# Patient Record
Sex: FEMALE | Race: WHITE | Employment: FULL TIME | URBAN - METROPOLITAN AREA
[De-identification: names, ages, dates, MRNs, and addresses within clinical notes are randomized per-mention and may not be internally consistent; named-entity substitution may affect disease eponyms.]

---

## 2018-07-06 ENCOUNTER — HOSPITAL ENCOUNTER (OUTPATIENT)
Dept: CT IMAGING | Age: 58
Discharge: HOME | End: 2018-07-06
Attending: INTERNAL MEDICINE
Payer: COMMERCIAL

## 2018-07-06 DIAGNOSIS — K62.89 MASS IN RECTUM: ICD-10-CM

## 2018-07-06 PROCEDURE — 74011000255 HC RX REV CODE- 255: Performed by: INTERNAL MEDICINE

## 2018-07-06 PROCEDURE — 74177 CT ABD & PELVIS W/CONTRAST: CPT

## 2018-07-06 PROCEDURE — 71260 CT THORAX DX C+: CPT

## 2018-07-06 PROCEDURE — 74011636320 HC RX REV CODE- 636/320: Performed by: INTERNAL MEDICINE

## 2018-07-06 PROCEDURE — 74011250636 HC RX REV CODE- 250/636: Performed by: INTERNAL MEDICINE

## 2018-07-06 RX ORDER — BARIUM SULFATE 20 MG/ML
900 SUSPENSION ORAL
Status: COMPLETED | OUTPATIENT
Start: 2018-07-06 | End: 2018-07-06

## 2018-07-06 RX ORDER — SODIUM CHLORIDE 9 MG/ML
50 INJECTION, SOLUTION INTRAVENOUS
Status: COMPLETED | OUTPATIENT
Start: 2018-07-06 | End: 2018-07-06

## 2018-07-06 RX ORDER — SODIUM CHLORIDE 0.9 % (FLUSH) 0.9 %
10 SYRINGE (ML) INJECTION
Status: COMPLETED | OUTPATIENT
Start: 2018-07-06 | End: 2018-07-06

## 2018-07-06 RX ADMIN — Medication 10 ML: at 16:10

## 2018-07-06 RX ADMIN — IOPAMIDOL 100 ML: 755 INJECTION, SOLUTION INTRAVENOUS at 16:08

## 2018-07-06 RX ADMIN — BARIUM SULFATE 900 ML: 21 SUSPENSION ORAL at 16:07

## 2018-07-06 RX ADMIN — SODIUM CHLORIDE 50 ML/HR: 900 INJECTION, SOLUTION INTRAVENOUS at 16:10

## 2018-07-11 ENCOUNTER — HOSPITAL ENCOUNTER (OUTPATIENT)
Age: 58
Setting detail: OUTPATIENT SURGERY
Discharge: HOME | End: 2018-07-11
Attending: INTERNAL MEDICINE | Admitting: INTERNAL MEDICINE
Payer: COMMERCIAL

## 2018-07-11 ENCOUNTER — ANESTHESIA EVENT (OUTPATIENT)
Dept: ENDOSCOPY | Age: 58
End: 2018-07-11
Payer: COMMERCIAL

## 2018-07-11 ENCOUNTER — ANESTHESIA (OUTPATIENT)
Dept: ENDOSCOPY | Age: 58
End: 2018-07-11
Payer: COMMERCIAL

## 2018-07-11 VITALS
TEMPERATURE: 98.4 F | SYSTOLIC BLOOD PRESSURE: 115 MMHG | OXYGEN SATURATION: 99 % | BODY MASS INDEX: 32.49 KG/M2 | RESPIRATION RATE: 16 BRPM | HEART RATE: 84 BPM | DIASTOLIC BLOOD PRESSURE: 81 MMHG | WEIGHT: 207 LBS | HEIGHT: 67 IN

## 2018-07-11 PROCEDURE — 74011000250 HC RX REV CODE- 250

## 2018-07-11 PROCEDURE — 74011250636 HC RX REV CODE- 250/636

## 2018-07-11 PROCEDURE — 77030019957 HC CUF BLN GASTSCP OCOA -B: Performed by: INTERNAL MEDICINE

## 2018-07-11 PROCEDURE — 76040000019: Performed by: INTERNAL MEDICINE

## 2018-07-11 PROCEDURE — 76060000031 HC ANESTHESIA FIRST 0.5 HR: Performed by: INTERNAL MEDICINE

## 2018-07-11 RX ORDER — EPINEPHRINE 0.1 MG/ML
1 INJECTION INTRACARDIAC; INTRAVENOUS
Status: DISCONTINUED | OUTPATIENT
Start: 2018-07-11 | End: 2018-07-11 | Stop reason: HOSPADM

## 2018-07-11 RX ORDER — LIDOCAINE HYDROCHLORIDE 20 MG/ML
INJECTION, SOLUTION EPIDURAL; INFILTRATION; INTRACAUDAL; PERINEURAL AS NEEDED
Status: DISCONTINUED | OUTPATIENT
Start: 2018-07-11 | End: 2018-07-11 | Stop reason: HOSPADM

## 2018-07-11 RX ORDER — SODIUM CHLORIDE 9 MG/ML
INJECTION, SOLUTION INTRAVENOUS
Status: DISCONTINUED | OUTPATIENT
Start: 2018-07-11 | End: 2018-07-11 | Stop reason: HOSPADM

## 2018-07-11 RX ORDER — NALOXONE HYDROCHLORIDE 0.4 MG/ML
0.4 INJECTION, SOLUTION INTRAMUSCULAR; INTRAVENOUS; SUBCUTANEOUS
Status: DISCONTINUED | OUTPATIENT
Start: 2018-07-11 | End: 2018-07-11 | Stop reason: HOSPADM

## 2018-07-11 RX ORDER — ATROPINE SULFATE 0.1 MG/ML
0.5 INJECTION INTRAVENOUS
Status: DISCONTINUED | OUTPATIENT
Start: 2018-07-11 | End: 2018-07-11 | Stop reason: HOSPADM

## 2018-07-11 RX ORDER — SODIUM CHLORIDE 0.9 % (FLUSH) 0.9 %
5-10 SYRINGE (ML) INJECTION EVERY 8 HOURS
Status: DISCONTINUED | OUTPATIENT
Start: 2018-07-11 | End: 2018-07-11 | Stop reason: HOSPADM

## 2018-07-11 RX ORDER — FLUMAZENIL 0.1 MG/ML
0.2 INJECTION INTRAVENOUS
Status: DISCONTINUED | OUTPATIENT
Start: 2018-07-11 | End: 2018-07-11 | Stop reason: HOSPADM

## 2018-07-11 RX ORDER — SODIUM CHLORIDE 0.9 % (FLUSH) 0.9 %
5-10 SYRINGE (ML) INJECTION AS NEEDED
Status: DISCONTINUED | OUTPATIENT
Start: 2018-07-11 | End: 2018-07-11 | Stop reason: HOSPADM

## 2018-07-11 RX ORDER — DEXTROMETHORPHAN/PSEUDOEPHED 2.5-7.5/.8
1.2 DROPS ORAL
Status: DISCONTINUED | OUTPATIENT
Start: 2018-07-11 | End: 2018-07-11 | Stop reason: HOSPADM

## 2018-07-11 RX ORDER — MIDAZOLAM HYDROCHLORIDE 1 MG/ML
.25-1 INJECTION, SOLUTION INTRAMUSCULAR; INTRAVENOUS
Status: DISCONTINUED | OUTPATIENT
Start: 2018-07-11 | End: 2018-07-11 | Stop reason: HOSPADM

## 2018-07-11 RX ORDER — PROPOFOL 10 MG/ML
INJECTION, EMULSION INTRAVENOUS AS NEEDED
Status: DISCONTINUED | OUTPATIENT
Start: 2018-07-11 | End: 2018-07-11 | Stop reason: HOSPADM

## 2018-07-11 RX ORDER — SODIUM CHLORIDE 9 MG/ML
50 INJECTION, SOLUTION INTRAVENOUS CONTINUOUS
Status: DISCONTINUED | OUTPATIENT
Start: 2018-07-11 | End: 2018-07-11 | Stop reason: HOSPADM

## 2018-07-11 RX ORDER — FENTANYL CITRATE 50 UG/ML
100 INJECTION, SOLUTION INTRAMUSCULAR; INTRAVENOUS
Status: DISCONTINUED | OUTPATIENT
Start: 2018-07-11 | End: 2018-07-11 | Stop reason: HOSPADM

## 2018-07-11 RX ORDER — LORATADINE 10 MG/1
10 TABLET ORAL
COMMUNITY
End: 2019-09-03 | Stop reason: ALTCHOICE

## 2018-07-11 RX ORDER — METHIMAZOLE 5 MG/1
2.5 TABLET ORAL
COMMUNITY

## 2018-07-11 RX ADMIN — PROPOFOL 50 MG: 10 INJECTION, EMULSION INTRAVENOUS at 14:20

## 2018-07-11 RX ADMIN — PROPOFOL 50 MG: 10 INJECTION, EMULSION INTRAVENOUS at 14:14

## 2018-07-11 RX ADMIN — PROPOFOL 50 MG: 10 INJECTION, EMULSION INTRAVENOUS at 14:11

## 2018-07-11 RX ADMIN — SODIUM CHLORIDE: 9 INJECTION, SOLUTION INTRAVENOUS at 14:02

## 2018-07-11 RX ADMIN — PROPOFOL 50 MG: 10 INJECTION, EMULSION INTRAVENOUS at 14:17

## 2018-07-11 RX ADMIN — LIDOCAINE HYDROCHLORIDE 40 MG: 20 INJECTION, SOLUTION EPIDURAL; INFILTRATION; INTRACAUDAL; PERINEURAL at 14:11

## 2018-07-11 NOTE — PERIOP NOTES

## 2018-07-11 NOTE — ANESTHESIA PREPROCEDURE EVALUATION
Anesthetic History   No history of anesthetic complications            Review of Systems / Medical History  Patient summary reviewed, nursing notes reviewed and pertinent labs reviewed    Pulmonary  Within defined limits                 Neuro/Psych   Within defined limits           Cardiovascular                  Exercise tolerance: >4 METS     GI/Hepatic/Renal               Comments: Rectal cancer Endo/Other      Hypothyroidism       Other Findings              Physical Exam    Airway  Mallampati: I  TM Distance: > 6 cm  Neck ROM: normal range of motion   Mouth opening: Normal     Cardiovascular    Rhythm: regular  Rate: normal         Dental  No notable dental hx       Pulmonary  Breath sounds clear to auscultation               Abdominal         Other Findings            Anesthetic Plan    ASA: 2  Anesthesia type: MAC          Induction: Intravenous  Anesthetic plan and risks discussed with: Patient

## 2018-07-11 NOTE — IP AVS SNAPSHOT
Summary of Care Report The Summary of Care report has been created to help improve care coordination. Users with access to [employer] or [address] (Web-based application) may access additional patient information including the Discharge Summary. If you are not currently a [address] user and need more information, please call the number listed below in the [place] [address] section and ask to be connected with Medical Records. Facility Information Name Address Phone [address] [facility] 7 [phone] Patient Information Patient Name Sex JAGDEEP Carrillo ([id number]) Female 1960 Discharge Information Admitting Provider Service Area Unit Gaby Mg MD / [address] [place] [address] [phone] Discharge Provider Discharge Date/Time Discharge Disposition Destination (none) (none) (none) (none) Patient Language Language ENGLISH [13] Non-Hospital Problems as of 2018  Never Reviewed Class Noted - Resolved Last Modified Active Problems Mixed hyperlipidemia  3/24/2011 - Present 3/24/2011 by Gwendolyn Villela Entered by Gwendolyn Villela You are allergic to the following No active allergies Current Discharge Medication List  
  
CONTINUE these medications which have NOT CHANGED Dose & Instructions Dispensing Information Comments CLARITIN 10 mg tablet Generic drug:  loratadine Dose:  10 mg Take 10 mg by mouth. Refills:  0  
   
 methIMAzole 5 mg tablet Commonly known as:  TAPAZOLE Dose:  5 mg Take 5 mg by mouth. Refills:  0 Surgery Information ID Date/Time Status Primary Surgeon All Procedures Location  [id number] 2018 1330 Unposted Gaby Mg MD ENDOSCOPIC ULTRASOUND (EUS)/Rectal   [facility] [place] ENDOSCOPY Follow-up Information None Discharge Instructions [address]. 
217 [facility] [address] [place], [address] 
[phone] Yadi Rea 
[id number] 
1960 RECTAL EUS DISCHARGE INSTRUCTIONS DISCOMFORT: 
Redness at IV site- apply warm compress to area; if redness or soreness persist- contact your physician There may be a slight amount of blood passed from the rectum Gaseous discomfort- walking, belching will help relieve any discomfort You may not operate a vehicle for 12 hours You may not  engage in an occupation involving machinery or appliances for rest of today You may not  drink alcoholic beverages for at least 12 hours Avoid making any critical decisions for at least 24 hour DIET: 
 Regular diet.  however -  remember your colon is empty and a heavy meal will produce gas. Avoid these foods:  vegetables, fried / greasy foods, carbonated drinks for today ACTIVITY: It is recommended that you spend the remainder of the day resting -  avoid any strenuous activity. CALL M.D. ANY SIGN OF: Increasing pain, nausea, vomiting Abdominal distension (swelling) New increased bleeding (oral or rectal) Fever (chills) Pain in chest area Shortness of breath Post procedure diagnosis:  T2, N1 Rectal Mass Follow-up Instructions: 
 
Call Dr. Octavio Oakley for any questions or problems. I Telephone # [phone] Chart Review Routing History Recipient Method Report Sent By Gwyn Guthrie Ulysses Forester, MD  
Fax: [phone] Phone: [phone] Fax Pepe Sarabia MD [[id number]] 7/6/2018  6:30 PM 7/6/2018

## 2018-07-11 NOTE — H&P
[address]  [address], [address] [place] [address]                                History and Physical     NAME: Florin Arriaga   :  1960   MRN:  [id number]     HPI:  The patient was seen and examined. Past Surgical History:   Procedure Laterality Date    HX GI      cholecystectomy    HX HEENT      sinus sgy    HX ORTHOPAEDIC      carpel tunnel     Past Medical History:   Diagnosis Date    Thyroid disease     hyperthyroidism     Social History   Substance Use Topics    Smoking status: Never Smoker    Smokeless tobacco: Never Used    Alcohol use No     No Known Allergies  Family History   Problem Relation Age of Onset    Heart Disease Mother     Heart Disease Father      Current Facility-Administered Medications   Medication Dose Route Frequency    0.9% sodium chloride infusion  50 mL/hr IntraVENous CONTINUOUS    sodium chloride (NS) flush 5-10 mL  5-10 mL IntraVENous Q8H    sodium chloride (NS) flush 5-10 mL  5-10 mL IntraVENous PRN    midazolam (VERSED) injection 0.25-10 mg  0.25-10 mg IntraVENous Multiple    fentaNYL citrate (PF) injection 100 mcg  100 mcg IntraVENous MULTIPLE DOSE GIVEN    naloxone (NARCAN) injection 0.4 mg  0.4 mg IntraVENous Multiple    flumazenil (ROMAZICON) 0.1 mg/mL injection 0.2 mg  0.2 mg IntraVENous Multiple    simethicone (MYLICON) 53VH/5.1RA oral drops 80 mg  1.2 mL Oral Multiple    atropine injection 0.5 mg  0.5 mg IntraVENous ONCE PRN    EPINEPHrine (ADRENALIN) 0.1 mg/mL syringe 1 mg  1 mg Endoscopically ONCE PRN         PHYSICAL EXAM:  General: WD, WN. Alert, cooperative, no acute distress    HEENT: NC, Atraumatic. PERRLA, EOMI. Anicteric sclerae. Lungs:  CTA Bilaterally. No Wheezing/Rhonchi/Rales. Heart:  Regular  rhythm,  No murmur, No Rubs, No Gallops  Abdomen: Soft, Non distended, Non tender.  +Bowel sounds, no HSM  Extremities: No c/c/e  Neurologic:  CN 2-12 gi, Alert and oriented X 3.   No acute neurological distress   Psych:   Good insight. Not anxious nor agitated. The heart, lungs and mental status were satisfactory for the administration of MAC sedation and for the procedure.       Mallampati score: 2       Assessment:   · Rectal Cancer    Plan:   · Endoscopic procedure  · MAC sedation   ·

## 2018-07-11 NOTE — ROUTINE PROCESS
Zoe Traore  1960  [id number]    Situation:  Verbal report received from: Michelle Chung RN  Procedure: Procedure(s):  ENDOSCOPIC ULTRASOUND (EUS)/Rectal      Background:    Preoperative diagnosis: Rectal Cancer  Postoperative diagnosis: T3, N1  Rectal Mass    :  Dr. Aleisha Barros  Assistant(s): Endoscopy Technician-1: Derrick Graff  Endoscopy RN-1: Jada Avila RN    Specimens: * No specimens in log *  H. Pylori  no    Assessment:  Intra-procedure medications   Anesthesia gave intra-procedure sedation and medications, see anesthesia flow sheet yes    Intravenous fluids: NS@ KVO     Vital signs stable     Abdominal assessment: round and soft     Recommendation:  Discharge patient per MD order.     Family or Friend   Permission to share finding with family or friend yes

## 2018-07-11 NOTE — DISCHARGE INSTRUCTIONS
118 [facility]3].  217 [facility] [address], [address] [facility]                                  Dimitris Larsen  [id number]  1960    RECTAL EUS DISCHARGE INSTRUCTIONS    DISCOMFORT:  Redness at IV site- apply warm compress to area; if redness or soreness persist- contact your physician  There may be a slight amount of blood passed from the rectum  Gaseous discomfort- walking, belching will help relieve any discomfort  You may not operate a vehicle for 12 hours  You may not  engage in an occupation involving machinery or appliances for rest of today  You may not  drink alcoholic beverages for at least 12 hours  Avoid making any critical decisions for at least 24 hour    DIET:   Regular diet. - however -  remember your colon is empty and a heavy meal will produce gas. Avoid these foods:  vegetables, fried / greasy foods, carbonated drinks for today     ACTIVITY:  It is recommended that you spend the remainder of the day resting -  avoid any strenuous activity. CALL M.D. ANY SIGN OF:   Increasing pain, nausea, vomiting  Abdominal distension (swelling)  New increased bleeding (oral or rectal)  Fever (chills)  Pain in chest area    Shortness of breath      Post procedure diagnosis:  T2, N1  Rectal Mass    Follow-up Instructions:    Call Dr. Marlon Sherwood for any questions or problems.      I Telephone # [phone]

## 2018-07-11 NOTE — ANESTHESIA POSTPROCEDURE EVALUATION
Post-Anesthesia Evaluation and Assessment    Patient: Yadi Rogers MRN: [id number]  SSN: [id number]    YOB: 1960  Age: 63 y.o. Sex: female       Cardiovascular Function/Vital Signs  Visit Vitals    /81    Pulse 84    Temp 36.9 °C (98.4 °F)    Resp 16    Ht 5' 7\" (1.702 m)    Wt 93.9 kg (207 lb)    SpO2 99%    BMI 32.42 kg/m2       Patient is status post MAC anesthesia for Procedure(s):  ENDOSCOPIC ULTRASOUND (EUS)/Rectal  .    Nausea/Vomiting: None    Postoperative hydration reviewed and adequate. Pain:  Pain Scale 1: Numeric (0 - 10) (07/11/18 1442)  Pain Intensity 1: 0 (07/11/18 1442)   Managed    Neurological Status: At baseline    Mental Status and Level of Consciousness: Arousable    Pulmonary Status:   O2 Device: Room air (07/11/18 1442)   Adequate oxygenation and airway patent    Complications related to anesthesia: None    Post-anesthesia assessment completed.  No concerns    Signed By: Yolie Dominguez MD     July 11, 2018

## 2018-07-11 NOTE — H&P
118 [facility].  [address] [place] [address] [address], [address] [facility] [address]  [phone]                                 Flexible sigmoidoscopy with rectal endoscopic ultrasound     NAME:  Renuka Buchanan   :   1960   MRN:   [id number]       Date/Time:  2018     Procedure Name: Flexible sigmoidoscopy with rectal endoscopic ultrasound     Indications: Rectal Mass    : Claudia Paez MD    Referring Provider: Rigo Chávez MD    Anethesia/Sedation:  MAC anesthesia      Procedure Details   After infom consent was obtained for the procedure, with all risks and benefits of procedure explained the patient was taken to the endoscopy suite and placed in the left lateral decubitus position. Initially passed the rectal radial echoendoscope to 30 cm. The endoscope was withdrawn, and then the standard endoscope was passed. The patient tolerated the procedure well. Findings:     Endoscopic findings: A partially obstructing mass was seen at 4 cm from anal verge till 11 cm. Mass was friable and involved about 3/4th of the circumference. EUS findings:  A partially circumferential mass was seen in the distal and mid rectum. Mass was seen extending through the muscularis propria. No ilfiltration of the adjacent organs was noted. The iliac vessels were seen and were normal. Two small lymph nodes about 6 mm in size appreciated adjacent to the mass. No perirectal mass was seen. Specimen Removed:  None    Complications: None. EBL:  None.     Impression: T2N1    Recommendations:   -Refer to Jose Luis Castillo and [facility] appointment with Dr Miri Alcantar current medications    Claudia Paez MD  2018  2:25 PM

## 2018-07-11 NOTE — IP AVS SNAPSHOT
2700 [facility] [address] [place] [address] 
[phone] Patient: Slime Abel MRN: [id number]:[id number] About your hospitalization You were admitted on:  July 11, 2018 You last received care in the:  [facility] ENDOSCOPY You were discharged on:  July 11, 2018 Why you were hospitalized Your primary diagnosis was:  Not on File Follow-up Information None Your Scheduled Appointments Tuesday July 17, 2018  2:00 PM EDT New Patient with Tariq Thompson MD  
[address] [place] [address] [facility] [address] [facility] 209 [address] [place] [address]  
[phone] Discharge Orders None A check isidoro indicates which time of day the medication should be taken. My Medications CONTINUE taking these medications Instructions Each Dose to Equal  
 Morning Noon Evening Bedtime CLARITIN 10 mg tablet Generic drug:  loratadine Your last dose was: Your next dose is: Take 10 mg by mouth. 10 mg  
    
   
   
   
  
 methIMAzole 5 mg tablet Commonly known as:  TAPAZOLE Your last dose was: Your next dose is: Take 5 mg by mouth. 5 mg Discharge Instructions [facility]. 
[address] [facility] [address] [place], [address] 
[phone] Slime Abel 
[id number] 
1960 RECTAL EUS DISCHARGE INSTRUCTIONS DISCOMFORT: 
Redness at IV site- apply warm compress to area; if redness or soreness persist- contact your physician There may be a slight amount of blood passed from the rectum Gaseous discomfort- walking, belching will help relieve any discomfort You may not operate a vehicle for 12 hours You may not  engage in an occupation involving machinery or appliances for rest of today You may not  drink alcoholic beverages for at least 12 hours Avoid making any critical decisions for at least 24 hour DIET: 
 Regular diet.  however -  remember your colon is empty and a heavy meal will produce gas. Avoid these foods:  vegetables, fried / greasy foods, carbonated drinks for today ACTIVITY: It is recommended that you spend the remainder of the day resting -  avoid any strenuous activity. CALL M.D. ANY SIGN OF: Increasing pain, nausea, vomiting Abdominal distension (swelling) New increased bleeding (oral or rectal) Fever (chills) Pain in chest area Shortness of breath Post procedure diagnosis:  T2, N1 Rectal Mass Follow-up Instructions: 
 
Call Dr. Jamila Marsh for any questions or problems. I Telephone # [phone] Introducing [facility]! Leslee Pandya introduces [employer] patient portal. Now you can access parts of your medical record, email your doctor's office, and request medication refills online. 1. In your internet browser, go to [email or web address] 2. Click on the First Time User? Click Here link in the Sign In box. You will see the New Member Sign Up page. 3. Enter your [employer] Access Code exactly as it appears below. You will not need to use this code after youve completed the sign-up process. If you do not sign up before the expiration date, you must request a new code. · [employer] Access Code: [email or web address] Expires: 10/3/2018 10:47 AM 
 
4. Enter the last four digits of your Social Security Number (xxxx) and Date of Birth (mm/dd/yyyy) as indicated and click Submit. You will be taken to the next sign-up page. 5. Create a ClickGanict ID. This will be your [employer] login ID and cannot be changed, so think of one that is secure and easy to remember. 6. Create a [employer] password. You can change your password at any time. 7. Enter your Password Reset Question and Answer. This can be used at a later time if you forget your password. 8. Enter your e-mail address. You will receive e-mail notification when new information is available in [address] 9. Click Sign Up. You can now view and download portions of your medical record. 10. Click the Download Summary menu link to download a portable copy of your medical information. If you have questions, please visit the Frequently Asked Questions section of the [employer] website. Remember, [employer] is NOT to be used for urgent needs. For medical emergencies, dial 911. Now available from your iPhone and Android! Introducing Shade Ramirez As a [employer] patient, I wanted to make you aware of our electronic visit tool called Shade Ramirez. [employer] 24/7 allows you to connect within minutes with a medical provider 24 hours a day, seven days a week via a mobile device or tablet or logging into a secure website from your computer. You can access Shade Ramirez from anywhere in the [place]. A virtual visit might be right for you when you have a simple condition and feel like you just dont want to get out of bed, or cant get away from work for an appointment, when your regular [employer] provider is not available (evenings, weekends or holidays), or when youre out of town and need minor care. Electronic visits cost only $49 and if the [employer] 24/7 provider determines a prescription is needed to treat your condition, one can be electronically transmitted to a nearby pharmacy*. Please take a moment to enroll today if you have not already done so. The enrollment process is free and takes just a few minutes. To enroll, please download the [employer] 24/7 hebert to your tablet or phone, or visit [email or web address] to enroll on your computer.    
And, as an [address] [place] [address] patient with a [employer] account, the results of your visits will be scanned into your electronic medical record and your primary care provider will be able to view the scanned results. We urge you to continue to see your regular [address] [place] [address] provider for your ongoing medical care. And while your primary care provider may not be the one available when you seek a Shade Estradasylviafin virtual visit, the peace of mind you get from getting a real diagnosis real time can be priceless. For more information on [employer], view our Frequently Asked Questions (FAQs) at [email or web address]. Sincerely, 
 
Juan Bernard MD 
Chief Medical Officer [facility] Chelly Florez *:  certain medications cannot be prescribed via Shade Seanxochilt Providers Seen During Your Hospitalization Provider Specialty Primary office phone Yamila Kaufman MD Gastroenterology [phone] Your Primary Care Physician (PCP) Primary Care Physician Office Phone Office Fax UNKNOWN, PROVIDER ** None ** ** None ** You are allergic to the following No active allergies Recent Documentation Height Weight BMI OB Status Smoking Status 1.702 m 93.9 kg 32.42 kg/m2 Postmenopausal Never Smoker Emergency Contacts Name Discharge Info Relation Home Work Mobile [employer] DISCHARGE CAREGIVER [3] Spouse [3] [phone] Patient Belongings The following personal items are in your possession at time of discharge: 
  Dental Appliances: None  Visual Aid: Glasses Please provide this summary of care documentation to your next provider. Signatures-by signing, you are acknowledging that this After Visit Summary has been reviewed with you and you have received a copy. Patient Signature:  ____________________________________________________________ Date:  ____________________________________________________________  
  
Annabel Hay Provider Signature:  ____________________________________________________________ Date:  ____________________________________________________________

## 2018-07-13 NOTE — PROCEDURES
118 [facility].  [address] [place] [address] [address], [place] [address]  [phone]                                 Flexible sigmoidoscopy with rectal endoscopic ultrasound      NAME:                      Reza Griffiths   :                                   1960   MRN:                                   [id number]         Date/Time:                2018      Procedure Name: Flexible sigmoidoscopy with rectal endoscopic ultrasound      Indications: Rectal Mass     : Rexann Lennox, MD     Referring Provider:             Mata Ya MD     Anethesia/Sedation:  MAC anesthesia      Procedure Details   After infom consent was obtained for the procedure, with all risks and benefits of procedure explained the patient was taken to the endoscopy suite and placed in the left lateral decubitus position. Initially passed the rectal radial echoendoscope to 30 cm. The endoscope was withdrawn, and then the standard endoscope was passed. The patient tolerated the procedure well.                     Findings:      Endoscopic findings: A partially obstructing mass was seen at 4 cm from anal verge till 11 cm. Mass was friable and involved about 3/4th of the circumference.      EUS findings:  A partially circumferential mass was seen in the distal and mid rectum. Mass was seen extending through the muscularis propria. No ilfiltration of the adjacent organs was noted. The iliac vessels were seen and were normal. Two small lymph nodes about 6 mm in size appreciated adjacent to the mass.   No perirectal mass was seen.         Specimen Removed:  None     Complications: None.      EBL:  None.     Impression: T2N1     Recommendations:   -Refer to Jose Luis Rooney and [facility] appointment with Dr Luz Mistry current medications     Rexann Lennox, MD  2018  2:25 PM

## 2018-07-17 ENCOUNTER — DOCUMENTATION ONLY (OUTPATIENT)
Dept: ONCOLOGY | Age: 58
End: 2018-07-17

## 2018-07-17 ENCOUNTER — NURSE NAVIGATOR (OUTPATIENT)
Dept: ONCOLOGY | Age: 58
End: 2018-07-17

## 2018-07-17 ENCOUNTER — OFFICE VISIT (OUTPATIENT)
Dept: ONCOLOGY | Age: 58
End: 2018-07-17

## 2018-07-17 ENCOUNTER — TELEPHONE (OUTPATIENT)
Dept: ONCOLOGY | Age: 58
End: 2018-07-17

## 2018-07-17 VITALS
RESPIRATION RATE: 20 BRPM | BODY MASS INDEX: 33.09 KG/M2 | WEIGHT: 210.8 LBS | SYSTOLIC BLOOD PRESSURE: 123 MMHG | DIASTOLIC BLOOD PRESSURE: 83 MMHG | OXYGEN SATURATION: 96 % | HEART RATE: 80 BPM | HEIGHT: 67 IN | TEMPERATURE: 98.1 F

## 2018-07-17 DIAGNOSIS — C20 RECTAL CANCER METASTASIZED TO INTRAPELVIC LYMPH NODE (HCC): Primary | ICD-10-CM

## 2018-07-17 DIAGNOSIS — C77.5 RECTAL CANCER METASTASIZED TO INTRAPELVIC LYMPH NODE (HCC): ICD-10-CM

## 2018-07-17 DIAGNOSIS — C77.5 RECTAL CANCER METASTASIZED TO INTRAPELVIC LYMPH NODE (HCC): Primary | ICD-10-CM

## 2018-07-17 DIAGNOSIS — E66.9 CLASS 1 OBESITY WITH SERIOUS COMORBIDITY AND BODY MASS INDEX (BMI) OF 33.0 TO 33.9 IN ADULT, UNSPECIFIED OBESITY TYPE: ICD-10-CM

## 2018-07-17 DIAGNOSIS — C20 RECTAL CANCER METASTASIZED TO INTRAPELVIC LYMPH NODE (HCC): ICD-10-CM

## 2018-07-17 RX ORDER — CAPECITABINE 500 MG/1
1500 TABLET, FILM COATED ORAL 2 TIMES DAILY
Qty: 150 TAB | Refills: 0 | Status: SHIPPED | OUTPATIENT
Start: 2018-07-17 | End: 2018-08-11

## 2018-07-17 RX ORDER — GLUCOSAMINE HCL 500 MG
TABLET ORAL
COMMUNITY
End: 2018-12-03

## 2018-07-17 NOTE — MR AVS SNAPSHOT
[age] y [address] [facility] 209 1400 [address] [place] [address] 
[phone] Patient: Rajiv Felix MRN: [id number] ASHLYN:[id number] Visit Information Date & Time Provider Department Dept. Phone Encounter #  
 7/17/2018  2:00 PM MD Gabby Weber [facility] at [address] [id number] Follow-up Instructions Return in about 3 weeks (around 8/7/2018). Routing History Your Appointments 8/22/2018  3:30 PM  
Follow Up with Frederic Scott MD  
130 [facility] Appt Note: follow up [address] [facility] 209 [facility] 7 [id number]  
[phone]  
  
   
 [address] [address]PA [facility] [address] Upcoming Health Maintenance Date Due Hepatitis C Screening 1960 DTaP/Tdap/Td series (1 - Tdap) 4/20/1982 PAP AKA CERVICAL CYTOLOGY 4/20/1982 BREAST CANCER SCRN MAMMOGRAM 4/20/2011 FOBT Q 1 YEAR AGE 50-75 4/20/2011 Influenza Age 5 to Adult 8/1/2018 Allergies as of 7/17/2018  Review Complete On: 7/17/2018 By: Frederic Scott MD  
 No Known Allergies Current Immunizations  Never Reviewed No immunizations on file. Not reviewed this visit You Were Diagnosed With   
  
 Codes Comments Rectal cancer metastasized to intrapelvic lymph node ([facility] [address]    -  Primary ICD-10-CM: C20, C77.5 ICD-9-CM: 154.1, 196.6 Class 1 obesity with serious comorbidity and body mass index (BMI) of 33.0 to 33.9 in adult, unspecified obesity type     ICD-10-CM: E66.9, Z68.33 
ICD-9-CM: 278.00, V85.33 Vitals BP Pulse Temp Resp Height(growth percentile) Weight(growth percentile) 123/83 80 98.1 °F (36.7 °C) 20 5' 7\" (1.702 m) 210 lb 12.8 oz (95.6 kg) SpO2 BMI OB Status Smoking Status 96% 33.02 kg/m2 Postmenopausal Never Smoker Vitals History BMI and BSA Data Body Mass Index Body Surface Area 33.02 kg/m 2 2.13 m 2 Preferred Pharmacy Pharmacy Name Phone [facility] [phone] [place], [address] [facility] [address] [phone] Your Updated Medication List  
  
   
This list is accurate as of 7/17/18  4:00 PM.  Always use your most recent med list.  
  
  
  
  
 Cholecalciferol (Vitamin D3) 3,000 unit Tab Take  by mouth. CLARITIN 10 mg tablet Generic drug:  loratadine Take 10 mg by mouth. methIMAzole 5 mg tablet Commonly known as:  TAPAZOLE Take 5 mg by mouth. OTHER [employer] Follow-up Instructions Return in about 3 weeks (around 8/7/2018). To-Do List   
 As directed Lab:  CBC WITH AUTOMATED DIFF As directed Lab:  FERRITIN As directed Lab:  METABOLIC PANEL, COMPREHENSIVE Patient Instructions Rectal Cancer: Care Instructions Your Care Instructions Rectal cancer occurs when abnormal cells grow out of control in your rectum. If the tumor was small and had not spread, your doctor may have removed it during the colonoscopy. But you may need surgery to remove the cancer if the tumor was too big or had spread too far to be removed during a colonoscopy. If cancer has spread to another part of your body, such as the liver, you may need more far-reaching surgery. Treatment for rectal cancer may also include radiation therapy and medicines that destroy cancer cells (chemotherapy). Being treated for cancer can weaken your body, and you may feel very tired. Get the rest your body needs so that you can feel better. When you find out that you have cancer, you may feel many emotions and may need some help coping. Seek out family, friends, and counselors for support. You also can do things at home to make yourself feel better while you go through treatment. Call the [employer] (8-215.840.3968) or visit its website at [address] [employer]. org for more information. Follow-up care is a key part of your treatment and safety. Be sure to make and go to all appointments, and call your doctor if you are having problems. It's also a good idea to know your test results and keep a list of the medicines you take. How can you care for yourself at home? · Take your medicines exactly as prescribed. Call your doctor if you think you are having a problem with your medicine. You may get medicine for nausea and vomiting if you have these side effects. · Follow your doctor's instructions to relieve pain. Pain from cancer and surgery can almost always be controlled. Use pain medicine when you first notice pain, before it becomes severe. · Eat healthy food. If you do not feel like eating, try to eat food that has protein and extra calories to keep up your strength and prevent weight loss. Drink liquid meal replacements for extra calories and protein. Try to eat your main meal early. · Get some physical activity every day, but do not get too tired. Keep doing the hobbies you enjoy as your energy allows. · Take steps to control your stress and workload. Learn relaxation techniques. ¨ Share your feelings. Stress and tension affect our emotions. By expressing your feelings to others, you may be able to understand and cope with them. ¨ Consider joining a support group. Talking about a problem with your spouse, a good friend, or other people with similar problems is a good way to reduce tension and stress. ¨ Express yourself through art. Try writing, dance, art, or crafts to relieve stress. Some dance, writing, or art groups may be available just for people who have cancer. ¨ Be kind to your body and mind. Getting enough sleep, eating a healthy diet, and taking time to do things you enjoy can contribute to an overall feeling of balance in your life and help reduce stress. ¨ Get help if you need it. Discuss your concerns with your doctor or counselor. · If you are vomiting or have diarrhea: ¨ Drink plenty of fluids (enough so that your urine is light yellow or clear like water) to prevent dehydration. Choose water and other caffeine-free clear liquids. If you have kidney, heart, or liver disease and have to limit fluids, talk with your doctor before you increase the amount of fluids you drink. ¨ When you are able to eat, try clear soups, mild foods, and liquids until all symptoms are gone for 12 to 48 hours. Other good choices include dry toast, crackers, cooked cereal, and gelatin dessert, such as Jell-O. · If you have not already done so, prepare a list of advance directives. Advance directives are instructions to your doctor and family members about what kind of care you want if you become unable to speak or express yourself. When should you call for help? Call 911 anytime you think you may need emergency care. For example, call if: 
  · You passed out (lost consciousness).  
  · You pass maroon or very bloody stools.  
 Call your doctor now or seek immediate medical care if: 
  · You have a fever.  
  · You are vomiting.  
  · You have new or worse belly pain.  
  · You cannot pass stools or gas.  
  · You have new or more blood in your stools.  
 Watch closely for changes in your health, and be sure to contact your doctor if: 
  · You have new or worse symptoms.  
  · You are losing weight.  
  · You do not get better as expected. Where can you learn more? Go to http://bridget-jose.info/. Enter V164 in the search box to learn more about \"Rectal Cancer: Care Instructions. \" Current as of: May 12, 2017 Content Version: 11.7 © 2291-8099 Healthwise, Incorporated. Care instructions adapted under license by [employer] (which disclaims liability or warranty for this information).  If you have questions about a medical condition or this instruction, always ask your healthcare professional. [employer] disclaims any warranty or liability for your use of this information. Riddhi Zhou and Marni, It was very nice to meet you today. Among other things, we talked about safe, evidence-based sources on the internet to go to for information about your cancer. Please check out the website for the [address] -- at [address] [facility]. Call me with any questions, anytime! I'll look forward to seeing you and being of help to you. Contact me at [phone] or through your My Chart portal.   
 
SJ Andino MS RN AOCNS Oncology Nurse Navigator Introducing [facility]! Preet Aguero introduces [employer] patient portal. Now you can access parts of your medical record, email your doctor's office, and request medication refills online. 1. In your internet browser, go to [email or web address] 2. Click on the First Time User? Click Here link in the Sign In box. You will see the New Member Sign Up page. 3. Enter your Fe[employer]e Access Code exactly as it appears below. You will not need to use this code after youve completed the sign-up process. If you do not sign up before the expiration date, you must request a new code. · [employer] Access Code: [email or web address] Expires: 10/3/2018 10:47 AM 
 
4. Enter the last four digits of your Social Security Number (xxxx) and Date of Birth (mm/dd/yyyy) as indicated and click Submit. You will be taken to the next sign-up page. 5. Create a Elastic[employer] ID. This will be your [employer] login ID and cannot be changed, so think of one that is secure and easy to remember. 6. Create a [employer] password. You can change your password at any time. 7. Enter your Password Reset Question and Answer. This can be used at a later time if you forget your password. 8. Enter your e-mail address. You will receive e-mail notification when new information is available in [address] 9. Click Sign Up. You can now view and download portions of your medical record. 10. Click the Download Summary menu link to download a portable copy of your medical information. If you have questions, please visit the Frequently Asked Questions section of the [employer] website. Remember, [employer] is NOT to be used for urgent needs. For medical emergencies, dial 911. Now available from your iPhone and Android! Please provide this summary of care documentation to your next provider. Your primary care clinician is listed as PROVIDER UNKNOWN. If you have any questions after today's visit, please call [phone].

## 2018-07-17 NOTE — TELEPHONE ENCOUNTER
Call to [place] office to obtain recent labwork on patient. CBC was drawn, they will send. Call to patient. She will need baseline CMP and ferritin. She will advise tomorrow of fax number to send lab order.

## 2018-07-17 NOTE — PROGRESS NOTES
[facility] [place] [facility] [place] [address], [address] [place]: [phone]  F: [phone]    Reason for Visit:   Andrea Pandya is a 63 y.o. female who is seen in consultation at the request of Dr. Cherelle Corral for evaluation of Rectal cancer. Treatment History:   · 6/29/18-colonoscopy was notable for a bleeding mass of malignant appearance in the mid rectum and distal rectum at a distance between 4 cm to 11 cm from the anus. Caused partial obstruction. · 7/6/18-CT chest abdomen pelvis showed rectal thickening, some perirectal stranding and several subcentimeter perirectal lymph nodes  · 7/11/18: Rectal ultrasound showed a partially circumferential mass seen in the distal rectum extending through the muscularis propria, 2 small lymph nodes about 6 mm in size adjacent to the mass. History of Present Illness:   Patient is a 63 y.o. female who had not had a screening colonoscopy developed constipation and subsequently BRBPR x 5-6 months. She wondered if this was secondary to L thyroxine. However symptoms continued to progress and she was referred to GI for further evaluation. Further investigations as above. She has since met with Dr. Asad Diaz- Colorectal surgery and GI Dr. Cherelle Corral. Comes to discuss further management with Med Onc    She is now taking Miralax and now going every 3rd day- has soft stools but has occasional BRBPR. Has no pain with defecation. Other than that has no complaints in the form of fevers, chills, night sweats, chest pain, shortness of breath, headache, falls, abdominal pain, diarrhea or dysuria. She had one episode of postmenopausal bleeding. She has not had any recurrences since. She has 1 adopted daughter. Supportive  with her today who is a CNA    Quit smoking in 1992  Rare ETOH    No FH of cancer.        Past Medical History:   Diagnosis Date    Thyroid disease     hyperthyroidism      Past Surgical History:   Procedure Laterality Date    HX GI      cholecystectomy    HX HEENT      sinus sgy    HX ORTHOPAEDIC      carpel tunnel      Social History   Substance Use Topics    Smoking status: Never Smoker    Smokeless tobacco: Never Used    Alcohol use No      Family History   Problem Relation Age of Onset    Heart Disease Mother     Heart Disease Father      Current Outpatient Prescriptions   Medication Sig    OTHER [employer] Cholecalciferol, Vitamin D3, 3,000 unit tab Take  by mouth.  methIMAzole (TAPAZOLE) 5 mg tablet Take 5 mg by mouth.  loratadine (CLARITIN) 10 mg tablet Take 10 mg by mouth. No current facility-administered medications for this visit. No Known Allergies     Review of Systems: A complete review of systems was obtained, negative except as described above. Physical Exam:     Visit Vitals    /83    Pulse 80    Temp 98.1 °F (36.7 °C)    Resp 20    Ht 5' 7\" (1.702 m)    Wt 210 lb 12.8 oz (95.6 kg)    SpO2 96%    BMI 33.02 kg/m2     ECOG PS: 0  General: No distress  Eyes: PERRLA, anicteric sclerae  HENT: Atraumatic, OP clear  Neck: Supple  Lymphatic: No cervical, supraclavicular, or inguinal adenopathy  Respiratory: CTAB, normal respiratory effort  CV: Normal rate, regular rhythm, no murmurs, no peripheral edema  GI: Soft, nontender, nondistended, no masses, no hepatomegaly, no splenomegaly  MS: Normal gait and station. Digits without clubbing or cyanosis. Skin: No rashes, ecchymoses, or petechiae. Normal temperature, turgor, and texture. Psych: Alert, oriented, appropriate affect, normal judgment/insight    Results:   No results found for: WBC, HGB, HCT, PLT, MCV, ANEU, HGBPOC, HCTPOC, HGBEXT, HCTEXT, PLTEXT  No results found for: NA, K, CL, CO2, GLU, BUN, CREA, GFRAA, GFRNA, CA, NAPOC, KPOCT, CLPOC, GLUCPOC, IBUN, CREAPOC, ICAI  No results found for: TBILI, ALT, SGOT, AP, TP, ALB, GLOB      Records reviewed and summarized above.   Pathology report(s) reviewed above. Radiology report(s) reviewed above. 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    All available pathology, imaging, lab results and progress notes were reviewed. Consistent with clinical stage III adenocarcinoma of the mid and distal third of rectum. We discussed management of rectal cancer with curative intent. Reviewed the rationale behind neoadjuvant chemotherapy with concurrent radiation- goals being decreasing the rate of local recurrence and helping with sphincter preservation    Reviewed the results of the [facility] Rectal cancer study that compared preop Chemo XRT followed by surgery to surgery alone    At 46 months median follow-up, preoperative chemoradiotherapy was associated with a significantly lower pelvic relapse rate (6 versus 13 percent with postoperative therapy). The five-year disease-free survival (DFS; 68 versus 65 percent) and overall survival rates (76 versus 74 percent) were similar for preoperative and postoperative therapy, respectively; 10-year rates were also comparable (DFS approximately 68 percent in both groups, overall survival approximately 60 percent in both groups). Discussed the regimen in detail ( 825 mg/m2 twice daily, five days per week)    We discussed the chemotherapy regimen, it's logistics, and potential toxicities in detail. Potential side effects include, but are not limited to, nausea, vomiting, diarrhea, taste changes, myelosuppression, infection, fatigue, allergic reactions, rash, edema, neuropathy, and rarely, death. The patient asked several well thought out questions which I answered to the best of my ability and to their apparent satisfaction. The patient has given consent for chemotherapy.     We also discussed post op adjuvant chemotherapy for 4 months     2) Hypothyroidism  Controlled    3) GIB  Secondary to 1  Will arrange for IV iron depending on CBC        Plan:     · Xeloda with Radiation- process orders  · Follow with   · Follow with Dr. Yue Mary 6 weeks after completion of XRT and radiation  · Dietician to reach out    NN present    See me every 2 weeks with cbc, cmp while on XRT and Xeloda    I appreciate the opportunity to participate in Ms. Tracee Galeano's care.     Signed By: Lenin Melendez MD

## 2018-07-17 NOTE — PROGRESS NOTES
Had the opportunity to meet with patient and  Heidi Granger to discuss xeloda with radiation. [employer] information provided and reviewed in depth on xeloda. Reviewed safety in administration and storage. Discussed the management of possible side effects including nausea and diarrhea. Sexual health and infection control precautions reviewed. Reviewed importance of baseline labs and ongoing. Reviewed the role of specialty pharmacy. All questions answered. Chemo consent obtained. She expressed concern over insurance deductible as they had just raised their deductibles on July 1. Navigator on site today. Encouraged to review material provided and to call with any questions/concerns.

## 2018-07-17 NOTE — PATIENT INSTRUCTIONS
Rectal Cancer: Care Instructions  Your Care Instructions    Rectal cancer occurs when abnormal cells grow out of control in your rectum. If the tumor was small and had not spread, your doctor may have removed it during the colonoscopy. But you may need surgery to remove the cancer if the tumor was too big or had spread too far to be removed during a colonoscopy. If cancer has spread to another part of your body, such as the liver, you may need more far-reaching surgery. Treatment for rectal cancer may also include radiation therapy and medicines that destroy cancer cells (chemotherapy). Being treated for cancer can weaken your body, and you may feel very tired. Get the rest your body needs so that you can feel better. When you find out that you have cancer, you may feel many emotions and may need some help coping. Seek out family, friends, and counselors for support. You also can do things at home to make yourself feel better while you go through treatment. Call the [employer] (3-118.384.4770) or visit its website at [address] for more information. Follow-up care is a key part of your treatment and safety. Be sure to make and go to all appointments, and call your doctor if you are having problems. It's also a good idea to know your test results and keep a list of the medicines you take. How can you care for yourself at home? · Take your medicines exactly as prescribed. Call your doctor if you think you are having a problem with your medicine. You may get medicine for nausea and vomiting if you have these side effects. · Follow your doctor's instructions to relieve pain. Pain from cancer and surgery can almost always be controlled. Use pain medicine when you first notice pain, before it becomes severe. · Eat healthy food. If you do not feel like eating, try to eat food that has protein and extra calories to keep up your strength and prevent weight loss.  Drink liquid meal replacements for extra calories and protein. Try to eat your main meal early. · Get some physical activity every day, but do not get too tired. Keep doing the hobbies you enjoy as your energy allows. · Take steps to control your stress and workload. Learn relaxation techniques. ¨ Share your feelings. Stress and tension affect our emotions. By expressing your feelings to others, you may be able to understand and cope with them. ¨ Consider joining a support group. Talking about a problem with your spouse, a good friend, or other people with similar problems is a good way to reduce tension and stress. ¨ Express yourself through art. Try writing, dance, art, or crafts to relieve stress. Some dance, writing, or art groups may be available just for people who have cancer. ¨ Be kind to your body and mind. Getting enough sleep, eating a healthy diet, and taking time to do things you enjoy can contribute to an overall feeling of balance in your life and help reduce stress. ¨ Get help if you need it. Discuss your concerns with your doctor or counselor. · If you are vomiting or have diarrhea:  ¨ Drink plenty of fluids (enough so that your urine is light yellow or clear like water) to prevent dehydration. Choose water and other caffeine-free clear liquids. If you have kidney, heart, or liver disease and have to limit fluids, talk with your doctor before you increase the amount of fluids you drink. ¨ When you are able to eat, try clear soups, mild foods, and liquids until all symptoms are gone for 12 to 48 hours. Other good choices include dry toast, crackers, cooked cereal, and gelatin dessert, such as Jell-O.  · If you have not already done so, prepare a list of advance directives. Advance directives are instructions to your doctor and family members about what kind of care you want if you become unable to speak or express yourself. When should you call for help? Call 911 anytime you think you may need emergency care.  For example, call if:    · You passed out (lost consciousness).     · You pass maroon or very bloody stools.    Call your doctor now or seek immediate medical care if:    · You have a fever.     · You are vomiting.     · You have new or worse belly pain.     · You cannot pass stools or gas.     · You have new or more blood in your stools.    Watch closely for changes in your health, and be sure to contact your doctor if:    · You have new or worse symptoms.     · You are losing weight.     · You do not get better as expected. Where can you learn more? Go to http://bridget-jose.info/. Enter M672 in the search box to learn more about \"Rectal Cancer: Care Instructions. \"  Current as of: May 12, 2017  Content Version: 11.7  © 2813-5795 [employer]. Care instructions adapted under license by [employer] (which disclaims liability or warranty for this information). If you have questions about a medical condition or this instruction, always ask your healthcare professional. [place] [address] any warranty or liability for your use of this information. Sana Rutherford and Ambrose Malloy,    It was very nice to meet you today. Among other things, we talked about safe, evidence-based sources on the internet to go to for information about your cancer. Please check out the website for the [address] -- at [address] Call me with any questions, anytime! I'll look forward to seeing you and being of help to you.   Contact me at [phone] or through your My Chart portal.      Elfego Andino MS RN [place] [address]  Oncology Nurse Navigator

## 2018-07-18 ENCOUNTER — TELEPHONE (OUTPATIENT)
Dept: ONCOLOGY | Age: 58
End: 2018-07-18

## 2018-07-18 DIAGNOSIS — C20 RECTAL CANCER METASTASIZED TO INTRAPELVIC LYMPH NODE (HCC): Primary | ICD-10-CM

## 2018-07-18 DIAGNOSIS — C77.5 RECTAL CANCER METASTASIZED TO INTRAPELVIC LYMPH NODE (HCC): Primary | ICD-10-CM

## 2018-07-18 NOTE — PROGRESS NOTES
ONCOLOGY NURSE NAVIGATOR    Appreciate opportunity to meet and support Eneida Brown and , Kalpesh Ramsey today -- she was seen with Dr. Sukh Trejo  Referred by Dr. 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 CT chest, abdomen and pelvis showed rectal thickening, some perirectal stranding and very small perirectal LNs. Rectal US showed a partially circumferential mass extending through muscularis propria and 2 6mm LNs adjacent to the lesion. She has been seen by Dr. Suman Yeung and has some idea about treatment that will be recommended. Her PMH is unremarkable. She is using miralax daily to achieve BMs every 2-3 days. Has an irritated hemorrhoid. Weight is stable. She denies pain. She is  to [place] and lives in [place]    They have a 16yo daughter, Lo Angelo. Lo Angelo is enrolled in private school in [address] [place] [address] and this summer is participating in the [address] [place] [address] [employer] and a Yarsanism related mission trip. Eneida Brown is employed as a  and works for a firm that has assured her of support and flexibility throughout her cancer treatment. She wants to work through therapy. Eneida Brown has looked for info about rectal cancer on line one time, was frightened and stopped doing research. She is upbeat, says she initially cried about her cancer and now is determined to get started with treatment. Lo Angelo is aware of mom's cancer but since she has been out of the house recently, she doesn't know many details. Eneida Brown and [place] will fill her in when the treatment plan is established.   She identifies having a strong social network of support. About a year ago, Minal Ponce made intentional change in diet to include daily supplement called [employer]. Would like to continue taking [employer] and is willing to discuss this with oncology RD. Dr. Kayleigh Hallman discussed dx, stage and standard treatment. Described aim of neoadjuvant chemoradiation (with xeloda) as decreasing the rate of local recurrence and helping with sphincter preservation. Following neoadjuvant therapy, she will be referred for surgery. Adjuvant chemo is recommended.       She agreed on the following plan with Dr. Kayleigh Hallman:  1. Consult with Dr. Huong Durham   2. Will follow up with Dr. Lorena Unger regarding surgery after completing neoadj therapy  3. Labs prior to treatment (ordered today)  4. Follow with Dr. Kayleigh Hallman every 2 weeks with cbc, cmp while on XRT and Xeloda  5. Chemo teach and consent today    ONN actions/plan:  1. Described ONN role and how to contact me. 2. Enc use of My Chart portal -- I'll reach out to establish contact. 3. Reviewed Dr. Anamika Cleaning recommended plan and assured Minal Kris jose juan Jay's understanding at this point . Recommended use of evidence based internet sites, julian www.nccn. org, as desired. 4. Referral to oncology RD  5. Described complimentary integrative supports in [facility] for Minal Ponce and family. 10. Minal Ponce denies barriers to care but noted that potential financial concern was brought up with RN during chemo teach:  She expressed concern over insurance deductible as they had just raised their deductibles on July 1.     7. Evidence of good coping and social support -- will follow  8. Stressed can contact Dr. Kayleigh Hallman and providers 24/7 as needed. Rec'ed new patient folder with print resources about [employer] supports and resources.     Eliane Isabel MS RN AOCNS

## 2018-07-18 NOTE — TELEPHONE ENCOUNTER
Call to patient to update on scheduled appointment with Dr. Vishal Phelps on 7/26/18 at [place] [address] at [facility] , will need to report to outpatient registration and allow 2 hours for appt. Message left to return call.

## 2018-07-18 NOTE — TELEPHONE ENCOUNTER
Faxed lab slips to patient at patient request for now, 2 weeks after medication start and 4 weeks after medication start. Dr Glenny Oakley wants her to return to clinic 2 weeks after medication start.   Advised patient via fax cover

## 2018-07-19 NOTE — TELEPHONE ENCOUNTER
Call received from patient, HIPAA verified. Advised of appointment with Dr. MAYA, verbalized understanding and thanked for call.

## 2018-07-23 ENCOUNTER — TELEPHONE (OUTPATIENT)
Dept: ONCOLOGY | Age: 58
End: 2018-07-23

## 2018-07-23 ENCOUNTER — PATIENT MESSAGE (OUTPATIENT)
Dept: ONCOLOGY | Age: 58
End: 2018-07-23

## 2018-07-23 NOTE — TELEPHONE ENCOUNTER
[facility] [place] at [address] [address]  Omar Delgadillo, [address]   W: [phone]  F: [phone]    Medical Nutrition Therapy    Nutrition Referral:    Referral received from Dr Danya Manzano. Newly diagnosed with rectal cancer, plan for Xeloda with radiation. Called and left a message with Ms. Kristopher Yan, explained that RD is available to address nutrition throughout the spectrum of care. She did mention taking Green blend, reviewed online and appropriate for her to take as desired. Provided contact information and days available at oncology office.        Signed By: Celestino Herrera, [address], [address] [place] , [address] [facility] [address]

## 2018-07-23 NOTE — TELEPHONE ENCOUNTER
Bibi Ho /[employer]. xeloda approved. Ruma [id number] needs confirmation/clarification    Spoke with Jay/pharmacist.  Clarified dosing and M-Friday scheduled x5 weeks. He will process ASAP. Co payment not known at this time.

## 2018-07-26 ENCOUNTER — HOSPITAL ENCOUNTER (OUTPATIENT)
Dept: RADIATION THERAPY | Age: 58
Discharge: HOME | End: 2018-07-26
Payer: COMMERCIAL

## 2018-07-27 LAB
ALBUMIN SERPL-MCNC: 4.6 G/DL (ref 3.5–5.5)
ALBUMIN/GLOB SERPL: 1.5 {RATIO} (ref 1.2–2.2)
ALP SERPL-CCNC: 109 IU/L (ref 39–117)
ALT SERPL-CCNC: 14 IU/L (ref 0–32)
AST SERPL-CCNC: 19 IU/L (ref 0–40)
BASOPHILS # BLD AUTO: 0 X10E3/UL (ref 0–0.2)
BASOPHILS NFR BLD AUTO: 0 %
BILIRUB SERPL-MCNC: 0.2 MG/DL (ref 0–1.2)
BUN SERPL-MCNC: 8 MG/DL (ref 6–24)
BUN/CREAT SERPL: 10 (ref 9–23)
CALCIUM SERPL-MCNC: 9.3 MG/DL (ref 8.7–10.2)
CHLORIDE SERPL-SCNC: 103 MMOL/L (ref 96–106)
CO2 SERPL-SCNC: 20 MMOL/L (ref 20–29)
CREAT SERPL-MCNC: 0.78 MG/DL (ref 0.57–1)
EOSINOPHIL # BLD AUTO: 0.2 X10E3/UL (ref 0–0.4)
EOSINOPHIL NFR BLD AUTO: 3 %
ERYTHROCYTE [DISTWIDTH] IN BLOOD BY AUTOMATED COUNT: 16.5 % (ref 12.3–15.4)
GLOBULIN SER CALC-MCNC: 3 G/DL (ref 1.5–4.5)
GLUCOSE SERPL-MCNC: 87 MG/DL (ref 65–99)
HCT VFR BLD AUTO: 39.4 % (ref 34–46.6)
HGB BLD-MCNC: 12.2 G/DL (ref 11.1–15.9)
IMM GRANULOCYTES # BLD: 0 X10E3/UL (ref 0–0.1)
IMM GRANULOCYTES NFR BLD: 0 %
LYMPHOCYTES # BLD AUTO: 2.6 X10E3/UL (ref 0.7–3.1)
LYMPHOCYTES NFR BLD AUTO: 31 %
MCH RBC QN AUTO: 23.7 PG (ref 26.6–33)
MCHC RBC AUTO-ENTMCNC: 31 G/DL (ref 31.5–35.7)
MCV RBC AUTO: 77 FL (ref 79–97)
MONOCYTES # BLD AUTO: 0.5 X10E3/UL (ref 0.1–0.9)
MONOCYTES NFR BLD AUTO: 6 %
NEUTROPHILS # BLD AUTO: 5.1 X10E3/UL (ref 1.4–7)
NEUTROPHILS NFR BLD AUTO: 60 %
PLATELET # BLD AUTO: 288 X10E3/UL (ref 150–379)
POTASSIUM SERPL-SCNC: 4.5 MMOL/L (ref 3.5–5.2)
PROT SERPL-MCNC: 7.6 G/DL (ref 6–8.5)
RBC # BLD AUTO: 5.14 X10E6/UL (ref 3.77–5.28)
SODIUM SERPL-SCNC: 142 MMOL/L (ref 134–144)
WBC # BLD AUTO: 8.4 X10E3/UL (ref 3.4–10.8)

## 2018-07-27 PROCEDURE — 77470 SPECIAL RADIATION TREATMENT: CPT

## 2018-07-30 ENCOUNTER — HOSPITAL ENCOUNTER (OUTPATIENT)
Dept: PET IMAGING | Age: 58
Discharge: HOME | End: 2018-07-30
Attending: RADIOLOGY
Payer: COMMERCIAL

## 2018-07-30 VITALS — WEIGHT: 209 LBS | HEIGHT: 67 IN | BODY MASS INDEX: 32.8 KG/M2

## 2018-07-30 DIAGNOSIS — C21.8 MALIGNANT NEOPLASM OF ANORECTUM (HCC): ICD-10-CM

## 2018-07-30 PROCEDURE — A9552 F18 FDG: HCPCS

## 2018-07-30 RX ORDER — SODIUM CHLORIDE 0.9 % (FLUSH) 0.9 %
10 SYRINGE (ML) INJECTION
Status: COMPLETED | OUTPATIENT
Start: 2018-07-30 | End: 2018-07-30

## 2018-07-30 RX ADMIN — Medication 10 ML: at 13:00

## 2018-08-01 ENCOUNTER — HOSPITAL ENCOUNTER (OUTPATIENT)
Age: 58
Setting detail: OUTPATIENT SURGERY
Discharge: HOME | End: 2018-08-01
Attending: INTERNAL MEDICINE | Admitting: INTERNAL MEDICINE
Payer: COMMERCIAL

## 2018-08-01 ENCOUNTER — ANESTHESIA (OUTPATIENT)
Dept: ENDOSCOPY | Age: 58
End: 2018-08-01
Payer: COMMERCIAL

## 2018-08-01 ENCOUNTER — ANESTHESIA EVENT (OUTPATIENT)
Dept: ENDOSCOPY | Age: 58
End: 2018-08-01
Payer: COMMERCIAL

## 2018-08-01 VITALS
DIASTOLIC BLOOD PRESSURE: 74 MMHG | SYSTOLIC BLOOD PRESSURE: 124 MMHG | HEIGHT: 67 IN | HEART RATE: 75 BPM | BODY MASS INDEX: 32.8 KG/M2 | WEIGHT: 209 LBS | TEMPERATURE: 97.8 F | OXYGEN SATURATION: 100 %

## 2018-08-01 PROCEDURE — 76040000019: Performed by: INTERNAL MEDICINE

## 2018-08-01 PROCEDURE — 74011250636 HC RX REV CODE- 250/636

## 2018-08-01 PROCEDURE — A4648 IMPLANTABLE TISSUE MARKER: HCPCS | Performed by: INTERNAL MEDICINE

## 2018-08-01 PROCEDURE — 74011250636 HC RX REV CODE- 250/636: Performed by: INTERNAL MEDICINE

## 2018-08-01 PROCEDURE — 76060000031 HC ANESTHESIA FIRST 0.5 HR: Performed by: INTERNAL MEDICINE

## 2018-08-01 RX ORDER — PROPOFOL 10 MG/ML
INJECTION, EMULSION INTRAVENOUS AS NEEDED
Status: DISCONTINUED | OUTPATIENT
Start: 2018-08-01 | End: 2018-08-01 | Stop reason: HOSPADM

## 2018-08-01 RX ORDER — NALOXONE HYDROCHLORIDE 0.4 MG/ML
0.4 INJECTION, SOLUTION INTRAMUSCULAR; INTRAVENOUS; SUBCUTANEOUS
Status: DISCONTINUED | OUTPATIENT
Start: 2018-08-01 | End: 2018-08-01 | Stop reason: HOSPADM

## 2018-08-01 RX ORDER — MIDAZOLAM HYDROCHLORIDE 1 MG/ML
.25-1 INJECTION, SOLUTION INTRAMUSCULAR; INTRAVENOUS
Status: DISCONTINUED | OUTPATIENT
Start: 2018-08-01 | End: 2018-08-01 | Stop reason: HOSPADM

## 2018-08-01 RX ORDER — LIDOCAINE HYDROCHLORIDE 20 MG/ML
INJECTION, SOLUTION EPIDURAL; INFILTRATION; INTRACAUDAL; PERINEURAL AS NEEDED
Status: DISCONTINUED | OUTPATIENT
Start: 2018-08-01 | End: 2018-08-01 | Stop reason: HOSPADM

## 2018-08-01 RX ORDER — SODIUM CHLORIDE 0.9 % (FLUSH) 0.9 %
5-10 SYRINGE (ML) INJECTION EVERY 8 HOURS
Status: DISCONTINUED | OUTPATIENT
Start: 2018-08-01 | End: 2018-08-01 | Stop reason: HOSPADM

## 2018-08-01 RX ORDER — FLUMAZENIL 0.1 MG/ML
0.2 INJECTION INTRAVENOUS
Status: DISCONTINUED | OUTPATIENT
Start: 2018-08-01 | End: 2018-08-01 | Stop reason: HOSPADM

## 2018-08-01 RX ORDER — SODIUM CHLORIDE 0.9 % (FLUSH) 0.9 %
5-10 SYRINGE (ML) INJECTION AS NEEDED
Status: DISCONTINUED | OUTPATIENT
Start: 2018-08-01 | End: 2018-08-01 | Stop reason: HOSPADM

## 2018-08-01 RX ORDER — SODIUM CHLORIDE 9 MG/ML
50 INJECTION, SOLUTION INTRAVENOUS CONTINUOUS
Status: DISCONTINUED | OUTPATIENT
Start: 2018-08-01 | End: 2018-08-01 | Stop reason: HOSPADM

## 2018-08-01 RX ORDER — ATROPINE SULFATE 0.1 MG/ML
0.5 INJECTION INTRAVENOUS
Status: DISCONTINUED | OUTPATIENT
Start: 2018-08-01 | End: 2018-08-01 | Stop reason: HOSPADM

## 2018-08-01 RX ORDER — FENTANYL CITRATE 50 UG/ML
100 INJECTION, SOLUTION INTRAMUSCULAR; INTRAVENOUS
Status: DISCONTINUED | OUTPATIENT
Start: 2018-08-01 | End: 2018-08-01 | Stop reason: HOSPADM

## 2018-08-01 RX ORDER — DEXTROMETHORPHAN/PSEUDOEPHED 2.5-7.5/.8
1.2 DROPS ORAL
Status: DISCONTINUED | OUTPATIENT
Start: 2018-08-01 | End: 2018-08-01 | Stop reason: HOSPADM

## 2018-08-01 RX ORDER — POLYETHYLENE GLYCOL 3350 17 G/17G
17 POWDER, FOR SOLUTION ORAL DAILY
COMMUNITY
End: 2018-12-03

## 2018-08-01 RX ORDER — EPINEPHRINE 0.1 MG/ML
1 INJECTION INTRACARDIAC; INTRAVENOUS
Status: DISCONTINUED | OUTPATIENT
Start: 2018-08-01 | End: 2018-08-01 | Stop reason: HOSPADM

## 2018-08-01 RX ADMIN — SODIUM CHLORIDE: 900 INJECTION, SOLUTION INTRAVENOUS at 14:49

## 2018-08-01 RX ADMIN — PROPOFOL 50 MG: 10 INJECTION, EMULSION INTRAVENOUS at 15:01

## 2018-08-01 RX ADMIN — LIDOCAINE HYDROCHLORIDE 40 MG: 20 INJECTION, SOLUTION EPIDURAL; INFILTRATION; INTRACAUDAL; PERINEURAL at 14:59

## 2018-08-01 RX ADMIN — PROPOFOL 75 MG: 10 INJECTION, EMULSION INTRAVENOUS at 14:59

## 2018-08-01 RX ADMIN — PROPOFOL 25 MG: 10 INJECTION, EMULSION INTRAVENOUS at 15:05

## 2018-08-01 NOTE — PROCEDURES
118 [facility].  [address] [place] [address] E Candy Pandey, [address]  [phone]                                 Flexible sigmoidoscopy with rectal endoscopic ultrasound     NAME:  Kenny Sahni   :   1960   MRN:   [id number]       Date/Time:  2018     Procedure Name: Flexible sigmoidoscopy with rectal endoscopic ultrasound     Indications: Rectal cancer    : Gladys Hunt MD    Referring Provider: -PROVIDER UNKNOWN    Anethesia/Sedation:  MAC anesthesia      Procedure Details   After infom consent was obtained for the procedure, with all risks and benefits of procedure explained the patient was taken to the endoscopy suite and placed in the left lateral decubitus position. Initially passed the rectal radial echoendoscope to 20 cm. The endoscope was withdrawn, and then the standard endoscope was passed. The patient tolerated the procedure well. Findings:   Endoscopic findings: A partially obstructing mass was seen at 4 cm from anal verge till 11 cm. Mass was friable and involved about 3/4th of the circumference.       EUS findings:  A partially circumferential mass was seen in the distal and mid rectum. Mass was seen extending through the muscularis propria. No ilfiltration of the adjacent organs was noted. The iliac vessels were seen and were normal. Two small lymph nodes about 6 mm in size appreciated adjacent to the mass.  No perirectal mass was seen. Specimen Removed:  None     Interventions:  Two 22 G preloaded fiducial markers 43 mm X 5 mm were placed on the upper margin of the mass. Complications: None. EBL:  None.     Recommendations:   -Continue current medications  -Follow up with Dr Sherif Chowdhury for Radiation therapy    Gladys Hunt MD  2018  3:20 PM

## 2018-08-01 NOTE — IP AVS SNAPSHOT
[address] [place] [address] 
[phone] Patient: Shital Salvador MRN: [id number]:[id number] About your hospitalization You were admitted on:  August 1, 2018 You last received care in the:  [facility] ENDOSCOPY You were discharged on:  August 1, 2018 Why you were hospitalized Your primary diagnosis was:  Not on File Follow-up Information None Your Scheduled Appointments Wednesday August 22, 2018  3:30 PM EDT Follow Up with Ankush Aggarwal MD  
[facility] Oncology at [address] 217 [facility] [address]  
[phone] Discharge Orders None A check isidoro indicates which time of day the medication should be taken. My Medications CONTINUE taking these medications Instructions Each Dose to Equal  
 Morning Noon Evening Bedtime  
 capecitabine 500 mg tablet Commonly known as:  XELODA Your last dose was: Your next dose is: Take 3 Tabs by mouth two (2) times a day for 25 days. With radiation. Do not take on Sat or Sun (when not receiving radiation) 825mg/m2        BSA 2.13 Rectal cancer-C20, C77.5  
 1500 mg Cholecalciferol (Vitamin D3) 3,000 unit Tab Your last dose was: Your next dose is: Take  by mouth. CLARITIN 10 mg tablet Generic drug:  loratadine Your last dose was: Your next dose is: Take 10 mg by mouth. 10 mg  
    
   
   
   
  
 methIMAzole 5 mg tablet Commonly known as:  TAPAZOLE Your last dose was: Your next dose is: Take 5 mg by mouth. 5 mg MIRALAX 17 gram packet Generic drug:  polyethylene glycol Your last dose was: Your next dose is: Take 17 g by mouth daily. 17 g  OTHER  
   
 Your last dose was: Your next dose is:    
   
   
 [employer] Discharge Instructions [address] 
217 [facility] [address] [place], [address] 
[phone] Lizzie Eden 
[id number] 
1960 COLON DISCHARGE INSTRUCTIONS DISCOMFORT: 
Redness at IV site- apply warm compress to area; if redness or soreness persist- contact your physician There may be a slight amount of blood passed from the rectum Gaseous discomfort- walking, belching will help relieve any discomfort You may not operate a vehicle for 12 hours You may not  engage in an occupation involving machinery or appliances for rest of today You may not  drink alcoholic beverages for at least 12 hours Avoid making any critical decisions for at least 24 hour DIET: 
 High fiber diet.  however -  remember your colon is empty and a heavy meal will produce gas. Avoid these foods:  vegetables, fried / greasy foods, carbonated drinks for today ACTIVITY: It is recommended that you spend the remainder of the day resting -  avoid any strenuous activity. CALL M.D. ANY SIGN OF: Increasing pain, nausea, vomiting Abdominal distension (swelling) New increased bleeding (oral or rectal) Fever (chills) Pain in chest area Bloody discharge from nose or mouth Shortness of breath Post procedure diagnosis:  1.- Rectal Cancer Follow-up Instructions: 
 
Call Dr. Georges Pompa for any questions or problems. If we took a biopsy please call the office within 2 weeks to discuss your                             pathology results. Telephone # [phone] Introducing [facility]! Dear Gaye Ramos: 
Thank you for requesting a [employer] account. Our records indicate that you already have an active [employer] account. You can access your account anytime at [email or web address] Did you know that you can access your hospital and ER discharge instructions at any time in Studio Moderna? You can also review all of your test results from your hospital stay or ER visit. Additional Information If you have questions, please visit the Frequently Asked Questions section of the [employer] website at [email or web address] Remember, [employer] Moderna is NOT to be used for urgent needs. For medical emergencies, dial 911. Now available from your iPhone and Android! Introducing Shade Ramirez As a [employer] patient, I wanted to make you aware of our electronic visit tool called Shade Ramirez. [employer] 24/7 allows you to connect within minutes with a medical provider 24 hours a day, seven days a week via a mobile device or tablet or logging into a secure website from your computer. You can access Shade Ramirez from anywhere in the [place]. A virtual visit might be right for you when you have a simple condition and feel like you just dont want to get out of bed, or cant get away from work for an appointment, when your regular [employer] provider is not available (evenings, weekends or holidays), or when youre out of town and need minor care. Electronic visits cost only $49 and if the [employer] 24/7 provider determines a prescription is needed to treat your condition, one can be electronically transmitted to a nearby pharmacy*. Please take a moment to enroll today if you have not already done so. The enrollment process is free and takes just a few minutes. To enroll, please download the [employer] 24/7 hebert to your tablet or phone, or visit [email or web address] to enroll on your computer. And, as an [address] [place] [address] patient with a [employer] account, the results of your visits will be scanned into your electronic medical record and your primary care provider will be able to view the scanned results. We urge you to continue to see your regular [facility] provider for your ongoing medical care. And while your primary care provider may not be the one available when you seek a Shade Ramirez virtual visit, the peace of mind you get from getting a real diagnosis real time can be priceless. For more information on Shade Seanxochilt, view our Frequently Asked Questions (FAQs) at [email or web address]. Sincerely, 
 
Halie Calderon MD 
Chief Medical Officer [employer] *:  certain medications cannot be prescribed via Shade Ramirez Providers Seen During Your Hospitalization Provider Specialty Primary office phone Alena Meza MD Gastroenterology [phone] Your Primary Care Physician (PCP) Primary Care Physician Office Phone Office Fax UNKNOWN, PROVIDER ** None ** ** None ** You are allergic to the following No active allergies Recent Documentation Height Weight BMI OB Status Smoking Status 1.702 m 94.8 kg 32.73 kg/m2 Postmenopausal Never Smoker Emergency Contacts Name Discharge Info Relation Home Work Mobile [employer] DISCHARGE CAREGIVER [3] Spouse [3] [phone] Patient Belongings The following personal items are in your possession at time of discharge: 
  Dental Appliances: None  Visual Aid: Glasses Please provide this summary of care documentation to your next provider. Signatures-by signing, you are acknowledging that this After Visit Summary has been reviewed with you and you have received a copy. Patient Signature:  ____________________________________________________________ Date:  ____________________________________________________________  
  
Lucía Chairez Provider Signature:  ____________________________________________________________ Date:  ____________________________________________________________

## 2018-08-01 NOTE — H&P
[address]  [address] [facility] [address] [facility] [address] [facility], [address] [facility]                                History and Physical     NAME: Jovani Valero   :  1960   MRN:  [id number]     HPI:  The patient was seen and examined. Past Surgical History:   Procedure Laterality Date    HX GI      cholecystectomy    HX HEENT      sinus sgy    HX ORTHOPAEDIC      carpel tunnel     Past Medical History:   Diagnosis Date    Thyroid disease     hyperthyroidism     Social History   Substance Use Topics    Smoking status: Never Smoker    Smokeless tobacco: Never Used    Alcohol use No     No Known Allergies  Family History   Problem Relation Age of Onset    Heart Disease Mother     Heart Disease Father      Current Facility-Administered Medications   Medication Dose Route Frequency    0.9% sodium chloride infusion  50 mL/hr IntraVENous CONTINUOUS    sodium chloride (NS) flush 5-10 mL  5-10 mL IntraVENous Q8H    sodium chloride (NS) flush 5-10 mL  5-10 mL IntraVENous PRN    midazolam (VERSED) injection 0.25-10 mg  0.25-10 mg IntraVENous Multiple    fentaNYL citrate (PF) injection 100 mcg  100 mcg IntraVENous MULTIPLE DOSE GIVEN    naloxone (NARCAN) injection 0.4 mg  0.4 mg IntraVENous Multiple    flumazenil (ROMAZICON) 0.1 mg/mL injection 0.2 mg  0.2 mg IntraVENous Multiple    simethicone (MYLICON) 69QF/6.5MI oral drops 80 mg  1.2 mL Oral Multiple    atropine injection 0.5 mg  0.5 mg IntraVENous ONCE PRN    EPINEPHrine (ADRENALIN) 0.1 mg/mL syringe 1 mg  1 mg Endoscopically ONCE PRN         PHYSICAL EXAM:  General: WD, WN. Alert, cooperative, no acute distress    HEENT: NC, Atraumatic. PERRLA, EOMI. Anicteric sclerae. Lungs:  CTA Bilaterally. No Wheezing/Rhonchi/Rales. Heart:  Regular  rhythm,  No murmur, No Rubs, No Gallops  Abdomen: Soft, Non distended, Non tender.  +Bowel sounds, no HSM  Extremities: No c/c/e  Neurologic:  CN 2-12 gi, Alert and oriented X 3.   No acute neurological distress   Psych:   Good insight. Not anxious nor agitated. The heart, lungs and mental status were satisfactory for the administration of MAC sedation and for the procedure.       Mallampati score: 3       Assessment:   · Rectal cancer    Plan:   · Endoscopic procedure  · MAC sedation   ·

## 2018-08-01 NOTE — IP AVS SNAPSHOT
Summary of Care Report The Summary of Care report has been created to help improve care coordination. Users with access to [employer] or [address] (Web-based application) may access additional patient information including the Discharge Summary. If you are not currently a [address] user and need more information, please call the number listed below in the [place] [address] section and ask to be connected with Medical Records. Facility Information Name Address Phone [address] [place] [id number] [phone] Patient Information Patient Name Sex  Ulysses Forester ([id number]) Female 1960 Discharge Information Admitting Provider Service Area Unit Lashawn Garcia MD / [address] [place] [address] [phone] Discharge Provider Discharge Date/Time Discharge Disposition Destination (none) (none) (none) (none) Patient Language Language ENGLISH [13] Hospital Problems as of 2018  Reviewed: 2018  3:21 PM by Yolanda Oro MD  
 None Non-Hospital Problems as of 2018  Reviewed: 2018  3:21 PM by Yolanda Oro MD  
  
  
  
 Class Noted - Resolved Last Modified Active Problems Mixed hyperlipidemia  3/24/2011 - Present 3/24/2011 by Prachi Padgett Entered by Prachi Padgett Rectal cancer metastasized to intrapelvic lymph node ([facility] [address]  2018 - Present 2018 by Yolanda Oro MD  
  Entered by Yolanda Oro MD  
  Class 1 obesity with serious comorbidity and body mass index (BMI) of 33.0 to 33.9 in adult  2018 - Present 2018 by Yolanda Oro MD  
  Entered by Yolanda Oro MD  
  
You are allergic to the following No active allergies Current Discharge Medication List  
  
CONTINUE these medications which have NOT CHANGED Dose & Instructions Dispensing Information Comments  
 capecitabine 500 mg tablet Commonly known as:  XELODA Dose:  1500 mg Take 3 Tabs by mouth two (2) times a day for 25 days. With radiation. Do not take on Sat or Sun (when not receiving radiation) 825mg/m2        BSA 2.13 Rectal cancer-C20, C77.5 Quantity:  150 Tab Refills:  0 Cholecalciferol (Vitamin D3) 3,000 unit Tab Take  by mouth. Refills:  0 CLARITIN 10 mg tablet Generic drug:  loratadine Dose:  10 mg Take 10 mg by mouth. Refills:  0  
   
 methIMAzole 5 mg tablet Commonly known as:  TAPAZOLE Dose:  5 mg Take 5 mg by mouth. Refills:  0 MIRALAX 17 gram packet Generic drug:  polyethylene glycol Dose:  17 g Take 17 g by mouth daily. Refills:  0  
   
 OTHER [employer] Refills:  0 Surgery Information ID Date/Time Status Primary Surgeon All Procedures Location [id number] 8/1/2018 1500 Unposted Jennifer Bowles MD RECTAL ENDOSCOPIC ULTRASOUND (EUS) FIDUCIAL MARKER PLACEMENT [facility] ENDOSCOPY Follow-up Information None Discharge Instructions [address] 
217 [facility] [address] [place], [address] 
[phone] Tung Weber 
[id number] 
1960 COLON DISCHARGE INSTRUCTIONS DISCOMFORT: 
Redness at IV site- apply warm compress to area; if redness or soreness persist- contact your physician There may be a slight amount of blood passed from the rectum Gaseous discomfort- walking, belching will help relieve any discomfort You may not operate a vehicle for 12 hours You may not  engage in an occupation involving machinery or appliances for rest of today You may not  drink alcoholic beverages for at least 12 hours Avoid making any critical decisions for at least 24 hour DIET: 
 High fiber diet.  
  however -  remember your colon is empty and a heavy meal will produce gas. 
 Avoid these foods:  vegetables, fried / greasy foods, carbonated drinks for today ACTIVITY: It is recommended that you spend the remainder of the day resting -  avoid any strenuous activity. CALL M.D. ANY SIGN OF: Increasing pain, nausea, vomiting Abdominal distension (swelling) New increased bleeding (oral or rectal) Fever (chills) Pain in chest area Bloody discharge from nose or mouth Shortness of breath Post procedure diagnosis:  1.- Rectal Cancer Follow-up Instructions: 
 
Call Dr. Clarisa Celaya for any questions or problems. If we took a biopsy please call the office within 2 weeks to discuss your                             pathology results. Telephone # [phone] Chart Review Routing History Recipient Method Report Sent By Jet Stringer MD  
Fax: [phone] Phone: [phone] Fax Kalli Hernandez MD [[id number]] 7/6/2018  6:30 PM 7/6/2018 Maggi Stringer MD  
Fax: [phone] Phone: [phone] Fax Notes Report Maggi Stringer MD [[id number]] 7/12/2018  2:30 PM 7/11/2018 Maggi Stringer MD  
Fax: [phone] Phone: [phone] Fax Notes Report Maggi Stringer MD [[id number]] 7/12/2018  2:31 PM 7/11/2018

## 2018-08-01 NOTE — PROGRESS NOTES

## 2018-08-01 NOTE — ANESTHESIA PREPROCEDURE EVALUATION
Anesthetic History No history of anesthetic complications Review of Systems / Medical History Patient summary reviewed, nursing notes reviewed and pertinent labs reviewed Pulmonary Within defined limits Neuro/Psych Within defined limits Cardiovascular Within defined limits GI/Hepatic/Renal 
Within defined limits Endo/Other Hyperthyroidism Obesity and cancer Other Findings Physical Exam 
 
Airway Mallampati: II 
TM Distance: > 6 cm Neck ROM: normal range of motion Mouth opening: Normal 
 
 Cardiovascular Regular rate and rhythm,  S1 and S2 normal,  no murmur, click, rub, or gallop Dental 
No notable dental hx Pulmonary Breath sounds clear to auscultation Abdominal 
GI exam deferred Other Findings Anesthetic Plan ASA: 2 Anesthesia type: MAC Induction: Intravenous Anesthetic plan and risks discussed with: Patient

## 2018-08-01 NOTE — ANESTHESIA POSTPROCEDURE EVALUATION
Post-Anesthesia Evaluation and Assessment Patient: Ren Fernandez MRN: [id number]  SSN: [id number] YOB: 1960  Age: 63 y.o. Sex: female Cardiovascular Function/Vital Signs Visit Vitals  /61  Pulse 83  Temp 36.7 °C (98 °F)  Resp 17  Ht 5' 7\" (1.702 m)  Wt 94.8 kg (209 lb)  SpO2 98%  BMI 32.73 kg/m2 Patient is status post MAC anesthesia for Procedure(s): RECTAL ENDOSCOPIC ULTRASOUND (EUS) FIDUCIAL MARKER PLACEMENT. Nausea/Vomiting: None Postoperative hydration reviewed and adequate. Pain: 
Pain Scale 1: Numeric (0 - 10) (08/01/18 1452) Pain Intensity 1: 0 (08/01/18 1452) Managed Neurological Status: At baseline Mental Status and Level of Consciousness: Arousable Pulmonary Status:  
O2 Device: Nasal cannula (08/01/18 1508) Adequate oxygenation and airway patent Complications related to anesthesia: None Post-anesthesia assessment completed. No concerns Signed By: Duke Galvez MD   
 August 1, 2018

## 2018-08-01 NOTE — ROUTINE PROCESS
Andrea Ya  1960  [id number]    Situation:  Verbal report received from: Angel Saucedo  Procedure: Procedure(s):  RECTAL ENDOSCOPIC ULTRASOUND (EUS)  FIDUCIAL MARKER PLACEMENT    Background:    Preoperative diagnosis: MALIGNANT NEOPLASM OF RECTUM  Postoperative diagnosis: 1.- Rectal Cancer    :  Dr. Cherelle Corral  Assistant(s): Endoscopy Technician-1: Hollie Garcia  Endoscopy RN-1: Aditya Uriarte RN    Specimens: * No specimens in log *  H. Pylori  no    Assessment:  Intra-procedure medications   Anesthesia gave intra-procedure sedation and medications, see anesthesia flow sheet yes    Intravenous fluids: NS@ KVO     Vital signs stable     Abdominal assessment: round and soft     Recommendation:  Discharge patient per MD order.   Return to floor  Family or Friend   Permission to share finding with family or friend yes

## 2018-08-01 NOTE — DISCHARGE INSTRUCTIONS
[facility].  [address] [address] [address], [address]  [address]  [id number]  1960    COLON DISCHARGE INSTRUCTIONS    DISCOMFORT:  Redness at IV site- apply warm compress to area; if redness or soreness persist- contact your physician  There may be a slight amount of blood passed from the rectum  Gaseous discomfort- walking, belching will help relieve any discomfort  You may not operate a vehicle for 12 hours  You may not  engage in an occupation involving machinery or appliances for rest of today  You may not  drink alcoholic beverages for at least 12 hours  Avoid making any critical decisions for at least 24 hour    DIET:   High fiber diet. - however -  remember your colon is empty and a heavy meal will produce gas. Avoid these foods:  vegetables, fried / greasy foods, carbonated drinks for today     ACTIVITY:  It is recommended that you spend the remainder of the day resting -  avoid any strenuous activity. CALL M.D. ANY SIGN OF:   Increasing pain, nausea, vomiting  Abdominal distension (swelling)  New increased bleeding (oral or rectal)  Fever (chills)  Pain in chest area  Bloody discharge from nose or mouth  Shortness of breath      Post procedure diagnosis:  1.- Rectal Cancer    Follow-up Instructions:    Call Dr. Laura Estevez for any questions or problems. If we took a biopsy please call the office within 2 weeks to discuss your                             pathology results.  Telephone # [phone]

## 2018-08-02 ENCOUNTER — TELEPHONE (OUTPATIENT)
Dept: ONCOLOGY | Age: 58
End: 2018-08-02

## 2018-08-03 ENCOUNTER — HOSPITAL ENCOUNTER (OUTPATIENT)
Dept: RADIATION THERAPY | Age: 58
Discharge: HOME | End: 2018-08-03
Payer: COMMERCIAL

## 2018-08-03 ENCOUNTER — HOSPITAL ENCOUNTER (OUTPATIENT)
Dept: ULTRASOUND IMAGING | Age: 58
Discharge: HOME | End: 2018-08-03
Attending: INTERNAL MEDICINE

## 2018-08-03 PROCEDURE — 77300 RADIATION THERAPY DOSE PLAN: CPT

## 2018-08-03 PROCEDURE — 77334 RADIATION TREATMENT AID(S): CPT

## 2018-08-03 PROCEDURE — 77295 3-D RADIOTHERAPY PLAN: CPT

## 2018-08-06 ENCOUNTER — HOSPITAL ENCOUNTER (OUTPATIENT)
Dept: RADIATION THERAPY | Age: 58
Discharge: HOME | End: 2018-08-06
Payer: COMMERCIAL

## 2018-08-06 PROCEDURE — 77387 GUIDANCE FOR RADJ TX DLVR: CPT

## 2018-08-06 PROCEDURE — 77412 RADIATION TX DELIVERY LVL 3: CPT

## 2018-08-06 PROCEDURE — 77417 THER RADIOLOGY PORT IMAGE(S): CPT

## 2018-08-07 ENCOUNTER — HOSPITAL ENCOUNTER (OUTPATIENT)
Dept: RADIATION THERAPY | Age: 58
Discharge: HOME | End: 2018-08-07
Payer: COMMERCIAL

## 2018-08-07 PROCEDURE — 77387 GUIDANCE FOR RADJ TX DLVR: CPT

## 2018-08-07 PROCEDURE — 77412 RADIATION TX DELIVERY LVL 3: CPT

## 2018-08-08 ENCOUNTER — HOSPITAL ENCOUNTER (OUTPATIENT)
Dept: RADIATION THERAPY | Age: 58
Discharge: HOME | End: 2018-08-08
Payer: COMMERCIAL

## 2018-08-08 PROCEDURE — 77387 GUIDANCE FOR RADJ TX DLVR: CPT

## 2018-08-08 PROCEDURE — 77412 RADIATION TX DELIVERY LVL 3: CPT

## 2018-08-09 ENCOUNTER — HOSPITAL ENCOUNTER (OUTPATIENT)
Dept: RADIATION THERAPY | Age: 58
Discharge: HOME | End: 2018-08-09
Payer: COMMERCIAL

## 2018-08-09 ENCOUNTER — TELEPHONE (OUTPATIENT)
Dept: ONCOLOGY | Age: 58
End: 2018-08-09

## 2018-08-09 DIAGNOSIS — C20 RECTAL CANCER METASTASIZED TO INTRAPELVIC LYMPH NODE (HCC): ICD-10-CM

## 2018-08-09 DIAGNOSIS — C77.5 RECTAL CANCER METASTASIZED TO INTRAPELVIC LYMPH NODE (HCC): ICD-10-CM

## 2018-08-09 NOTE — TELEPHONE ENCOUNTER
Should check BP and labs. When are labs planned? If she continues to feel this way can skip one dose of Xeloda (am or pm) and see if this improves symptoms.

## 2018-08-09 NOTE — TELEPHONE ENCOUNTER
Call to patient. HIPAA verified    Started radiation and xeloda on 8/6/18  On Tuesday night she was really tired. A different kind of tired. Woke up feeling exhausted. Radiation yesterday. Dizzy a few times. Denies aches or pains. Denies fevers. Felt a little warm last night. Hydrating well.  4-5 16oz a day. Without an appetite but eating. Soft stools. She doesn't feel like she is strong enough to drive. I asked her to monitor her symptoms and advise if symptoms continue or any other symptoms develop.

## 2018-08-10 ENCOUNTER — HOSPITAL ENCOUNTER (OUTPATIENT)
Dept: RADIATION THERAPY | Age: 58
Discharge: HOME | End: 2018-08-10
Payer: COMMERCIAL

## 2018-08-13 ENCOUNTER — HOSPITAL ENCOUNTER (OUTPATIENT)
Dept: RADIATION THERAPY | Age: 58
Discharge: HOME | End: 2018-08-13
Payer: COMMERCIAL

## 2018-08-13 PROCEDURE — 77412 RADIATION TX DELIVERY LVL 3: CPT

## 2018-08-13 PROCEDURE — 77417 THER RADIOLOGY PORT IMAGE(S): CPT

## 2018-08-14 ENCOUNTER — HOSPITAL ENCOUNTER (OUTPATIENT)
Dept: RADIATION THERAPY | Age: 58
Discharge: HOME | End: 2018-08-14
Payer: COMMERCIAL

## 2018-08-14 LAB
ALBUMIN SERPL-MCNC: 3.7 G/DL (ref 3.5–5.5)
ALBUMIN/GLOB SERPL: 1.4 {RATIO} (ref 1.2–2.2)
ALP SERPL-CCNC: 90 IU/L (ref 39–117)
ALT SERPL-CCNC: 11 IU/L (ref 0–32)
AST SERPL-CCNC: 15 IU/L (ref 0–40)
BASOPHILS # BLD AUTO: 0 X10E3/UL (ref 0–0.2)
BASOPHILS NFR BLD AUTO: 0 %
BILIRUB SERPL-MCNC: <0.2 MG/DL (ref 0–1.2)
BUN SERPL-MCNC: 13 MG/DL (ref 6–24)
BUN/CREAT SERPL: 19 (ref 9–23)
CALCIUM SERPL-MCNC: 8.9 MG/DL (ref 8.7–10.2)
CHLORIDE SERPL-SCNC: 103 MMOL/L (ref 96–106)
CO2 SERPL-SCNC: 24 MMOL/L (ref 20–29)
CREAT SERPL-MCNC: 0.67 MG/DL (ref 0.57–1)
EOSINOPHIL # BLD AUTO: 0.2 X10E3/UL (ref 0–0.4)
EOSINOPHIL NFR BLD AUTO: 3 %
ERYTHROCYTE [DISTWIDTH] IN BLOOD BY AUTOMATED COUNT: 16.6 % (ref 12.3–15.4)
GLOBULIN SER CALC-MCNC: 2.7 G/DL (ref 1.5–4.5)
GLUCOSE SERPL-MCNC: 103 MG/DL (ref 65–99)
HCT VFR BLD AUTO: 35.9 % (ref 34–46.6)
HGB BLD-MCNC: 10.9 G/DL (ref 11.1–15.9)
IMM GRANULOCYTES # BLD: 0 X10E3/UL (ref 0–0.1)
IMM GRANULOCYTES NFR BLD: 1 %
LYMPHOCYTES # BLD AUTO: 1.5 X10E3/UL (ref 0.7–3.1)
LYMPHOCYTES NFR BLD AUTO: 27 %
MCH RBC QN AUTO: 23.9 PG (ref 26.6–33)
MCHC RBC AUTO-ENTMCNC: 30.4 G/DL (ref 31.5–35.7)
MCV RBC AUTO: 79 FL (ref 79–97)
MONOCYTES # BLD AUTO: 0.4 X10E3/UL (ref 0.1–0.9)
MONOCYTES NFR BLD AUTO: 7 %
NEUTROPHILS # BLD AUTO: 3.5 X10E3/UL (ref 1.4–7)
NEUTROPHILS NFR BLD AUTO: 62 %
PLATELET # BLD AUTO: 257 X10E3/UL (ref 150–379)
POTASSIUM SERPL-SCNC: 5.2 MMOL/L (ref 3.5–5.2)
PROT SERPL-MCNC: 6.4 G/DL (ref 6–8.5)
RBC # BLD AUTO: 4.56 X10E6/UL (ref 3.77–5.28)
SODIUM SERPL-SCNC: 140 MMOL/L (ref 134–144)
WBC # BLD AUTO: 5.6 X10E3/UL (ref 3.4–10.8)

## 2018-08-14 PROCEDURE — 77412 RADIATION TX DELIVERY LVL 3: CPT

## 2018-08-14 PROCEDURE — 77336 RADIATION PHYSICS CONSULT: CPT

## 2018-08-15 ENCOUNTER — HOSPITAL ENCOUNTER (OUTPATIENT)
Dept: RADIATION THERAPY | Age: 58
Discharge: HOME | End: 2018-08-15
Payer: COMMERCIAL

## 2018-08-15 PROCEDURE — 77387 GUIDANCE FOR RADJ TX DLVR: CPT

## 2018-08-15 PROCEDURE — 77412 RADIATION TX DELIVERY LVL 3: CPT

## 2018-08-16 ENCOUNTER — HOSPITAL ENCOUNTER (OUTPATIENT)
Dept: RADIATION THERAPY | Age: 58
Discharge: HOME | End: 2018-08-16
Payer: COMMERCIAL

## 2018-08-16 PROCEDURE — 77387 GUIDANCE FOR RADJ TX DLVR: CPT

## 2018-08-16 PROCEDURE — 77412 RADIATION TX DELIVERY LVL 3: CPT

## 2018-08-20 ENCOUNTER — HOSPITAL ENCOUNTER (OUTPATIENT)
Dept: RADIATION THERAPY | Age: 58
Discharge: HOME | End: 2018-08-20
Payer: COMMERCIAL

## 2018-08-20 PROCEDURE — 77412 RADIATION TX DELIVERY LVL 3: CPT

## 2018-08-20 PROCEDURE — 77387 GUIDANCE FOR RADJ TX DLVR: CPT

## 2018-08-21 ENCOUNTER — HOSPITAL ENCOUNTER (OUTPATIENT)
Dept: RADIATION THERAPY | Age: 58
Discharge: HOME | End: 2018-08-21
Payer: COMMERCIAL

## 2018-08-21 PROCEDURE — 77387 GUIDANCE FOR RADJ TX DLVR: CPT

## 2018-08-21 PROCEDURE — 77412 RADIATION TX DELIVERY LVL 3: CPT

## 2018-08-22 ENCOUNTER — HOSPITAL ENCOUNTER (OUTPATIENT)
Dept: RADIATION THERAPY | Age: 58
Discharge: HOME | End: 2018-08-22
Payer: COMMERCIAL

## 2018-08-22 ENCOUNTER — OFFICE VISIT (OUTPATIENT)
Dept: ONCOLOGY | Age: 58
End: 2018-08-22

## 2018-08-22 VITALS
SYSTOLIC BLOOD PRESSURE: 117 MMHG | RESPIRATION RATE: 20 BRPM | BODY MASS INDEX: 33.37 KG/M2 | OXYGEN SATURATION: 97 % | DIASTOLIC BLOOD PRESSURE: 79 MMHG | WEIGHT: 212.6 LBS | TEMPERATURE: 98.1 F | HEIGHT: 67 IN | HEART RATE: 75 BPM

## 2018-08-22 DIAGNOSIS — C20 RECTAL CANCER METASTASIZED TO INTRAPELVIC LYMPH NODE (HCC): Primary | ICD-10-CM

## 2018-08-22 DIAGNOSIS — C77.5 RECTAL CANCER METASTASIZED TO INTRAPELVIC LYMPH NODE (HCC): Primary | ICD-10-CM

## 2018-08-22 DIAGNOSIS — E66.9 CLASS 1 OBESITY WITH SERIOUS COMORBIDITY AND BODY MASS INDEX (BMI) OF 33.0 TO 33.9 IN ADULT, UNSPECIFIED OBESITY TYPE: ICD-10-CM

## 2018-08-22 NOTE — PROGRESS NOTES
[facility] at [address] [place] [address] [address], [place]: [phone]  F: [phone]    Reason for Visit:   Boni Lang is a 63 y.o. female who is seen in follow up for Rectal cancer. Treatment History:   · 6/29/18-colonoscopy was notable for a bleeding mass of malignant appearance in the mid rectum and distal rectum at a distance between 4 cm to 11 cm from the anus. Caused partial obstruction. · 7/6/18-CT chest abdomen pelvis showed rectal thickening, some perirectal stranding and several subcentimeter perirectal lymph nodes  · 7/11/18: Rectal ultrasound showed a partially circumferential mass seen in the distal rectum extending through the muscularis propria, 2 small lymph nodes about 6 mm in size adjacent to the mass. · 8/7/18- Xeloda with Radiation    History of Present Illness:   Patient is a 63 y.o. female who had not had a screening colonoscopy developed constipation and subsequently BRBPR x 5-6 months. She wondered if this was secondary to L thyroxine. However symptoms continued to progress and she was referred to GI for further evaluation. Further investigations as above. She has since met with Dr. Mario Vargas- Colorectal surgery and GI Dr. Jamila Marsh. She is feeling well today. She started radiation on 8/7/18 with Dr. Vida Cast with concurrent Xeloda. She missed 8/9 and 8/10 due to feeling unwell and missed 8/17 because she was out of town. She will make up for these days and will get a total of 28 treatments. She denies nausea or vomiting. She has been having normal bowel movements without pain or bleeding. She has a hemhroid that sometimes causes a small streak of blood but this is intermittent. Denies mouth sores, fevers, or nail changes. She has 1 adopted daughter and supportive    Quit smoking in 1992  Rare ETOH    No FH of cancer.      Labs every 2 weeks    Past Medical History:   Diagnosis Date    Thyroid disease     hyperthyroidism Past Surgical History:   Procedure Laterality Date    HX GI      cholecystectomy    HX HEENT      sinus sgy    HX ORTHOPAEDIC      carpel tunnel      Social History   Substance Use Topics    Smoking status: Never Smoker    Smokeless tobacco: Never Used    Alcohol use No      Family History   Problem Relation Age of Onset    Heart Disease Mother     Heart Disease Father      Current Outpatient Prescriptions   Medication Sig    OTHER [employer] Cholecalciferol, Vitamin D3, 3,000 unit tab Take  by mouth.  methIMAzole (TAPAZOLE) 5 mg tablet Take 5 mg by mouth.  loratadine (CLARITIN) 10 mg tablet Take 10 mg by mouth.  polyethylene glycol (MIRALAX) 17 gram packet Take 17 g by mouth daily. No current facility-administered medications for this visit. No Known Allergies     Review of Systems: A complete review of systems was obtained, negative except as described above. Physical Exam:     Visit Vitals    /79    Pulse 75    Temp 98.1 °F (36.7 °C)    Resp 20    Ht 5' 7\" (1.702 m)    Wt 212 lb 9.6 oz (96.4 kg)    SpO2 97%    BMI 33.3 kg/m2     ECOG PS: 0  General: No distress  Eyes: PERRL, anicteric sclerae  HENT: Atraumatic, OP clear  Neck: Supple  Lymphatic: No cervical, supraclavicular adenopathy  Respiratory: CTAB, normal respiratory effort  CV: Normal rate, regular rhythm, no murmurs, no peripheral edema  GI: Soft, nontender, nondistended, no masses, no hepatomegaly, no splenomegaly  MS: Normal gait and station. Digits without clubbing or cyanosis. Skin: No rashes, ecchymoses, or petechiae. Normal temperature, turgor, and texture. Psych: Alert, oriented, appropriate affect, normal judgment/insight    Results:     Lab Results   Component Value Date/Time    WBC 5.6 08/13/2018 04:01 PM    HGB 10.9 (L) 08/13/2018 04:01 PM    HCT 35.9 08/13/2018 04:01 PM    PLATELET 550 48/01/7891 04:01 PM    MCV 79 08/13/2018 04:01 PM    ABS.  NEUTROPHILS 3.5 08/13/2018 04:01 PM Lab Results   Component Value Date/Time    Sodium 140 08/13/2018 04:01 PM    Potassium 5.2 08/13/2018 04:01 PM    Chloride 103 08/13/2018 04:01 PM    CO2 24 08/13/2018 04:01 PM    Glucose 103 (H) 08/13/2018 04:01 PM    BUN 13 08/13/2018 04:01 PM    Creatinine 0.67 08/13/2018 04:01 PM    GFR est  08/13/2018 04:01 PM    GFR est non-AA 98 08/13/2018 04:01 PM    Calcium 8.9 08/13/2018 04:01 PM     Lab Results   Component Value Date/Time    Bilirubin, total <0.2 08/13/2018 04:01 PM    ALT (SGPT) 11 08/13/2018 04:01 PM    AST (SGOT) 15 08/13/2018 04:01 PM    Alk. phosphatase 90 08/13/2018 04:01 PM    Protein, total 6.4 08/13/2018 04:01 PM    Albumin 3.7 08/13/2018 04:01 PM     Records reviewed and summarized above. Pathology report(s) reviewed above. Radiology report(s) reviewed above. 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    All available pathology, imaging, lab results and progress notes were reviewed. Consistent with clinical stage III adenocarcinoma of the mid and distal third of rectum. We discussed management of rectal cancer with curative intent. Reviewed the rationale behind neoadjuvant chemotherapy with concurrent radiation- goals being decreasing the rate of local recurrence and helping with sphincter preservation    Reviewed the results of the [facility] Rectal cancer study that compared preop Chemo XRT followed by surgery to surgery alone    At 46 months median follow-up, preoperative chemoradiotherapy was associated with a significantly lower pelvic relapse rate (6 versus 13 percent with postoperative therapy). The five-year disease-free survival (DFS; 68 versus 65 percent) and overall survival rates (76 versus 74 percent) were similar for preoperative and postoperative therapy, respectively; 10-year rates were also comparable (DFS approximately 68 percent in both groups, overall survival approximately 60 percent in both groups).     Discussed the regimen in detail ( 825 mg/m2 twice daily, five days per week)    Is currently tolerating 1500 mg of Xeloda twice daily with radiation that she started on 8/7/18. CBC was reviewed from 8/13/18    We also discussed post op adjuvant chemotherapy for 4 months       2) Hypothyroidism  Controlled    3) GIB  Resolved, most recent Hgb 10.9   Continue to monitor CBC every 2 weeks  Secondary to 1  Will arrange for IV iron if indicated      Plan:     · Continue Xeloda 1500 mg twice daily with Radiation  · Follow with   · Follow with colorectal surgery 6 weeks after completion of XRT and radiation    See me every 2 weeks with cbc, cmp while on XRT and Xeloda    I appreciate the opportunity to participate in Ms. Elisha Galeano's care.     Signed By: Guanakito Moraes MD

## 2018-08-22 NOTE — MR AVS SNAPSHOT
[address] [address] [address] [place] [address] 
[phone] Patient: Anabell Wagner MRN: [id number] ABL:[id number] Visit Information Date & Time Provider Department Dept. Phone Encounter #  
 8/22/2018  3:30 PM Harjit Carbajal  [facility] at [address] [id number] Upcoming Health Maintenance Date Due Hepatitis C Screening 1960 Pneumococcal 19-64 Highest Risk (1 of 3 - PCV13) 4/20/1980 DTaP/Tdap/Td series (1 - Tdap) 4/20/1982 PAP AKA CERVICAL CYTOLOGY 4/20/1982 BREAST CANCER SCRN MAMMOGRAM 4/20/2011 FOBT Q 1 YEAR AGE 50-75 4/20/2011 Influenza Age 5 to Adult 8/1/2018 Allergies as of 8/22/2018  Review Complete On: 8/1/2018 By: Juan Nielson RN No Known Allergies Current Immunizations  Never Reviewed No immunizations on file. Not reviewed this visit Vitals BP Pulse Temp Resp Height(growth percentile) Weight(growth percentile) 117/79 75 98.1 °F (36.7 °C) 20 5' 7\" (1.702 m) 212 lb 9.6 oz (96.4 kg) SpO2 BMI OB Status Smoking Status 97% 33.3 kg/m2 Postmenopausal Never Smoker Vitals History BMI and BSA Data Body Mass Index Body Surface Area  
 33.3 kg/m 2 2.13 m 2 Preferred Pharmacy Pharmacy Name Phone [id number] [address] [phone] Your Updated Medication List  
  
   
This list is accurate as of 8/22/18  4:01 PM.  Always use your most recent med list.  
  
  
  
  
 Cholecalciferol (Vitamin D3) 3,000 unit Tab Take  by mouth. CLARITIN 10 mg tablet Generic drug:  loratadine Take 10 mg by mouth. methIMAzole 5 mg tablet Commonly known as:  TAPAZOLE Take 5 mg by mouth. MIRALAX 17 gram packet Generic drug:  polyethylene glycol Take 17 g by mouth daily. OTHER [employer] To-Do List   
 08/22/2018  4:15 PM  
 Appointment with RAD [facility] at [address] [place] [address] ([phone])  
  
 08/23/2018 4:15 PM  
  Appointment with RAD [facility] at [address] [place] [address] ([phone])  
  
 08/24/2018 4:15 PM  
  Appointment with RAD [facility] at [address] [place] [address] ([phone])  
  
 08/27/2018 4:15 PM  
  Appointment with RAD [facility] at [address] [place] [address] ([phone])  
  
 08/28/2018 4:15 PM  
  Appointment with RAD [facility] at [address] [place] [address] ([phone])  
  
 08/29/2018 4:15 PM  
  Appointment with RAD [facility] at [address] [place] [address] ([phone])  
  
 08/30/2018 4:15 PM  
  Appointment with RAD [facility] at [address] [place] [address] ([phone])  
  
 08/31/2018 4:15 PM  
  Appointment with RAD [facility] at [address] [place] [address] ([phone])  
  
 09/04/2018 4:15 PM  
  Appointment with RAD [facility] at [address] [place] [address] ([phone])  
  
 09/05/2018 4:15 PM  
  Appointment with RAD [facility] at [address] [place] [address] ([phone])  
  
 09/06/2018 4:15 PM  
  Appointment with RAD [facility] at [address] [place] [address] ([phone])  
  
 09/07/2018 4:15 PM  
  Appointment with RAD [facility] at [address] [place] [address] ([phone])  
  
 09/10/2018 4:15 PM  
  Appointment with RAD [facility] at [address] [place] [address] ([phone])  
  
 09/11/2018 4:15 PM  
  Appointment with RAD [facility] at [address] [place] [address] ([phone])  
  
 09/12/2018 4:15 PM  
  Appointment with RAD ON[facility] THERAPY [facility] at [address] [place] [address] ([phone])  
  
 09/13/2018 4:15 PM  
  Appointment with RAD ON[facility] THERAPY [facility] at [address] [place] [address] ([phone]) [facility]! Dear Martha Marcano: 
Thank you for requesting a [employer] account. Our records indicate that you already have an active [employer] account. You can access your account anytime at [email or web address] Did you know that you can access your hospital and ER discharge instructions at any time in [employer]? You can also review all of your test results from your hospital stay or ER visit. Additional Information If you have questions, please visit the Frequently Asked Questions section of the [employer] website at [email or web address] Remember, [employer] is NOT to be used for urgent needs. For medical emergencies, dial 911. Now available from your iPhone and Android! Please provide this summary of care documentation to your next provider. Your primary care clinician is listed as PROVIDER UNKNOWN. If you have any questions after today's visit, please call [phone].

## 2018-08-23 ENCOUNTER — HOSPITAL ENCOUNTER (OUTPATIENT)
Dept: RADIATION THERAPY | Age: 58
Discharge: HOME | End: 2018-08-23
Payer: COMMERCIAL

## 2018-08-23 PROCEDURE — 77412 RADIATION TX DELIVERY LVL 3: CPT

## 2018-08-23 PROCEDURE — 77336 RADIATION PHYSICS CONSULT: CPT

## 2018-08-24 ENCOUNTER — HOSPITAL ENCOUNTER (OUTPATIENT)
Dept: RADIATION THERAPY | Age: 58
Discharge: HOME | End: 2018-08-24
Payer: COMMERCIAL

## 2018-08-24 PROCEDURE — 77387 GUIDANCE FOR RADJ TX DLVR: CPT

## 2018-08-24 PROCEDURE — 77412 RADIATION TX DELIVERY LVL 3: CPT

## 2018-08-27 ENCOUNTER — HOSPITAL ENCOUNTER (OUTPATIENT)
Dept: RADIATION THERAPY | Age: 58
Discharge: HOME | End: 2018-08-27
Payer: COMMERCIAL

## 2018-08-27 PROCEDURE — 77387 GUIDANCE FOR RADJ TX DLVR: CPT

## 2018-08-27 PROCEDURE — 77412 RADIATION TX DELIVERY LVL 3: CPT

## 2018-08-28 ENCOUNTER — HOSPITAL ENCOUNTER (OUTPATIENT)
Dept: RADIATION THERAPY | Age: 58
Discharge: HOME | End: 2018-08-28
Payer: COMMERCIAL

## 2018-08-28 LAB
ALBUMIN SERPL-MCNC: 3.8 G/DL (ref 3.5–5.5)
ALBUMIN/GLOB SERPL: 1.4 {RATIO} (ref 1.2–2.2)
ALP SERPL-CCNC: 91 IU/L (ref 39–117)
ALT SERPL-CCNC: 13 IU/L (ref 0–32)
AST SERPL-CCNC: 16 IU/L (ref 0–40)
BASOPHILS # BLD AUTO: 0 X10E3/UL (ref 0–0.2)
BASOPHILS NFR BLD AUTO: 0 %
BILIRUB SERPL-MCNC: 0.2 MG/DL (ref 0–1.2)
BUN SERPL-MCNC: 9 MG/DL (ref 6–24)
BUN/CREAT SERPL: 12 (ref 9–23)
CALCIUM SERPL-MCNC: 8.5 MG/DL (ref 8.7–10.2)
CHLORIDE SERPL-SCNC: 100 MMOL/L (ref 96–106)
CO2 SERPL-SCNC: 24 MMOL/L (ref 20–29)
CREAT SERPL-MCNC: 0.73 MG/DL (ref 0.57–1)
EOSINOPHIL # BLD AUTO: 0.1 X10E3/UL (ref 0–0.4)
EOSINOPHIL NFR BLD AUTO: 3 %
ERYTHROCYTE [DISTWIDTH] IN BLOOD BY AUTOMATED COUNT: 19.2 % (ref 12.3–15.4)
FERRITIN SERPL-MCNC: 23 NG/ML (ref 15–150)
GLOBULIN SER CALC-MCNC: 2.8 G/DL (ref 1.5–4.5)
GLUCOSE SERPL-MCNC: 110 MG/DL (ref 65–99)
HCT VFR BLD AUTO: 35.3 % (ref 34–46.6)
HGB BLD-MCNC: 11.3 G/DL (ref 11.1–15.9)
IMM GRANULOCYTES # BLD: 0 X10E3/UL (ref 0–0.1)
IMM GRANULOCYTES NFR BLD: 0 %
LYMPHOCYTES # BLD AUTO: 0.8 X10E3/UL (ref 0.7–3.1)
LYMPHOCYTES NFR BLD AUTO: 19 %
MCH RBC QN AUTO: 25.5 PG (ref 26.6–33)
MCHC RBC AUTO-ENTMCNC: 32 G/DL (ref 31.5–35.7)
MCV RBC AUTO: 80 FL (ref 79–97)
MONOCYTES # BLD AUTO: 0.3 X10E3/UL (ref 0.1–0.9)
MONOCYTES NFR BLD AUTO: 7 %
NEUTROPHILS # BLD AUTO: 3 X10E3/UL (ref 1.4–7)
NEUTROPHILS NFR BLD AUTO: 71 %
PLATELET # BLD AUTO: 176 X10E3/UL (ref 150–379)
POTASSIUM SERPL-SCNC: 4.1 MMOL/L (ref 3.5–5.2)
PROT SERPL-MCNC: 6.6 G/DL (ref 6–8.5)
RBC # BLD AUTO: 4.44 X10E6/UL (ref 3.77–5.28)
SODIUM SERPL-SCNC: 138 MMOL/L (ref 134–144)
WBC # BLD AUTO: 4.3 X10E3/UL (ref 3.4–10.8)

## 2018-08-28 PROCEDURE — 77387 GUIDANCE FOR RADJ TX DLVR: CPT

## 2018-08-28 PROCEDURE — 77412 RADIATION TX DELIVERY LVL 3: CPT

## 2018-08-29 ENCOUNTER — HOSPITAL ENCOUNTER (OUTPATIENT)
Dept: RADIATION THERAPY | Age: 58
Discharge: HOME | End: 2018-08-29
Payer: COMMERCIAL

## 2018-08-29 PROCEDURE — 77412 RADIATION TX DELIVERY LVL 3: CPT

## 2018-08-29 PROCEDURE — 77387 GUIDANCE FOR RADJ TX DLVR: CPT

## 2018-08-30 ENCOUNTER — HOSPITAL ENCOUNTER (OUTPATIENT)
Dept: RADIATION THERAPY | Age: 58
Discharge: HOME | End: 2018-08-30
Payer: COMMERCIAL

## 2018-08-30 PROCEDURE — 77307 TELETHX ISODOSE PLAN CPLX: CPT

## 2018-08-30 PROCEDURE — 77334 RADIATION TREATMENT AID(S): CPT

## 2018-08-30 PROCEDURE — 77387 GUIDANCE FOR RADJ TX DLVR: CPT

## 2018-08-30 PROCEDURE — 77336 RADIATION PHYSICS CONSULT: CPT

## 2018-08-30 PROCEDURE — 77412 RADIATION TX DELIVERY LVL 3: CPT

## 2018-08-31 ENCOUNTER — HOSPITAL ENCOUNTER (OUTPATIENT)
Dept: RADIATION THERAPY | Age: 58
Discharge: HOME | End: 2018-08-31
Payer: COMMERCIAL

## 2018-09-04 ENCOUNTER — HOSPITAL ENCOUNTER (OUTPATIENT)
Dept: RADIATION THERAPY | Age: 58
Discharge: HOME | End: 2018-09-04
Payer: COMMERCIAL

## 2018-09-04 PROCEDURE — 77412 RADIATION TX DELIVERY LVL 3: CPT

## 2018-09-04 PROCEDURE — 77417 THER RADIOLOGY PORT IMAGE(S): CPT

## 2018-09-05 ENCOUNTER — OFFICE VISIT (OUTPATIENT)
Dept: ONCOLOGY | Age: 58
End: 2018-09-05

## 2018-09-05 ENCOUNTER — HOSPITAL ENCOUNTER (OUTPATIENT)
Dept: RADIATION THERAPY | Age: 58
Discharge: HOME | End: 2018-09-05
Payer: COMMERCIAL

## 2018-09-05 VITALS
TEMPERATURE: 97.8 F | BODY MASS INDEX: 33.18 KG/M2 | HEART RATE: 75 BPM | DIASTOLIC BLOOD PRESSURE: 82 MMHG | RESPIRATION RATE: 20 BRPM | SYSTOLIC BLOOD PRESSURE: 119 MMHG | WEIGHT: 211.4 LBS | OXYGEN SATURATION: 96 % | HEIGHT: 67 IN

## 2018-09-05 DIAGNOSIS — E66.9 CLASS 1 OBESITY WITH SERIOUS COMORBIDITY AND BODY MASS INDEX (BMI) OF 33.0 TO 33.9 IN ADULT, UNSPECIFIED OBESITY TYPE: ICD-10-CM

## 2018-09-05 DIAGNOSIS — Z51.11 CHEMOTHERAPY MANAGEMENT, ENCOUNTER FOR: ICD-10-CM

## 2018-09-05 DIAGNOSIS — C20 RECTAL CANCER METASTASIZED TO INTRAPELVIC LYMPH NODE (HCC): Primary | ICD-10-CM

## 2018-09-05 DIAGNOSIS — C77.5 RECTAL CANCER METASTASIZED TO INTRAPELVIC LYMPH NODE (HCC): Primary | ICD-10-CM

## 2018-09-05 PROCEDURE — 77412 RADIATION TX DELIVERY LVL 3: CPT

## 2018-09-05 RX ORDER — CEPHALEXIN 500 MG/1
500 CAPSULE ORAL 4 TIMES DAILY
COMMUNITY
End: 2018-09-18 | Stop reason: ALTCHOICE

## 2018-09-05 NOTE — PROGRESS NOTES
[facility] at [address] [place] [address] [address], [place]: [phone]  F: [phone]    Reason for Visit:   Philip Fowler is a 63 y.o. female who is seen in follow up for Rectal cancer. Treatment History:   · 6/29/18-colonoscopy was notable for a bleeding mass of malignant appearance in the mid rectum and distal rectum at a distance between 4 cm to 11 cm from the anus. Caused partial obstruction. · 7/6/18-CT chest abdomen pelvis showed rectal thickening, some perirectal stranding and several subcentimeter perirectal lymph nodes  · 7/11/18: Rectal ultrasound showed a partially circumferential mass seen in the distal rectum extending through the muscularis propria, 2 small lymph nodes about 6 mm in size adjacent to the mass. · 8/7/18- Xeloda with Radiation    History of Present Illness:   Patient is a 63 y.o. female who had not had a screening colonoscopy developed constipation and subsequently BRBPR x 5-6 months. She wondered if this was secondary to L thyroxine. However symptoms continued to progress and she was referred to GI for further evaluation. Further investigations as above. She has since met with Dr. Sam Rhodes- Colorectal surgery and GI Dr. Usman Ziegler. She is feeling well today. Today was day 17 of 28 of XRT with concurrent Xeloda. Reports she has not missed any pills since the last follow up. She will make up for these days and will get a total of 28 treatments. She denies nausea or vomiting. She has been having normal bowel movements without bleeding and minimal pain. She has a hemorrhoid  that sometimes causes a pain. She does report skin changes due to XRT. Currently using Sitz bath and PRN neosporin per Dr. Chrissie Salcedo. Denies mouth sores, fevers, or skin rashes. She has 1 adopted daughter and supportive    Quit smoking in 1992  Rare ETOH    No FH of cancer.      Labs every 2 weeks    Past Medical History:   Diagnosis Date    Thyroid disease     hyperthyroidism      Past Surgical History:   Procedure Laterality Date    HX GI      cholecystectomy    HX HEENT      sinus sgy    HX ORTHOPAEDIC      carpel tunnel      Social History   Substance Use Topics    Smoking status: Never Smoker    Smokeless tobacco: Never Used    Alcohol use No      Family History   Problem Relation Age of Onset    Heart Disease Mother     Heart Disease Father      Current Outpatient Prescriptions   Medication Sig    cephALEXin (KEFLEX) 500 mg capsule Take 500 mg by mouth four (4) times daily.  OTHER [employer] methIMAzole (TAPAZOLE) 5 mg tablet Take 5 mg by mouth.  loratadine (CLARITIN) 10 mg tablet Take 10 mg by mouth.  polyethylene glycol (MIRALAX) 17 gram packet Take 17 g by mouth daily.  Cholecalciferol, Vitamin D3, 3,000 unit tab Take  by mouth. No current facility-administered medications for this visit. No Known Allergies     Review of Systems: A complete review of systems was obtained, negative except as described above. Physical Exam:     Visit Vitals    /82    Pulse 75    Temp 97.8 °F (36.6 °C)    Resp 20    Ht 5' 7\" (1.702 m)    Wt 211 lb 6.4 oz (95.9 kg)    SpO2 96%    BMI 33.11 kg/m2     ECOG PS: 0  General: No distress  Eyes: PERRL, anicteric sclerae  HENT: Atraumatic, OP clear  Neck: Supple  Lymphatic: No cervical, supraclavicular adenopathy  Respiratory: CTAB, normal respiratory effort  CV: Normal rate, regular rhythm, no murmurs, no peripheral edema  GI: Soft, nontender, nondistended, no masses, no hepatomegaly, no splenomegaly  MS: Normal gait and station. Digits without clubbing or cyanosis. Skin: No rashes, ecchymoses, or petechiae. Normal temperature, turgor, and texture.   Psych: Alert, oriented, appropriate affect, normal judgment/insight    Results:     Lab Results   Component Value Date/Time    WBC 4.3 08/27/2018 03:52 PM    HGB 11.3 08/27/2018 03:52 PM    HCT 35.3 08/27/2018 03:52 PM    PLATELET 367 72/79/5205 03:52 PM    MCV 80 08/27/2018 03:52 PM    ABS. NEUTROPHILS 3.0 08/27/2018 03:52 PM     Lab Results   Component Value Date/Time    Sodium 138 08/27/2018 03:52 PM    Potassium 4.1 08/27/2018 03:52 PM    Chloride 100 08/27/2018 03:52 PM    CO2 24 08/27/2018 03:52 PM    Glucose 110 (H) 08/27/2018 03:52 PM    BUN 9 08/27/2018 03:52 PM    Creatinine 0.73 08/27/2018 03:52 PM    GFR est  08/27/2018 03:52 PM    GFR est non-AA 92 08/27/2018 03:52 PM    Calcium 8.5 (L) 08/27/2018 03:52 PM     Lab Results   Component Value Date/Time    Bilirubin, total 0.2 08/27/2018 03:52 PM    ALT (SGPT) 13 08/27/2018 03:52 PM    AST (SGOT) 16 08/27/2018 03:52 PM    Alk. phosphatase 91 08/27/2018 03:52 PM    Protein, total 6.6 08/27/2018 03:52 PM    Albumin 3.8 08/27/2018 03:52 PM     Records reviewed and summarized above. Pathology report(s) reviewed above. Radiology report(s) reviewed above. 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    All available pathology, imaging, lab results and progress notes were reviewed. Consistent with clinical stage III adenocarcinoma of the mid and distal third of rectum. We discussed management of rectal cancer with curative intent. Reviewed the rationale behind neoadjuvant chemotherapy with concurrent radiation- goals being decreasing the rate of local recurrence and helping with sphincter preservation    Reviewed the results of the [facility] Rectal cancer study that compared preop Chemo XRT followed by surgery to surgery alone    At 46 months median follow-up, preoperative chemoradiotherapy was associated with a significantly lower pelvic relapse rate (6 versus 13 percent with postoperative therapy).  The five-year disease-free survival (DFS; 68 versus 65 percent) and overall survival rates (76 versus 74 percent) were similar for preoperative and postoperative therapy, respectively; 10-year rates were also comparable (DFS approximately 68 percent in both groups, overall survival approximately 60 percent in both groups). Discussed the regimen in detail ( 825 mg/m2 twice daily, five days per week)    Is currently tolerating 1500 mg of Xeloda twice daily with radiation that she started on 8/7/18. CBC was reviewed from 8/13/18    We also discussed post op adjuvant chemotherapy for 4 months     2) Hypothyroidism  Controlled    3) GIB  Resolved, most recent Hgb 11.3 and ferritin 23  Continue to monitor CBC every 2 weeks  Secondary to 1   Will arrange for IV iron if indicated      Plan:     · Continue Xeloda 1500 mg twice daily with Radiation- today she is day 17 of 28. · Follow with   · Follow with colorectal surgery 6 weeks after completion of XRT and radiation    See me every 2 weeks with cbc, cmp while on XRT and Xeloda    I appreciate the opportunity to participate in Ms. Sebastián Galeano's care.     Seen in conjunction with Nitin Kan NP    Signed By: Michael Walsh MD

## 2018-09-05 NOTE — MR AVS SNAPSHOT
2700 [facility] 209 1400 [address] [place] [address] 
[phone] Patient: Jovani Valero MRN: [id number] ROMARIO:[birth year] Visit Information Date & Time Provider Department Dept. Phone Encounter #  
 9/5/2018 11:45 AM 1610 [facility] at [address] [id number] Follow-up Instructions Return in about 2 weeks (around 9/19/2018) for shahrzad/georgi fu 2 wk rectal ca. Your Appointments 9/18/2018  2:30 PM  
Follow Up with Juan Carlos Rodriguez MD  
[facility] HUANG Oncology at [facility] MARIFER) Appt Note: follow up [facility] [address]  
[phone]  
  
   
 [address] Sudheer Jones [facility] [address] Upcoming Health Maintenance Date Due Hepatitis C Screening 1960 Pneumococcal 19-64 Highest Risk (1 of 3 - PCV13) 4/20/1980 DTaP/Tdap/Td series (1 - Tdap) 4/20/1982 PAP AKA CERVICAL CYTOLOGY 4/20/1982 BREAST CANCER SCRN MAMMOGRAM 4/20/2011 FOBT Q 1 YEAR AGE 50-75 4/20/2011 Influenza Age 5 to Adult 8/1/2018 Allergies as of 9/5/2018  Review Complete On: 9/5/2018 By: Deann Schwarz No Known Allergies Current Immunizations  Never Reviewed No immunizations on file. Not reviewed this visit You Were Diagnosed With   
  
 Codes Comments Rectal cancer metastasized to intrapelvic lymph node ([facility] [address]    -  Primary ICD-10-CM: C20, C77.5 ICD-9-CM: 154.1, 196.6 Class 1 obesity with serious comorbidity and body mass index (BMI) of 33.0 to 33.9 in adult, unspecified obesity type     ICD-10-CM: E66.9, Z68.33 
ICD-9-CM: 278.00, V85.33 Chemotherapy management, encounter for     ICD-10-CM: Z51.11 ICD-9-CM: V58.11 Vitals BP Pulse Temp Resp Height(growth percentile) Weight(growth percentile) 119/82 75 97.8 °F (36.6 °C) 20 5' 7\" (1.702 m) 211 lb 6.4 oz (95.9 kg) SpO2 BMI OB Status Smoking Status 96% 33.11 kg/m2 Postmenopausal Never Smoker Vitals History BMI and BSA Data Body Mass Index Body Surface Area  
 33.11 kg/m 2 2.13 m 2 Preferred Pharmacy Pharmacy Name Phone [facility]KTOR[facility] Valenzuela IsmaelLaurence Jensen, [address] [facility] [phone] Your Updated Medication List  
  
   
This list is accurate as of 9/5/18 12:56 PM.  Always use your most recent med list.  
  
  
  
  
 cephALEXin 500 mg capsule Commonly known as:  Eileen Mutters Take 500 mg by mouth four (4) times daily. Cholecalciferol (Vitamin D3) 3,000 unit Tab Take  by mouth. CLARITIN 10 mg tablet Generic drug:  loratadine Take 10 mg by mouth. methIMAzole 5 mg tablet Commonly known as:  TAPAZOLE Take 5 mg by mouth. MIRALAX 17 gram packet Generic drug:  polyethylene glycol Take 17 g by mouth daily. OTHER It Works [employer] Follow-up Instructions Return in about 2 weeks (around 9/19/2018) for shahrzad/georgi lima 2 wk rectal ca. To-Do List   
 09/05/2018  4:15 PM  
  Appointment with RAD ONC THERAPY [facility] at [address] [place] [address] ([phone])  
  
 09/06/2018 4:15 PM  
  Appointment with RAD ONC THERAPY [facility] at [address] [place] [address] ([phone])  
  
 09/07/2018 4:15 PM  
  Appointment with RAD ONC THERAPY [facility] at [address] [place] [address] ([phone])  
  
 09/10/2018 4:15 PM  
  Appointment with RAD ONC THERAPY [facility] at [address] [place] [address] ([phone])  
  
 09/11/2018 4:15 PM  
  Appointment with [facility] at [address] [place] [address] ([phone])  
  
 09/12/2018 4:15 PM  
  Appointment with RAD ONC THERAPY [facility] at [address] [place] [address] ([phone])  
  
 09/13/2018 4:15 PM  
  Appointment with RAD ONC THERAPY [facility] at [address] [place] [address] ([phone]) [facility]! Dear Shae Guzman: 
Thank you for requesting a [employer] account.   Our records indicate that you already have an active [facility] account. You can access your account anytime at [email or web address] Did you know that you can access your hospital and ER discharge instructions at any time in [facility]? You can also review all of your test results from your hospital stay or ER visit. Additional Information If you have questions, please visit the Frequently Asked Questions section of the [facility] website at [email or web address] Remember, [facility] is NOT to be used for urgent needs. For medical emergencies, dial 911. Now available from your iPhone and Android! Please provide this summary of care documentation to your next provider. Your primary care clinician is listed as PROVIDER UNKNOWN. If you have any questions after today's visit, please call [phone].

## 2018-09-07 ENCOUNTER — HOSPITAL ENCOUNTER (OUTPATIENT)
Dept: RADIATION THERAPY | Age: 58
Discharge: HOME | End: 2018-09-07
Payer: COMMERCIAL

## 2018-09-07 PROCEDURE — 77412 RADIATION TX DELIVERY LVL 3: CPT

## 2018-09-10 ENCOUNTER — HOSPITAL ENCOUNTER (OUTPATIENT)
Dept: RADIATION THERAPY | Age: 58
Discharge: HOME | End: 2018-09-10
Payer: COMMERCIAL

## 2018-09-10 PROCEDURE — 77412 RADIATION TX DELIVERY LVL 3: CPT

## 2018-09-10 PROCEDURE — 77336 RADIATION PHYSICS CONSULT: CPT

## 2018-09-10 PROCEDURE — 77417 THER RADIOLOGY PORT IMAGE(S): CPT

## 2018-09-11 ENCOUNTER — HOSPITAL ENCOUNTER (OUTPATIENT)
Dept: RADIATION THERAPY | Age: 58
Discharge: HOME | End: 2018-09-11
Payer: COMMERCIAL

## 2018-09-11 PROCEDURE — 77412 RADIATION TX DELIVERY LVL 3: CPT

## 2018-09-12 ENCOUNTER — HOSPITAL ENCOUNTER (OUTPATIENT)
Dept: RADIATION THERAPY | Age: 58
Discharge: HOME | End: 2018-09-12
Payer: COMMERCIAL

## 2018-09-12 PROCEDURE — 77412 RADIATION TX DELIVERY LVL 3: CPT

## 2018-09-13 ENCOUNTER — HOSPITAL ENCOUNTER (OUTPATIENT)
Dept: RADIATION THERAPY | Age: 58
Discharge: HOME | End: 2018-09-13
Payer: COMMERCIAL

## 2018-09-13 PROCEDURE — 77412 RADIATION TX DELIVERY LVL 3: CPT

## 2018-09-14 ENCOUNTER — HOSPITAL ENCOUNTER (OUTPATIENT)
Dept: RADIATION THERAPY | Age: 58
Discharge: HOME | End: 2018-09-14
Payer: COMMERCIAL

## 2018-09-14 PROCEDURE — 77412 RADIATION TX DELIVERY LVL 3: CPT

## 2018-09-17 ENCOUNTER — HOSPITAL ENCOUNTER (OUTPATIENT)
Dept: RADIATION THERAPY | Age: 58
Discharge: HOME | End: 2018-09-17
Payer: COMMERCIAL

## 2018-09-17 PROCEDURE — 77280 THER RAD SIMULAJ FIELD SMPL: CPT

## 2018-09-17 PROCEDURE — 77412 RADIATION TX DELIVERY LVL 3: CPT

## 2018-09-17 PROCEDURE — 77336 RADIATION PHYSICS CONSULT: CPT

## 2018-09-18 ENCOUNTER — OFFICE VISIT (OUTPATIENT)
Dept: ONCOLOGY | Age: 58
End: 2018-09-18

## 2018-09-18 ENCOUNTER — HOSPITAL ENCOUNTER (OUTPATIENT)
Dept: RADIATION THERAPY | Age: 58
Discharge: HOME | End: 2018-09-18
Payer: COMMERCIAL

## 2018-09-18 VITALS
OXYGEN SATURATION: 96 % | HEART RATE: 76 BPM | TEMPERATURE: 97.9 F | WEIGHT: 213.2 LBS | BODY MASS INDEX: 33.46 KG/M2 | HEIGHT: 67 IN | SYSTOLIC BLOOD PRESSURE: 125 MMHG | RESPIRATION RATE: 20 BRPM | DIASTOLIC BLOOD PRESSURE: 80 MMHG

## 2018-09-18 DIAGNOSIS — C77.5 RECTAL CANCER METASTASIZED TO INTRAPELVIC LYMPH NODE (HCC): Primary | ICD-10-CM

## 2018-09-18 DIAGNOSIS — C20 RECTAL CANCER METASTASIZED TO INTRAPELVIC LYMPH NODE (HCC): Primary | ICD-10-CM

## 2018-09-18 DIAGNOSIS — E66.9 CLASS 1 OBESITY WITH SERIOUS COMORBIDITY AND BODY MASS INDEX (BMI) OF 33.0 TO 33.9 IN ADULT, UNSPECIFIED OBESITY TYPE: ICD-10-CM

## 2018-09-18 PROCEDURE — 77412 RADIATION TX DELIVERY LVL 3: CPT

## 2018-09-18 RX ORDER — TERCONAZOLE 4 MG/G
CREAM VAGINAL
Refills: 0 | COMMUNITY
Start: 2018-08-30

## 2018-09-18 NOTE — PROGRESS NOTES
[facility] [place] at [place] [address] [address], [place]: [phone]  F: [phone]    Reason for Visit:   Boni Lang is a 63 y.o. female who is seen in follow up for Rectal cancer. Treatment History:   · 6/29/18-colonoscopy was notable for a bleeding mass of malignant appearance in the mid rectum and distal rectum at a distance between 4 cm to 11 cm from the anus. Caused partial obstruction. · 7/6/18-CT chest abdomen pelvis showed rectal thickening, some perirectal stranding and several subcentimeter perirectal lymph nodes  · 7/11/18: Rectal ultrasound showed a partially circumferential mass seen in the distal rectum extending through the muscularis propria, 2 small lymph nodes about 6 mm in size adjacent to the mass. · 8/7/18~ 9/20/18-  Xeloda with Radiation    History of Present Illness:   Patient is a 63 y.o. female who had not had a screening colonoscopy developed constipation and subsequently BRBPR x 5-6 months. She wondered if this was secondary to L thyroxine. However symptoms continued to progress and she was referred to GI for further evaluation. Further investigations as above. She has since met with Dr. Mario Vargas- Colorectal surgery       She is feeling well today. Today was day 26/ 28 of XRT with concurrent Xeloda. She is well. She has been fatigued though. She has had mostly normal BM, however the perineum is raw- sitz baths help. No bleeding. She denies nausea or vomiting. Denies mouth sores, fevers, or skin rashes. She has 1 adopted daughter and supportive    Quit smoking in 1992  Rare ETOH    No FH of cancer.      Labs every 2 weeks    Past Medical History:   Diagnosis Date    Thyroid disease     hyperthyroidism      Past Surgical History:   Procedure Laterality Date    HX GI      cholecystectomy    HX HEENT      sinus sgy    HX ORTHOPAEDIC      carpel tunnel      Social History   Substance Use Topics    Smoking status: Never Smoker    Smokeless tobacco: Never Used    Alcohol use No      Family History   Problem Relation Age of Onset    Heart Disease Mother     Heart Disease Father      Current Outpatient Prescriptions   Medication Sig    terconazole (TERAZOL 7) 0.4 % vaginal cream INSERT 1 APPLICATORFUL IN VAGINA EVERY EVENING FOR 7 DAYS    OTHER It Works [employer]    methIMAzole (TAPAZOLE) 5 mg tablet Take 5 mg by mouth.  loratadine (CLARITIN) 10 mg tablet Take 10 mg by mouth.  polyethylene glycol (MIRALAX) 17 gram packet Take 17 g by mouth daily.  Cholecalciferol, Vitamin D3, 3,000 unit tab Take  by mouth. No current facility-administered medications for this visit. No Known Allergies     Review of Systems: A complete review of systems was obtained, negative except as described above. Physical Exam:     Visit Vitals    /80    Pulse 76    Temp 97.9 °F (36.6 °C)    Resp 20    Ht 5' 7\" (1.702 m)    Wt 213 lb 3.2 oz (96.7 kg)    SpO2 96%    BMI 33.39 kg/m2     ECOG PS: 0  General: No distress  Eyes: PERRL, anicteric sclerae  HENT: Atraumatic, OP clear  Neck: Supple  Lymphatic: No cervical, supraclavicular adenopathy  Respiratory: CTAB, normal respiratory effort  CV: Normal rate, regular rhythm, no murmurs, no peripheral edema  GI: Soft, nontender, nondistended, no masses, no hepatomegaly, no splenomegaly  MS: Normal gait and station. Digits without clubbing or cyanosis. Skin: No rashes, ecchymoses, or petechiae. Normal temperature, turgor, and texture. Psych: Alert, oriented, appropriate affect, normal judgment/insight    Results:     Lab Results   Component Value Date/Time    WBC 4.3 08/27/2018 03:52 PM    HGB 11.3 08/27/2018 03:52 PM    HCT 35.3 08/27/2018 03:52 PM    PLATELET 587 24/39/3332 03:52 PM    MCV 80 08/27/2018 03:52 PM    ABS.  NEUTROPHILS 3.0 08/27/2018 03:52 PM     Lab Results   Component Value Date/Time    Sodium 138 08/27/2018 03:52 PM    Potassium 4.1 08/27/2018 03:52 PM    Chloride 100 08/27/2018 03:52 PM    CO2 24 08/27/2018 03:52 PM    Glucose 110 (H) 08/27/2018 03:52 PM    BUN 9 08/27/2018 03:52 PM    Creatinine 0.73 08/27/2018 03:52 PM    GFR est  08/27/2018 03:52 PM    GFR est non-AA 92 08/27/2018 03:52 PM    Calcium 8.5 (L) 08/27/2018 03:52 PM     Lab Results   Component Value Date/Time    Bilirubin, total 0.2 08/27/2018 03:52 PM    ALT (SGPT) 13 08/27/2018 03:52 PM    AST (SGOT) 16 08/27/2018 03:52 PM    Alk. phosphatase 91 08/27/2018 03:52 PM    Protein, total 6.6 08/27/2018 03:52 PM    Albumin 3.8 08/27/2018 03:52 PM     Records reviewed and summarized above. Pathology report(s) reviewed above. Radiology report(s) reviewed above. 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 CBC was reviewed from 8/27/18    Seeing Dr. Flakita Tavarez in 6 weeks for a local exam and should have imaging prior to surgery    We also discussed post op adjuvant chemotherapy for 4 months with XELOX    2) Hypothyroidism  Controlled    3) GIB  Resolved, most recent Hgb 11.3 and ferritin 23        Plan:     · Continue Xeloda 1500 mg twice daily with Radiation- today she is day 26 of 28. · Follow with   · Follow with colorectal surgery 6 weeks after completion of XRT and radiation    See me 3-4 weeks after surgery- she will call for this appointment    I appreciate the opportunity to participate in Ms. Zaida Galeano's care.     Seen in conjunction with Jovanna Light NP    Signed By: Ramos Duarte MD

## 2018-09-19 ENCOUNTER — HOSPITAL ENCOUNTER (OUTPATIENT)
Dept: RADIATION THERAPY | Age: 58
Discharge: HOME | End: 2018-09-19
Payer: COMMERCIAL

## 2018-09-19 PROCEDURE — 77336 RADIATION PHYSICS CONSULT: CPT

## 2018-09-19 PROCEDURE — 77412 RADIATION TX DELIVERY LVL 3: CPT

## 2018-09-20 ENCOUNTER — HOSPITAL ENCOUNTER (OUTPATIENT)
Dept: RADIATION THERAPY | Age: 58
Discharge: HOME | End: 2018-09-20
Payer: COMMERCIAL

## 2018-09-20 PROCEDURE — 77412 RADIATION TX DELIVERY LVL 3: CPT

## 2018-10-29 ENCOUNTER — DOCUMENTATION ONLY (OUTPATIENT)
Dept: ONCOLOGY | Age: 58
End: 2018-10-29

## 2018-10-29 NOTE — PROGRESS NOTES
Fax recd from patient with request to refill xeloda as has met deductible for the year. Message to provider for review. Note dated 9/18/18  state patient to have post op XELOX for 4 months. [facility]. My Chart message to patient that request to be reviewed by provider.

## 2018-11-30 ENCOUNTER — OFFICE VISIT (OUTPATIENT)
Dept: FAMILY MEDICINE CLINIC | Age: 58
End: 2018-11-30

## 2018-11-30 VITALS
RESPIRATION RATE: 18 BRPM | SYSTOLIC BLOOD PRESSURE: 125 MMHG | HEART RATE: 83 BPM | TEMPERATURE: 98.8 F | WEIGHT: 219.5 LBS | BODY MASS INDEX: 34.45 KG/M2 | HEIGHT: 67 IN | OXYGEN SATURATION: 96 % | DIASTOLIC BLOOD PRESSURE: 86 MMHG

## 2018-11-30 DIAGNOSIS — Z20.818 EXPOSURE TO STREP THROAT: ICD-10-CM

## 2018-11-30 DIAGNOSIS — R05.9 COUGH: Primary | ICD-10-CM

## 2018-11-30 DIAGNOSIS — J02.9 SORE THROAT: ICD-10-CM

## 2018-11-30 LAB
S PYO AG THROAT QL: NEGATIVE
VALID INTERNAL CONTROL?: YES

## 2018-11-30 RX ORDER — BENZONATATE 100 MG/1
100 CAPSULE ORAL
Qty: 30 CAP | Refills: 0 | Status: ON HOLD | OUTPATIENT
Start: 2018-11-30 | End: 2019-04-01

## 2018-11-30 RX ORDER — AMOXICILLIN 500 MG/1
500 TABLET, FILM COATED ORAL 2 TIMES DAILY
Qty: 20 TAB | Refills: 0 | Status: SHIPPED | OUTPATIENT
Start: 2018-11-30 | End: 2018-12-10

## 2018-11-30 NOTE — PATIENT INSTRUCTIONS
Cough: Care Instructions  Your Care Instructions    A cough is your body's response to something that bothers your throat or airways. Many things can cause a cough. You might cough because of a cold or the flu, bronchitis, or asthma. Smoking, postnasal drip, allergies, and stomach acid that backs up into your throat also can cause coughs. A cough is a symptom, not a disease. Most coughs stop when the cause, such as a cold, goes away. You can take a few steps at home to cough less and feel better. Follow-up care is a key part of your treatment and safety. Be sure to make and go to all appointments, and call your doctor if you are having problems. It's also a good idea to know your test results and keep a list of the medicines you take. How can you care for yourself at home? · Drink lots of water and other fluids. This helps thin the mucus and soothes a dry or sore throat. Honey or lemon juice in hot water or tea may ease a dry cough. · Take cough medicine as directed by your doctor. · Prop up your head on pillows to help you breathe and ease a dry cough. · Try cough drops to soothe a dry or sore throat. Cough drops don't stop a cough. Medicine-flavored cough drops are no better than candy-flavored drops or hard candy. · Do not smoke. Avoid secondhand smoke. If you need help quitting, talk to your doctor about stop-smoking programs and medicines. These can increase your chances of quitting for good. When should you call for help? Call 911 anytime you think you may need emergency care.  For example, call if:    · You have severe trouble breathing.    Call your doctor now or seek immediate medical care if:    · You cough up blood.     · You have new or worse trouble breathing.     · You have a new or higher fever.     · You have a new rash.    Watch closely for changes in your health, and be sure to contact your doctor if:    · You cough more deeply or more often, especially if you notice more mucus or a change in the color of your mucus.     · You have new symptoms, such as a sore throat, an earache, or sinus pain.     · You do not get better as expected. Where can you learn more? Go to http://bridget-jose.info/. Enter D279 in the search box to learn more about \"Cough: Care Instructions. \"  Current as of: December 6, 2017  Content Version: 11.8  © 2276-4349 [employer]. Care instructions adapted under license by [employer] (which disclaims liability or warranty for this information). If you have questions about a medical condition or this instruction, always ask your healthcare professional. [place] [address] any warranty or liability for your use of this information. Strep Throat: Care Instructions  Your Care Instructions    Strep throat is a bacterial infection that causes sudden, severe sore throat and fever. Strep throat, which is caused by bacteria called streptococcus, is treated with antibiotics. Sometimes a strep test is necessary to tell if the sore throat is caused by strep bacteria. Treatment can help ease symptoms and may prevent future problems. Follow-up care is a key part of your treatment and safety. Be sure to make and go to all appointments, and call your doctor if you are having problems. It's also a good idea to know your test results and keep a list of the medicines you take. How can you care for yourself at home? · Take your antibiotics as directed. Do not stop taking them just because you feel better. You need to take the full course of antibiotics. · Strep throat can spread to others until 24 hours after you begin taking antibiotics. During this time, you should avoid contact with other people at work or home, especially infants and children. Do not sneeze or cough on others, and wash your hands often.  Keep your drinking glass and eating utensils separate from those of others, and wash these items well in hot, soapy water.  · Gargle with warm salt water at least once each hour to help reduce swelling and make your throat feel better. Use 1 teaspoon of salt mixed in 8 fluid ounces of warm water. · Take an over-the-counter pain medication, such as acetaminophen (Tylenol), ibuprofen (Advil, Motrin), or naproxen (Aleve). Read and follow all instructions on the label. · Try an over-the-counter anesthetic throat spray or throat lozenges, which may help relieve throat pain. · Drink plenty of fluids. Fluids may help soothe an irritated throat. Hot fluids, such as tea or soup, may help your throat feel better. · Eat soft solids and drink plenty of clear liquids. Flavored ice pops, ice cream, scrambled eggs, sherbet, and gelatin dessert (such as Jell-O) may also soothe the throat. · Get lots of rest.  · Do not smoke, and avoid secondhand smoke. If you need help quitting, talk to your doctor about stop-smoking programs and medicines. These can increase your chances of quitting for good. · Use a vaporizer or humidifier to add moisture to the air in your bedroom. Follow the directions for cleaning the machine. When should you call for help? Call your doctor now or seek immediate medical care if:    · You have a new or higher fever.     · You have a fever with a stiff neck or severe headache.     · You have new or worse trouble swallowing.     · Your sore throat gets much worse on one side.     · Your pain becomes much worse on one side of your throat.    Watch closely for changes in your health, and be sure to contact your doctor if:    · You are not getting better after 2 days (48 hours).     · You do not get better as expected. Where can you learn more? Go to http://bridget-jose.info/. Enter K625 in the search box to learn more about \"Strep Throat: Care Instructions. \"  Current as of: March 28, 2018  Content Version: 11.8  © 6447-4881 [employer], [employer].  Care instructions adapted under license by [employer] (which disclaims liability or warranty for this information). If you have questions about a medical condition or this instruction, always ask your healthcare professional. [address] any warranty or liability for your use of this information.

## 2018-11-30 NOTE — PROGRESS NOTES
HPI       Ganga Garcia is a 63 y.o. female who presents for cough. Patient says her cough started on Wednesday 11/28 but got worst yesterday. The cough is productive of yellowish sputum. She also is having a sore throat that started this morning. She denies fever, chills, rash, nausea, vomiting, muscle aches. She has not taken any medicine for the cough. Her daughter is sick and was diagnosed with Strep  No recent travel      PMHx-reviewed:  Past Medical History:   Diagnosis Date    Thyroid disease     hyperthyroidism     Meds-reviewed:   Current Outpatient Medications   Medication Sig Dispense Refill    benzonatate (TESSALON) 100 mg capsule Take 1 Cap by mouth three (3) times daily as needed for Cough. 30 Cap 0    amoxicillin 500 mg tab Take 500 mg by mouth two (2) times a day for 10 days. 20 Tab 0    terconazole (TERAZOL 7) 0.4 % vaginal cream INSERT 1 APPLICATORFUL IN VAGINA EVERY EVENING FOR 7 DAYS  0    methIMAzole (TAPAZOLE) 5 mg tablet Take 5 mg by mouth.  loratadine (CLARITIN) 10 mg tablet Take 10 mg by mouth.  polyethylene glycol (MIRALAX) 17 gram packet Take 17 g by mouth daily.  OTHER It Works [employer] Cholecalciferol, Vitamin D3, 3,000 unit tab Take  by mouth. Allergies-reviewed:   No Known Allergies    Smoker-reviewed:  Social History     Tobacco Use   Smoking Status Never Smoker   Smokeless Tobacco Never Used     ETOH-reviewed:   Social History     Substance and Sexual Activity   Alcohol Use No     FH-reviewed:   Family History   Problem Relation Age of Onset    Heart Disease Mother     Heart Disease Father      ROS:  Review of Systems   Constitutional: Negative. HENT: Positive for sore throat. Respiratory: Positive for cough. Negative for chest tightness, shortness of breath and wheezing. Cardiovascular: Negative. Gastrointestinal: Negative. Skin: Negative.       Physical Exam:  Visit Vitals  /86 (BP 1 Location: Right arm, BP Patient Position: Sitting)   Pulse 83   Temp 98.8 °F (37.1 °C) (Oral)   Resp 18   Ht 5' 7\" (1.702 m)   Wt 219 lb 8 oz (99.6 kg)   SpO2 96%   BMI 34.38 kg/m²       Wt Readings from Last 3 Encounters:   11/30/18 219 lb 8 oz (99.6 kg)   09/18/18 213 lb 3.2 oz (96.7 kg)   09/05/18 211 lb 6.4 oz (95.9 kg)     BP Readings from Last 3 Encounters:   11/30/18 125/86   09/18/18 125/80   09/05/18 119/82      Physical Exam   Constitutional: She appears well-developed and well-nourished. No distress. HENT:   Right Ear: External ear normal.   Left Ear: External ear normal.   Mouth/Throat: Oropharynx is clear and moist. No oropharyngeal exudate. Cardiovascular: Normal rate, regular rhythm and normal heart sounds. Pulmonary/Chest: Effort normal and breath sounds normal. No respiratory distress. She has no wheezes. She has no rales. She exhibits no tenderness. Abdominal: Soft. Bowel sounds are normal. There is no tenderness. Skin: Skin is warm and dry. No rash noted. I personally reviewed the following lab results:   Recent Results (from the past 24 hour(s))   AMB POC RAPID STREP A    Collection Time: 11/30/18  3:21 PM   Result Value Ref Range    VALID INTERNAL CONTROL POC Yes     Group A Strep Ag Negative Negative              Assessment     63 y.o. female with:    ICD-10-CM ICD-9-CM    1. Cough R05 786.2 benzonatate (TESSALON) 100 mg capsule   2. Sore throat J02.9 462 AMB POC RAPID STREP A      amoxicillin 500 mg tab   3. Exposure to strep throat Z20.818 V01.89 AMB POC RAPID STREP A      amoxicillin 500 mg tab              Plan       Orders Placed This Encounter    AMB POC RAPID STREP A    benzonatate (TESSALON) 100 mg capsule    amoxicillin 500 mg tab     1- Cough: PE unremarkable. No other symptoms present. Tessalon pearls given to help with cough. She can use OTC robitussin or mucinex.  Tylenol or motrin prn if she develops any fever  2- Sore throat with exposure to strep: no exudates seen on PE and rapid strep neg but due to exposure to strep will treat empirically with Amoxicillin     Patient discussed with Dr. Ama Cast    I have discussed the diagnosis with the patient and the intended plan as seen in the above orders. The patient has received an after-visit summary and questions were answered concerning future plans. I have discussed medication side effects and warnings with the patient as well.     Phyllis Pablo MD  Family Medicine Resident

## 2018-12-03 ENCOUNTER — HOSPITAL ENCOUNTER (OUTPATIENT)
Dept: PREADMISSION TESTING | Age: 58
Discharge: HOME | End: 2018-12-03
Payer: COMMERCIAL

## 2018-12-03 VITALS
BODY MASS INDEX: 34.03 KG/M2 | SYSTOLIC BLOOD PRESSURE: 149 MMHG | HEIGHT: 67 IN | WEIGHT: 216.8 LBS | HEART RATE: 88 BPM | RESPIRATION RATE: 18 BRPM | TEMPERATURE: 98.4 F | DIASTOLIC BLOOD PRESSURE: 89 MMHG

## 2018-12-03 LAB
ABO + RH BLD: NORMAL
ALBUMIN SERPL-MCNC: 3.4 G/DL (ref 3.5–5)
ALBUMIN/GLOB SERPL: 1 {RATIO} (ref 1.1–2.2)
ALP SERPL-CCNC: 96 U/L (ref 45–117)
ALT SERPL-CCNC: 33 U/L (ref 12–78)
ANION GAP SERPL CALC-SCNC: 6 MMOL/L (ref 5–15)
APTT PPP: 26.6 SEC (ref 22.1–32)
AST SERPL-CCNC: 20 U/L (ref 15–37)
ATRIAL RATE: 81 BPM
BILIRUB SERPL-MCNC: 0.3 MG/DL (ref 0.2–1)
BLOOD GROUP ANTIBODIES SERPL: NORMAL
BUN SERPL-MCNC: 11 MG/DL (ref 6–20)
BUN/CREAT SERPL: 11 (ref 12–20)
CALCIUM SERPL-MCNC: 8.2 MG/DL (ref 8.5–10.1)
CALCULATED P AXIS, ECG09: 25 DEGREES
CALCULATED R AXIS, ECG10: 42 DEGREES
CALCULATED T AXIS, ECG11: 38 DEGREES
CHLORIDE SERPL-SCNC: 105 MMOL/L (ref 97–108)
CO2 SERPL-SCNC: 28 MMOL/L (ref 21–32)
CREAT SERPL-MCNC: 0.97 MG/DL (ref 0.55–1.02)
DIAGNOSIS, 93000: NORMAL
ERYTHROCYTE [DISTWIDTH] IN BLOOD BY AUTOMATED COUNT: 15 % (ref 11.5–14.5)
GLOBULIN SER CALC-MCNC: 3.5 G/DL (ref 2–4)
GLUCOSE SERPL-MCNC: 110 MG/DL (ref 65–100)
HCT VFR BLD AUTO: 38.5 % (ref 35–47)
HGB BLD-MCNC: 11.8 G/DL (ref 11.5–16)
INR PPP: 1 (ref 0.9–1.1)
MAGNESIUM SERPL-MCNC: 2 MG/DL (ref 1.6–2.4)
MCH RBC QN AUTO: 27.2 PG (ref 26–34)
MCHC RBC AUTO-ENTMCNC: 30.6 G/DL (ref 30–36.5)
MCV RBC AUTO: 88.7 FL (ref 80–99)
NRBC # BLD: 0 K/UL (ref 0–0.01)
NRBC BLD-RTO: 0 PER 100 WBC
P-R INTERVAL, ECG05: 140 MS
PHOSPHATE SERPL-MCNC: 3.4 MG/DL (ref 2.6–4.7)
PLATELET # BLD AUTO: 179 K/UL (ref 150–400)
PMV BLD AUTO: 11.4 FL (ref 8.9–12.9)
POTASSIUM SERPL-SCNC: 3.8 MMOL/L (ref 3.5–5.1)
PROT SERPL-MCNC: 6.9 G/DL (ref 6.4–8.2)
PROTHROMBIN TIME: 9.7 SEC (ref 9–11.1)
Q-T INTERVAL, ECG07: 386 MS
QRS DURATION, ECG06: 76 MS
QTC CALCULATION (BEZET), ECG08: 448 MS
RBC # BLD AUTO: 4.34 M/UL (ref 3.8–5.2)
SODIUM SERPL-SCNC: 139 MMOL/L (ref 136–145)
SPECIMEN EXP DATE BLD: NORMAL
THERAPEUTIC RANGE,PTTT: NORMAL SECS (ref 58–77)
VENTRICULAR RATE, ECG03: 81 BPM
WBC # BLD AUTO: 3.6 K/UL (ref 3.6–11)

## 2018-12-03 PROCEDURE — 80053 COMPREHEN METABOLIC PANEL: CPT

## 2018-12-03 PROCEDURE — 85027 COMPLETE CBC AUTOMATED: CPT

## 2018-12-03 PROCEDURE — 84100 ASSAY OF PHOSPHORUS: CPT

## 2018-12-03 PROCEDURE — 85610 PROTHROMBIN TIME: CPT

## 2018-12-03 PROCEDURE — 86901 BLOOD TYPING SEROLOGIC RH(D): CPT

## 2018-12-03 PROCEDURE — 85730 THROMBOPLASTIN TIME PARTIAL: CPT

## 2018-12-03 PROCEDURE — 93005 ELECTROCARDIOGRAM TRACING: CPT

## 2018-12-03 PROCEDURE — 83735 ASSAY OF MAGNESIUM: CPT

## 2018-12-03 PROCEDURE — 36415 COLL VENOUS BLD VENIPUNCTURE: CPT

## 2018-12-03 NOTE — PERIOP NOTES
DO NOT EAT ANYTHING AFTER MIDNIGHT, except as instructed THE NIGHT BEFORE SURGERY. PT GIVEN OPPORTUNITY TO ASK ADDITIONAL QUESTIONS. Patient given CHG wipes and instruction sheet, instructions for use reviewed with patient. Patient given surgical site infection information FAQs handout and hand hygiene tips sheet. Pre-operative instructions reviewed and patient verbalizes understanding of instructions. Patient has been given the opportunity to ask additional questions. Pt given incentive spirometer and instructed in use. Pt gave return demonstration. ERAS booklet reviewed, pages 7-11. Pt voiced understanding. Ronak in the stoma / wound office notified of patient needing markings dos.

## 2018-12-12 ENCOUNTER — ANESTHESIA EVENT (OUTPATIENT)
Dept: SURGERY | Age: 58
DRG: 331 | End: 2018-12-12
Payer: COMMERCIAL

## 2018-12-13 ENCOUNTER — ANESTHESIA (OUTPATIENT)
Dept: SURGERY | Age: 58
DRG: 331 | End: 2018-12-13
Payer: COMMERCIAL

## 2018-12-13 ENCOUNTER — HOSPITAL ENCOUNTER (INPATIENT)
Age: 58
LOS: 5 days | Discharge: HOME HEALTH | DRG: 331 | End: 2018-12-18
Attending: COLON & RECTAL SURGERY | Admitting: COLON & RECTAL SURGERY
Payer: COMMERCIAL

## 2018-12-13 DIAGNOSIS — G89.18 ACUTE POST-OPERATIVE PAIN: Primary | ICD-10-CM

## 2018-12-13 PROBLEM — E66.9 OBESITY (BMI 30.0-34.9): Chronic | Status: ACTIVE | Noted: 2018-12-13

## 2018-12-13 PROBLEM — E05.90 HYPERTHYROIDISM: Chronic | Status: ACTIVE | Noted: 2018-12-13

## 2018-12-13 PROBLEM — C20 RECTAL CANCER METASTASIZED TO INTRAPELVIC LYMPH NODE (HCC): Chronic | Status: ACTIVE | Noted: 2018-07-17

## 2018-12-13 PROBLEM — C77.5 RECTAL CANCER METASTASIZED TO INTRAPELVIC LYMPH NODE (HCC): Chronic | Status: ACTIVE | Noted: 2018-07-17

## 2018-12-13 PROBLEM — C20 RECTAL CANCER (HCC): Status: ACTIVE | Noted: 2018-12-13

## 2018-12-13 LAB — CEA SERPL-MCNC: 0.5 NG/ML

## 2018-12-13 PROCEDURE — 77030002996 HC SUT SLK J&J -A: Performed by: COLON & RECTAL SURGERY

## 2018-12-13 PROCEDURE — 77030034626 HC LIGASURE SM JAW SEAL OPN SURG COVD -E: Performed by: COLON & RECTAL SURGERY

## 2018-12-13 PROCEDURE — C1765 ADHESION BARRIER: HCPCS | Performed by: COLON & RECTAL SURGERY

## 2018-12-13 PROCEDURE — 77030010514 HC APPL CLP LIG COVD -B: Performed by: COLON & RECTAL SURGERY

## 2018-12-13 PROCEDURE — 77030016151 HC PROTCTR LNS DFOG COVD -B: Performed by: COLON & RECTAL SURGERY

## 2018-12-13 PROCEDURE — 74011000250 HC RX REV CODE- 250: Performed by: COLON & RECTAL SURGERY

## 2018-12-13 PROCEDURE — 74011250636 HC RX REV CODE- 250/636

## 2018-12-13 PROCEDURE — 77030019927 HC TBNG IRR CYSTO BAXT -A: Performed by: COLON & RECTAL SURGERY

## 2018-12-13 PROCEDURE — 77030010011 HC RNG RETRCTR STAY COOP -B: Performed by: COLON & RECTAL SURGERY

## 2018-12-13 PROCEDURE — 88331 PATH CONSLTJ SURG 1 BLK 1SPC: CPT

## 2018-12-13 PROCEDURE — 80048 BASIC METABOLIC PNL TOTAL CA: CPT

## 2018-12-13 PROCEDURE — 76010000166 HC OR TIME 11.5 TO 12 HR INTENSV-TIER 1: Performed by: COLON & RECTAL SURGERY

## 2018-12-13 PROCEDURE — 77030022474 HC RELD STPLR ENDO GIA COVD -C: Performed by: COLON & RECTAL SURGERY

## 2018-12-13 PROCEDURE — 77030005546 HC CATH URETH FOL 3W BARD -A: Performed by: COLON & RECTAL SURGERY

## 2018-12-13 PROCEDURE — 76210000016 HC OR PH I REC 1 TO 1.5 HR: Performed by: COLON & RECTAL SURGERY

## 2018-12-13 PROCEDURE — 77030008771 HC TU NG SALEM SUMP -A: Performed by: ANESTHESIOLOGY

## 2018-12-13 PROCEDURE — 77030037032 HC INSRT SCIS CLICKLLINE DISP STOR -B: Performed by: COLON & RECTAL SURGERY

## 2018-12-13 PROCEDURE — 77030035045 HC TRCR ENDOSC VRSPRT BLDLSS COVD -B: Performed by: COLON & RECTAL SURGERY

## 2018-12-13 PROCEDURE — 77030011640 HC PAD GRND REM COVD -A: Performed by: COLON & RECTAL SURGERY

## 2018-12-13 PROCEDURE — 77030038157 HC DEV PWR CNTR DISP SIGNIA COVD -C: Performed by: COLON & RECTAL SURGERY

## 2018-12-13 PROCEDURE — 76060000055 HC ANESTHESIA 12 TO 12.5 HR: Performed by: COLON & RECTAL SURGERY

## 2018-12-13 PROCEDURE — 77030025625 HC DEV TISS SEAL J&J -F: Performed by: COLON & RECTAL SURGERY

## 2018-12-13 PROCEDURE — 83735 ASSAY OF MAGNESIUM: CPT

## 2018-12-13 PROCEDURE — 77030037366 HC STPLR ENDO TRI-STPLR COVD -C: Performed by: COLON & RECTAL SURGERY

## 2018-12-13 PROCEDURE — 77030013567 HC DRN WND RESERV BARD -A: Performed by: COLON & RECTAL SURGERY

## 2018-12-13 PROCEDURE — 77030018846 HC SOL IRR STRL H20 ICUM -A: Performed by: COLON & RECTAL SURGERY

## 2018-12-13 PROCEDURE — 77030002986 HC SUT PROL J&J -A: Performed by: COLON & RECTAL SURGERY

## 2018-12-13 PROCEDURE — 0TJB8ZZ INSPECTION OF BLADDER, VIA NATURAL OR ARTIFICIAL OPENING ENDOSCOPIC: ICD-10-PCS | Performed by: UROLOGY

## 2018-12-13 PROCEDURE — 36415 COLL VENOUS BLD VENIPUNCTURE: CPT

## 2018-12-13 PROCEDURE — 77030018836 HC SOL IRR NACL ICUM -A: Performed by: COLON & RECTAL SURGERY

## 2018-12-13 PROCEDURE — 77030021052 HC RNG RETRCTR STAY COOP -A: Performed by: COLON & RECTAL SURGERY

## 2018-12-13 PROCEDURE — 0DTP0ZZ RESECTION OF RECTUM, OPEN APPROACH: ICD-10-PCS | Performed by: COLON & RECTAL SURGERY

## 2018-12-13 PROCEDURE — 88305 TISSUE EXAM BY PATHOLOGIST: CPT

## 2018-12-13 PROCEDURE — 77030009965 HC RELD STPLR ENDOS COVD -D: Performed by: COLON & RECTAL SURGERY

## 2018-12-13 PROCEDURE — 77030034850: Performed by: COLON & RECTAL SURGERY

## 2018-12-13 PROCEDURE — P9045 ALBUMIN (HUMAN), 5%, 250 ML: HCPCS

## 2018-12-13 PROCEDURE — 77030032490 HC SLV COMPR SCD KNE COVD -B: Performed by: COLON & RECTAL SURGERY

## 2018-12-13 PROCEDURE — 77030008477 HC STYL SATN SLP COVD -A: Performed by: ANESTHESIOLOGY

## 2018-12-13 PROCEDURE — 74011000250 HC RX REV CODE- 250

## 2018-12-13 PROCEDURE — 77030002916 HC SUT ETHLN J&J -A: Performed by: COLON & RECTAL SURGERY

## 2018-12-13 PROCEDURE — 82378 CARCINOEMBRYONIC ANTIGEN: CPT

## 2018-12-13 PROCEDURE — 77030002966 HC SUT PDS J&J -A: Performed by: COLON & RECTAL SURGERY

## 2018-12-13 PROCEDURE — 77030008684 HC TU ET CUF COVD -B: Performed by: ANESTHESIOLOGY

## 2018-12-13 PROCEDURE — 77030018832 HC SOL IRR H20 ICUM -A: Performed by: COLON & RECTAL SURGERY

## 2018-12-13 PROCEDURE — 77030008756 HC TU IRR SUC STRY -B: Performed by: COLON & RECTAL SURGERY

## 2018-12-13 PROCEDURE — 77030031139 HC SUT VCRL2 J&J -A: Performed by: COLON & RECTAL SURGERY

## 2018-12-13 PROCEDURE — 0DSL0ZZ REPOSITION TRANSVERSE COLON, OPEN APPROACH: ICD-10-PCS | Performed by: COLON & RECTAL SURGERY

## 2018-12-13 PROCEDURE — 77030002933 HC SUT MCRYL J&J -A: Performed by: COLON & RECTAL SURGERY

## 2018-12-13 PROCEDURE — 77030010541: Performed by: COLON & RECTAL SURGERY

## 2018-12-13 PROCEDURE — 77030035051: Performed by: COLON & RECTAL SURGERY

## 2018-12-13 PROCEDURE — 77030020747 HC TU INSUF ENDOSC TELE -A: Performed by: COLON & RECTAL SURGERY

## 2018-12-13 PROCEDURE — 85025 COMPLETE CBC W/AUTO DIFF WBC: CPT

## 2018-12-13 PROCEDURE — 88309 TISSUE EXAM BY PATHOLOGIST: CPT

## 2018-12-13 PROCEDURE — 74011250636 HC RX REV CODE- 250/636: Performed by: COLON & RECTAL SURGERY

## 2018-12-13 PROCEDURE — 77030019702 HC WRP THER MENM -C: Performed by: COLON & RECTAL SURGERY

## 2018-12-13 PROCEDURE — C1758 CATHETER, URETERAL: HCPCS | Performed by: COLON & RECTAL SURGERY

## 2018-12-13 PROCEDURE — 65660000000 HC RM CCU STEPDOWN

## 2018-12-13 PROCEDURE — 65270000029 HC RM PRIVATE

## 2018-12-13 PROCEDURE — 77030009527 HC GEL PRT SYS AMR -E: Performed by: COLON & RECTAL SURGERY

## 2018-12-13 PROCEDURE — 77030035048 HC TRCR ENDOSC OPTCL COVD -B: Performed by: COLON & RECTAL SURGERY

## 2018-12-13 PROCEDURE — 77030025303 HC STPLR ENDOSC J&J -G: Performed by: COLON & RECTAL SURGERY

## 2018-12-13 PROCEDURE — 77030012406 HC DRN WND PENRS BARD -A: Performed by: COLON & RECTAL SURGERY

## 2018-12-13 PROCEDURE — 0D1B0Z4 BYPASS ILEUM TO CUTANEOUS, OPEN APPROACH: ICD-10-PCS | Performed by: COLON & RECTAL SURGERY

## 2018-12-13 PROCEDURE — 74011000258 HC RX REV CODE- 258: Performed by: COLON & RECTAL SURGERY

## 2018-12-13 PROCEDURE — 0DBN0ZZ EXCISION OF SIGMOID COLON, OPEN APPROACH: ICD-10-PCS | Performed by: COLON & RECTAL SURGERY

## 2018-12-13 PROCEDURE — 77030010286 HC STPLR ENDOSC COVD -D: Performed by: COLON & RECTAL SURGERY

## 2018-12-13 PROCEDURE — 77030013079 HC BLNKT BAIR HGGR 3M -A: Performed by: ANESTHESIOLOGY

## 2018-12-13 PROCEDURE — 77030008467 HC STPLR SKN COVD -B: Performed by: COLON & RECTAL SURGERY

## 2018-12-13 PROCEDURE — 74011250637 HC RX REV CODE- 250/637: Performed by: COLON & RECTAL SURGERY

## 2018-12-13 PROCEDURE — 77030010545: Performed by: COLON & RECTAL SURGERY

## 2018-12-13 PROCEDURE — 84100 ASSAY OF PHOSPHORUS: CPT

## 2018-12-13 RX ORDER — SODIUM CHLORIDE 9 MG/ML
1000 INJECTION, SOLUTION INTRAVENOUS CONTINUOUS
Status: DISCONTINUED | OUTPATIENT
Start: 2018-12-14 | End: 2018-12-14 | Stop reason: HOSPADM

## 2018-12-13 RX ORDER — LIDOCAINE HYDROCHLORIDE 20 MG/ML
INJECTION, SOLUTION EPIDURAL; INFILTRATION; INTRACAUDAL; PERINEURAL
Status: DISCONTINUED | OUTPATIENT
Start: 2018-12-13 | End: 2018-12-13 | Stop reason: HOSPADM

## 2018-12-13 RX ORDER — SODIUM CHLORIDE 0.9 % (FLUSH) 0.9 %
5-10 SYRINGE (ML) INJECTION EVERY 8 HOURS
Status: DISCONTINUED | OUTPATIENT
Start: 2018-12-13 | End: 2018-12-14

## 2018-12-13 RX ORDER — SUCCINYLCHOLINE CHLORIDE 20 MG/ML
INJECTION INTRAMUSCULAR; INTRAVENOUS AS NEEDED
Status: DISCONTINUED | OUTPATIENT
Start: 2018-12-13 | End: 2018-12-13 | Stop reason: HOSPADM

## 2018-12-13 RX ORDER — ROCURONIUM BROMIDE 10 MG/ML
INJECTION, SOLUTION INTRAVENOUS AS NEEDED
Status: DISCONTINUED | OUTPATIENT
Start: 2018-12-13 | End: 2018-12-13 | Stop reason: HOSPADM

## 2018-12-13 RX ORDER — GABAPENTIN 300 MG/1
600 CAPSULE ORAL ONCE
Status: COMPLETED | OUTPATIENT
Start: 2018-12-13 | End: 2018-12-13

## 2018-12-13 RX ORDER — ESMOLOL HYDROCHLORIDE 10 MG/ML
INJECTION INTRAVENOUS AS NEEDED
Status: DISCONTINUED | OUTPATIENT
Start: 2018-12-13 | End: 2018-12-13 | Stop reason: HOSPADM

## 2018-12-13 RX ORDER — ACETAMINOPHEN 10 MG/ML
INJECTION, SOLUTION INTRAVENOUS AS NEEDED
Status: DISCONTINUED | OUTPATIENT
Start: 2018-12-13 | End: 2018-12-13 | Stop reason: HOSPADM

## 2018-12-13 RX ORDER — SODIUM CHLORIDE, SODIUM LACTATE, POTASSIUM CHLORIDE, CALCIUM CHLORIDE 600; 310; 30; 20 MG/100ML; MG/100ML; MG/100ML; MG/100ML
75 INJECTION, SOLUTION INTRAVENOUS CONTINUOUS
Status: DISCONTINUED | OUTPATIENT
Start: 2018-12-13 | End: 2018-12-13 | Stop reason: HOSPADM

## 2018-12-13 RX ORDER — ONDANSETRON 2 MG/ML
INJECTION INTRAMUSCULAR; INTRAVENOUS AS NEEDED
Status: DISCONTINUED | OUTPATIENT
Start: 2018-12-13 | End: 2018-12-13 | Stop reason: HOSPADM

## 2018-12-13 RX ORDER — PROPOFOL 10 MG/ML
INJECTION, EMULSION INTRAVENOUS AS NEEDED
Status: DISCONTINUED | OUTPATIENT
Start: 2018-12-13 | End: 2018-12-13 | Stop reason: HOSPADM

## 2018-12-13 RX ORDER — SODIUM CHLORIDE 0.9 % (FLUSH) 0.9 %
5-10 SYRINGE (ML) INJECTION AS NEEDED
Status: DISCONTINUED | OUTPATIENT
Start: 2018-12-13 | End: 2018-12-18 | Stop reason: HOSPADM

## 2018-12-13 RX ORDER — ALBUMIN HUMAN 50 G/1000ML
SOLUTION INTRAVENOUS AS NEEDED
Status: DISCONTINUED | OUTPATIENT
Start: 2018-12-13 | End: 2018-12-13 | Stop reason: HOSPADM

## 2018-12-13 RX ORDER — ACETAMINOPHEN 500 MG
1000 TABLET ORAL ONCE
Status: COMPLETED | OUTPATIENT
Start: 2018-12-13 | End: 2018-12-13

## 2018-12-13 RX ORDER — LIDOCAINE HYDROCHLORIDE ANHYDROUS AND DEXTROSE MONOHYDRATE .8; 5 G/100ML; G/100ML
1 INJECTION, SOLUTION INTRAVENOUS CONTINUOUS
Status: DISCONTINUED | OUTPATIENT
Start: 2018-12-14 | End: 2018-12-15

## 2018-12-13 RX ORDER — FENTANYL CITRATE 50 UG/ML
25 INJECTION, SOLUTION INTRAMUSCULAR; INTRAVENOUS
Status: DISCONTINUED | OUTPATIENT
Start: 2018-12-13 | End: 2018-12-14 | Stop reason: HOSPADM

## 2018-12-13 RX ORDER — ONDANSETRON 2 MG/ML
4 INJECTION INTRAMUSCULAR; INTRAVENOUS AS NEEDED
Status: DISCONTINUED | OUTPATIENT
Start: 2018-12-13 | End: 2018-12-14 | Stop reason: HOSPADM

## 2018-12-13 RX ORDER — LABETALOL HYDROCHLORIDE 5 MG/ML
INJECTION, SOLUTION INTRAVENOUS AS NEEDED
Status: DISCONTINUED | OUTPATIENT
Start: 2018-12-13 | End: 2018-12-13 | Stop reason: HOSPADM

## 2018-12-13 RX ORDER — SODIUM CHLORIDE, SODIUM LACTATE, POTASSIUM CHLORIDE, CALCIUM CHLORIDE 600; 310; 30; 20 MG/100ML; MG/100ML; MG/100ML; MG/100ML
75 INJECTION, SOLUTION INTRAVENOUS CONTINUOUS
Status: DISPENSED | OUTPATIENT
Start: 2018-12-14 | End: 2018-12-14

## 2018-12-13 RX ORDER — FENTANYL CITRATE 50 UG/ML
INJECTION, SOLUTION INTRAMUSCULAR; INTRAVENOUS AS NEEDED
Status: DISCONTINUED | OUTPATIENT
Start: 2018-12-13 | End: 2018-12-13 | Stop reason: HOSPADM

## 2018-12-13 RX ORDER — PHENYLEPHRINE HCL IN 0.9% NACL 0.4MG/10ML
SYRINGE (ML) INTRAVENOUS AS NEEDED
Status: DISCONTINUED | OUTPATIENT
Start: 2018-12-13 | End: 2018-12-13 | Stop reason: HOSPADM

## 2018-12-13 RX ORDER — SODIUM CHLORIDE, SODIUM LACTATE, POTASSIUM CHLORIDE, CALCIUM CHLORIDE 600; 310; 30; 20 MG/100ML; MG/100ML; MG/100ML; MG/100ML
INJECTION, SOLUTION INTRAVENOUS
Status: DISCONTINUED | OUTPATIENT
Start: 2018-12-13 | End: 2018-12-13 | Stop reason: HOSPADM

## 2018-12-13 RX ORDER — MAGNESIUM SULFATE HEPTAHYDRATE 40 MG/ML
INJECTION, SOLUTION INTRAVENOUS AS NEEDED
Status: DISCONTINUED | OUTPATIENT
Start: 2018-12-13 | End: 2018-12-13 | Stop reason: HOSPADM

## 2018-12-13 RX ORDER — CELECOXIB 200 MG/1
200 CAPSULE ORAL ONCE
Status: COMPLETED | OUTPATIENT
Start: 2018-12-13 | End: 2018-12-13

## 2018-12-13 RX ORDER — DEXAMETHASONE SODIUM PHOSPHATE 4 MG/ML
INJECTION, SOLUTION INTRA-ARTICULAR; INTRALESIONAL; INTRAMUSCULAR; INTRAVENOUS; SOFT TISSUE AS NEEDED
Status: DISCONTINUED | OUTPATIENT
Start: 2018-12-13 | End: 2018-12-13 | Stop reason: HOSPADM

## 2018-12-13 RX ORDER — SODIUM CHLORIDE 0.9 % (FLUSH) 0.9 %
5-10 SYRINGE (ML) INJECTION EVERY 8 HOURS
Status: DISCONTINUED | OUTPATIENT
Start: 2018-12-14 | End: 2018-12-18 | Stop reason: HOSPADM

## 2018-12-13 RX ORDER — ALVIMOPAN 12 MG/1
12 CAPSULE ORAL ONCE
Status: COMPLETED | OUTPATIENT
Start: 2018-12-13 | End: 2018-12-13

## 2018-12-13 RX ORDER — MIDAZOLAM HYDROCHLORIDE 1 MG/ML
INJECTION, SOLUTION INTRAMUSCULAR; INTRAVENOUS AS NEEDED
Status: DISCONTINUED | OUTPATIENT
Start: 2018-12-13 | End: 2018-12-13 | Stop reason: HOSPADM

## 2018-12-13 RX ORDER — SODIUM CHLORIDE 0.9 % (FLUSH) 0.9 %
5-10 SYRINGE (ML) INJECTION AS NEEDED
Status: DISCONTINUED | OUTPATIENT
Start: 2018-12-13 | End: 2018-12-14

## 2018-12-13 RX ORDER — BUPIVACAINE HYDROCHLORIDE AND EPINEPHRINE 5; 5 MG/ML; UG/ML
30 INJECTION, SOLUTION EPIDURAL; INTRACAUDAL; PERINEURAL ONCE
Status: COMPLETED | OUTPATIENT
Start: 2018-12-13 | End: 2018-12-13

## 2018-12-13 RX ORDER — KETAMINE HYDROCHLORIDE 10 MG/ML
INJECTION, SOLUTION INTRAMUSCULAR; INTRAVENOUS AS NEEDED
Status: DISCONTINUED | OUTPATIENT
Start: 2018-12-13 | End: 2018-12-13 | Stop reason: HOSPADM

## 2018-12-13 RX ORDER — NEOSTIGMINE METHYLSULFATE 1 MG/ML
INJECTION INTRAVENOUS AS NEEDED
Status: DISCONTINUED | OUTPATIENT
Start: 2018-12-13 | End: 2018-12-13 | Stop reason: HOSPADM

## 2018-12-13 RX ORDER — LIDOCAINE HYDROCHLORIDE 20 MG/ML
INJECTION, SOLUTION EPIDURAL; INFILTRATION; INTRACAUDAL; PERINEURAL AS NEEDED
Status: DISCONTINUED | OUTPATIENT
Start: 2018-12-13 | End: 2018-12-13 | Stop reason: HOSPADM

## 2018-12-13 RX ORDER — GLYCOPYRROLATE 0.2 MG/ML
INJECTION INTRAMUSCULAR; INTRAVENOUS AS NEEDED
Status: DISCONTINUED | OUTPATIENT
Start: 2018-12-13 | End: 2018-12-13 | Stop reason: HOSPADM

## 2018-12-13 RX ADMIN — ACETAMINOPHEN 1000 MG: 10 INJECTION, SOLUTION INTRAVENOUS at 16:33

## 2018-12-13 RX ADMIN — ALBUMIN HUMAN 12.5 G: 50 SOLUTION INTRAVENOUS at 16:14

## 2018-12-13 RX ADMIN — CELECOXIB 200 MG: 200 CAPSULE ORAL at 10:34

## 2018-12-13 RX ADMIN — ROCURONIUM BROMIDE 40 MG: 10 INJECTION, SOLUTION INTRAVENOUS at 11:51

## 2018-12-13 RX ADMIN — ROCURONIUM BROMIDE 10 MG: 10 INJECTION, SOLUTION INTRAVENOUS at 16:59

## 2018-12-13 RX ADMIN — FENTANYL CITRATE 50 MCG: 50 INJECTION, SOLUTION INTRAMUSCULAR; INTRAVENOUS at 11:42

## 2018-12-13 RX ADMIN — ESMOLOL HYDROCHLORIDE 10 MG: 10 INJECTION INTRAVENOUS at 15:54

## 2018-12-13 RX ADMIN — ROCURONIUM BROMIDE 10 MG: 10 INJECTION, SOLUTION INTRAVENOUS at 13:20

## 2018-12-13 RX ADMIN — ALVIMOPAN 12 MG: 12 CAPSULE ORAL at 10:33

## 2018-12-13 RX ADMIN — KETAMINE HYDROCHLORIDE 20 MG: 10 INJECTION, SOLUTION INTRAMUSCULAR; INTRAVENOUS at 11:58

## 2018-12-13 RX ADMIN — ROCURONIUM BROMIDE 20 MG: 10 INJECTION, SOLUTION INTRAVENOUS at 14:24

## 2018-12-13 RX ADMIN — SODIUM CHLORIDE, SODIUM LACTATE, POTASSIUM CHLORIDE, AND CALCIUM CHLORIDE: 600; 310; 30; 20 INJECTION, SOLUTION INTRAVENOUS at 17:52

## 2018-12-13 RX ADMIN — NEOSTIGMINE METHYLSULFATE 3.5 MG: 1 INJECTION INTRAVENOUS at 23:00

## 2018-12-13 RX ADMIN — SODIUM CHLORIDE, SODIUM LACTATE, POTASSIUM CHLORIDE, AND CALCIUM CHLORIDE 75 ML/HR: 600; 310; 30; 20 INJECTION, SOLUTION INTRAVENOUS at 11:22

## 2018-12-13 RX ADMIN — ONDANSETRON 4 MG: 2 INJECTION INTRAMUSCULAR; INTRAVENOUS at 22:50

## 2018-12-13 RX ADMIN — ROCURONIUM BROMIDE 10 MG: 10 INJECTION, SOLUTION INTRAVENOUS at 21:40

## 2018-12-13 RX ADMIN — SODIUM CHLORIDE, SODIUM LACTATE, POTASSIUM CHLORIDE, CALCIUM CHLORIDE: 600; 310; 30; 20 INJECTION, SOLUTION INTRAVENOUS at 15:43

## 2018-12-13 RX ADMIN — GABAPENTIN 600 MG: 300 CAPSULE ORAL at 10:33

## 2018-12-13 RX ADMIN — KETAMINE HYDROCHLORIDE 20 MG: 10 INJECTION, SOLUTION INTRAMUSCULAR; INTRAVENOUS at 11:50

## 2018-12-13 RX ADMIN — SUCCINYLCHOLINE CHLORIDE 120 MG: 20 INJECTION INTRAMUSCULAR; INTRAVENOUS at 11:42

## 2018-12-13 RX ADMIN — PROPOFOL 170 MG: 10 INJECTION, EMULSION INTRAVENOUS at 11:42

## 2018-12-13 RX ADMIN — ROCURONIUM BROMIDE 10 MG: 10 INJECTION, SOLUTION INTRAVENOUS at 15:20

## 2018-12-13 RX ADMIN — LIDOCAINE HYDROCHLORIDE 24.5 ML/HR: 20 INJECTION, SOLUTION EPIDURAL; INFILTRATION; INTRACAUDAL; PERINEURAL at 11:50

## 2018-12-13 RX ADMIN — ESMOLOL HYDROCHLORIDE 10 MG: 10 INJECTION INTRAVENOUS at 18:39

## 2018-12-13 RX ADMIN — ALBUMIN HUMAN 12.5 G: 50 SOLUTION INTRAVENOUS at 20:07

## 2018-12-13 RX ADMIN — MIDAZOLAM HYDROCHLORIDE 2 MG: 1 INJECTION, SOLUTION INTRAMUSCULAR; INTRAVENOUS at 11:32

## 2018-12-13 RX ADMIN — GLYCOPYRROLATE 0.5 MG: 0.2 INJECTION INTRAMUSCULAR; INTRAVENOUS at 23:00

## 2018-12-13 RX ADMIN — LABETALOL HYDROCHLORIDE 5 MG: 5 INJECTION, SOLUTION INTRAVENOUS at 21:21

## 2018-12-13 RX ADMIN — SODIUM CHLORIDE, SODIUM LACTATE, POTASSIUM CHLORIDE, CALCIUM CHLORIDE: 600; 310; 30; 20 INJECTION, SOLUTION INTRAVENOUS at 11:16

## 2018-12-13 RX ADMIN — ROCURONIUM BROMIDE 10 MG: 10 INJECTION, SOLUTION INTRAVENOUS at 18:30

## 2018-12-13 RX ADMIN — ALBUMIN HUMAN 12.5 G: 50 SOLUTION INTRAVENOUS at 11:50

## 2018-12-13 RX ADMIN — ROCURONIUM BROMIDE 20 MG: 10 INJECTION, SOLUTION INTRAVENOUS at 12:43

## 2018-12-13 RX ADMIN — DEXAMETHASONE SODIUM PHOSPHATE 8 MG: 4 INJECTION, SOLUTION INTRA-ARTICULAR; INTRALESIONAL; INTRAMUSCULAR; INTRAVENOUS; SOFT TISSUE at 12:08

## 2018-12-13 RX ADMIN — CEFOTETAN DISODIUM 2 G: 2 INJECTION, POWDER, FOR SOLUTION INTRAMUSCULAR; INTRAVENOUS at 12:05

## 2018-12-13 RX ADMIN — FENTANYL CITRATE 50 MCG: 50 INJECTION, SOLUTION INTRAMUSCULAR; INTRAVENOUS at 11:58

## 2018-12-13 RX ADMIN — MAGNESIUM SULFATE HEPTAHYDRATE 2 G: 40 INJECTION, SOLUTION INTRAVENOUS at 11:50

## 2018-12-13 RX ADMIN — ACETAMINOPHEN 1000 MG: 10 INJECTION, SOLUTION INTRAVENOUS at 22:38

## 2018-12-13 RX ADMIN — KETAMINE HYDROCHLORIDE 10 MG: 10 INJECTION, SOLUTION INTRAMUSCULAR; INTRAVENOUS at 11:53

## 2018-12-13 RX ADMIN — ACETAMINOPHEN 1000 MG: 500 TABLET ORAL at 10:33

## 2018-12-13 RX ADMIN — SODIUM CHLORIDE, SODIUM LACTATE, POTASSIUM CHLORIDE, AND CALCIUM CHLORIDE: 600; 310; 30; 20 INJECTION, SOLUTION INTRAVENOUS at 22:51

## 2018-12-13 RX ADMIN — ESMOLOL HYDROCHLORIDE 10 MG: 10 INJECTION INTRAVENOUS at 15:51

## 2018-12-13 RX ADMIN — ROCURONIUM BROMIDE 20 MG: 10 INJECTION, SOLUTION INTRAVENOUS at 16:01

## 2018-12-13 RX ADMIN — ROCURONIUM BROMIDE 10 MG: 10 INJECTION, SOLUTION INTRAVENOUS at 12:22

## 2018-12-13 RX ADMIN — ROCURONIUM BROMIDE 10 MG: 10 INJECTION, SOLUTION INTRAVENOUS at 20:02

## 2018-12-13 RX ADMIN — ROCURONIUM BROMIDE 20 MG: 10 INJECTION, SOLUTION INTRAVENOUS at 20:47

## 2018-12-13 RX ADMIN — ROCURONIUM BROMIDE 10 MG: 10 INJECTION, SOLUTION INTRAVENOUS at 17:55

## 2018-12-13 RX ADMIN — ROCURONIUM BROMIDE 10 MG: 10 INJECTION, SOLUTION INTRAVENOUS at 19:05

## 2018-12-13 RX ADMIN — Medication 80 MCG: at 13:24

## 2018-12-13 RX ADMIN — CEFOTETAN DISODIUM 2 G: 2 INJECTION, POWDER, FOR SOLUTION INTRAMUSCULAR; INTRAVENOUS at 18:05

## 2018-12-13 RX ADMIN — LIDOCAINE HYDROCHLORIDE 100 MG: 20 INJECTION, SOLUTION EPIDURAL; INFILTRATION; INTRACAUDAL; PERINEURAL at 11:42

## 2018-12-13 RX ADMIN — LABETALOL HYDROCHLORIDE 5 MG: 5 INJECTION, SOLUTION INTRAVENOUS at 16:01

## 2018-12-13 NOTE — WOUND CARE
Stoma Marking Note:     Type of ostomy scheduled: diverting loop ileostomy by Dr. Robyn Jarquin    Introduced self and services to patient. Patient able to verbalize knowledge of procedure consistent with consent. Stoma site marked with surgical marker in RUQ & LUQ. Stoma site chosen is in the patients visual field and within the rectus muscle while avoiding the following: umbilicus, wrinkles, dips, skin folds, rib cage, iliac crest and belt/pants/underwear line. Site was assessed in the lying, sitting and standing positions. Abdomen is soft with lower quads rolling under when sitting and out of sight. Patient understands that the final location will be determined by the surgeon and the site is only a guideline. She reports her desire to include her  in ostomy teaching for support. Plan to follow after surgery for teaching. Will likely need home health care for further teaching after discharge.     Kirk Anderson RN, BSN, [facility] [place]  Certified Wound, Ostomy, Continence Nurse  office: [phone]  pager [phone] [phone]

## 2018-12-13 NOTE — ANESTHESIA PREPROCEDURE EVALUATION
Anesthetic History   No history of anesthetic complications            Review of Systems / Medical History  Patient summary reviewed, nursing notes reviewed and pertinent labs reviewed    Pulmonary                Comments: Smoking Status: Former Smoker - 5 pack years  Quit Smokin92     Neuro/Psych   Within defined limits           Cardiovascular              Hyperlipidemia    Exercise tolerance: >4 METS     GI/Hepatic/Renal     GERD           Endo/Other      Hyperthyroidism  Obesity and cancer     Other Findings              Physical Exam    Airway  Mallampati: II  TM Distance: 4 - 6 cm  Neck ROM: normal range of motion   Mouth opening: Normal     Cardiovascular  Regular rate and rhythm,  S1 and S2 normal,  no murmur, click, rub, or gallop  Rhythm: regular  Rate: normal         Dental  No notable dental hx       Pulmonary  Breath sounds clear to auscultation               Abdominal  GI exam deferred       Other Findings            Anesthetic Plan    ASA: 2  Anesthesia type: general          Induction: Intravenous  Anesthetic plan and risks discussed with: Patient

## 2018-12-13 NOTE — PROGRESS NOTES
Clinical Care Lead Arrival Summary        Name: Alejandra Villarreal  MRN: [id number]  Date: 2018 2:09 PM  Admission Date: 2018  : 1960  Situation:  18 CYSTOSCOPY WITH PLACEMENT BILATERAL URETERAL CATHETERS, HAND ASSISTED LAPAROSCOPIC POSSIBLE OPEN LOW ANTERIOR RESECTION AND LOOP ILEOSTOMY, POSSIBLE ABDOMINOPERINEAL RESECTION (E R A S) by Dr. Rex Coleman    Background:  Medical History      Past Medical History Date Comments   Asthma [J45.909]  SEASONAL ALLERGY INDUCED   GERD (gastroesophageal reflux disease) [K21.9]     Rectal cancer ([facility] [address] [C20] 2018 Diagnosed via colonoscopy on 2018. Staged as DM5K8L6. Treated with neoadjuvant chemoradiation using Xeloda and 5040 cGy radiation and with the final radiation dose administered on 2018. Thyroid disease [E07.9]  hyperthyroidism      Surgical History      Past Surgical History Laterality Date Comments   HX GI [KBY824]   Laparoscopic cholecystectomy. HX HEENT [[id number]]   Sinus surgery. HX COLONOSCOPY [KWS402]  2018 Dr. Gianna Funes. HX ORTHOPAEDIC [id number] Right 1998 Right carpal tunnel release. HX ORTHOPAEDIC [[id number]]   Right ulnar nerve repositioning.       Social History      Category History   Smoking Status Former Smoker; Quit date: ; 0.5 packs/day for 10 years (5 pk-yrs)   Smokeless Tobacco Status Never Used   Alcohol Use No   Drug Use No   Sexually Active Not Asked   ADL Not Asked             The Beta blocker the patient is taking is [unfilled], none          STAND: IF INDICATED ON ADMISSION DATABASE  Voice quality/secretions:    Hx of dysphagia:    O2 sat:    Alertness:      Flu vaccination received for current season: SCREEN     VTE Documentation-BOTH CHEMICAL AND MECHANICAL  VTE Risk Assessment    Mechanical VTE orders:    Venous Foot Pump:    Graduated Compression Stockings:    Sequential Compression Devices:    Patient Refused VTE:        Diet: DIET NPO Except Meds Assessment:    Lines/Drains/Airways:   Peripheral IV 12/13/18 Right Hand (Active)       Peripheral IV 12/13/18 Left Hand (Active)       Airway - Endotracheal Tube 12/13/18 Oral (Active)       Urinary Catheter 12/13/18 2- way; Cee (Active)   Indications for Use Surgery 12/13/2018  1:00 PM   Status Draining 12/13/2018  1:00 PM   Site Condition No abnormalities 12/13/2018  1:00 PM   Discontinuation Reason Per nurse-driven protocol 03/11/3137  1:00 PM       Last 3 Weights:  Last 3 Recorded Weights in this Encounter    12/13/18 1021   Weight: 98 kg (216 lb)     DAILY STANDING WEIGHTS WHILE ON TELEMETRY    Recommendations:POST COLORECTAL ERAS PATHWAY  Consult Orders: )None  Recent orders:  WARMING BLANKET  VERIFY CONSENT HAS BEEN OBTAINED  VERIFY CONSENT HAS BEEN OBTAINED  VERIFY CONSENT HAS BEEN OBTAINED  WEIGH PATIENT  PREP AND SCRUB  CLIP SURGICAL SITE  UP AD ERIKA  AMBULATE WITH ASSISTANCE  NURSING-MISCELLANEOUS:  POC URINE PREGNANCY TEST  INTAKE AND OUTPUT  NURSING-MISCELLANEOUS:  APPLY/MAINTAIN SEQUENTIAL COMPRESSION DEVICE  APPLY/MAINTAIN GRADED COMPRESSION STOCKINGS  DIET NPO  INITIAL PHYSICIAN ORDER: INPATIENT  FULL CODE  CEE CATHETER, INSERTION  IP CONSULT TO WOUND CARE, OSTOMY  WARMING BLANKET  WEIGH PATIENT  NURSING-MISCELLANEOUS:  NOTIFY ANESTHESIA  SALINE LOCK IV  SALINE LOCK IV    Core Measures Compliant   CHF:NA   Pneumonia:NA   Vaccines:SCREEN   SCIP:YES   Cee:YES   AMI:NA

## 2018-12-13 NOTE — H&P
Colorectal Surgery Pre-Operative History and Physical Examination Note          NAME:  Abbey Or   :   1960   MRN:   [id number]     Operation Date: 2018    Chief Complaint:  Rectal cancer     History of Present Illness: The patient is a 56-year-old female who underwent a colonoscopy on 2018 to evaluate rectal bleeding and a change in bowel pattern. The procedure was performed by Mejia Dumont MD, and it revealed a distal rectal mass. The mass was biopsied, and the diagnosis of adenocarcinoma was confirmed. Pre-treatment staging included a CT scan of the chest, abdomen, and pelvis performed on 2018 and an endoscopic rectal ultrasound performed by Dr. Magda Montelongo on 2018. Based on these studies, the disease was staged as dU0T2G1 (stage III). The patient then received neoadjuvant therapy with Xeloda and 5040 cGy of external beam radiation. She received her final radiation dose on 2018, and examination in the office on 2018 revealed that the tumor had responded to the treatment but that it was still visible an palpable with a distal margin located posteriorly at 4 to 5 cm from the anal verge. She has continued to move her bowels up to 3 times per day, and she denies currently having any bleeding or experiencing any episodes of fecal incontinence. PMH:  Past Medical History:   Diagnosis Date    Asthma     SEASONAL ALLERGY INDUCED    GERD (gastroesophageal reflux disease)     Rectal cancer ([facility] [address] 2018    Diagnosed via colonoscopy on 2018. Staged as HR1G4I3. Treated with neoadjuvant chemoradiation using Xeloda and 5040 cGy radiation and with the final radiation dose administered on 2018.  Thyroid disease     hyperthyroidism       PSH:  Past Surgical History:   Procedure Laterality Date    HX COLONOSCOPY  2018    Dr. Magda Montelongo.  HX GI  2005    Laparoscopic cholecystectomy.  HX HEENT  2015    Sinus surgery.     HX ORTHOPAEDIC Right 1998    Right carpal tunnel release.  HX ORTHOPAEDIC  1982    Right ulnar nerve repositioning. Home Medications:  Cannot display prior to admission medications because the patient has not been admitted in this contact. Hospital Medications:  No current facility-administered medications for this encounter. Current Outpatient Medications   Medication Sig    benzonatate (TESSALON) 100 mg capsule Take 1 Cap by mouth three (3) times daily as needed for Cough.  terconazole (TERAZOL 7) 0.4 % vaginal cream INSERT 1 APPLICATORFUL IN VAGINA EVERY EVENING FOR 7 DAYS IF NEEDED FOR YEAST INFECTION    methIMAzole (TAPAZOLE) 5 mg tablet Take 5 mg by mouth nightly.  loratadine (CLARITIN) 10 mg tablet Take 10 mg by mouth daily as needed. Allergies:  No Known Allergies    Family History:  Family History   Problem Relation Age of Onset    Heart Disease Mother     Cancer Mother         CERVICAL    Hypertension Mother     Heart Disease Father     Hypertension Father        Social History:  Social History     Tobacco Use    Smoking status: Former Smoker     Packs/day: 0.50     Years: 10.00     Pack years: 5.00     Last attempt to quit:      Years since quittin.9    Smokeless tobacco: Never Used   Substance Use Topics    Alcohol use: No       Review of Systems:    Symptom Y/N Comments  Symptom Y/N Comments   Fever/Chills    Chest Pain     Cough    Abdominal Pain     Sputum    Joint Pain     SOB/MUNOZ    Pruritis/Rash     Nausea/vomit    Tolerating PT/OT     Diarrhea    Tolerating Diet     Constipation    Other       Could NOT obtain due to:        Objective:   No data found. No intake/output data recorded. No intake/output data recorded. PHYSICAL EXAMINATION:    GENERAL:  No distress. HEENT:  Anicteric. LUNGS:  Clear to auscultation bilaterally. HEART:  Regular rate and rhythm with no murmurs, gallops, or rubs. ABDOMEN:  Soft, non-tender, and non-distended.   There are no palpable masses or hernias. There is no appreciable organomegaly. EXTREMITIES:  No edema. NEURO:  Alert and oriented. Data Review      12/3/2018 15:37   WBC 3.6   HGB 11.8   HCT 38.5   PLATELET 571   INR 1.0   Prothrombin time 9.7   aPTT 26.6   Sodium 139   Potassium 3.8   Chloride 105   CO2 28   Glucose 110 (H)   BUN 11   Creatinine 0.97   Calcium 8.2 (L)   Phosphorus 3.4   Magnesium 2.0   GFR est non-AA 59 (L)   Bilirubin, total 0.3   Albumin 3.4 (L)   ALT (SGPT) 33   AST 20   Alk. phosphatase 96                       Assessment and Plan:      The patient has completed neoadjuvant chemoradiation therapy for her rectal cancer, and she appears to have recovered sufficiently. Resection of the residual disease is now indicated. Because of its low location within the rectum, obtaining an adequate distal margin will most likely require an ultralow anterior resection or an abdominoperineal resection. It has been explained to the patient that an intraoperative decision will be made between these two procedures and that while the former would include the creation of a temporary loop ileostomy the latter would require a permanent colostomy. The risks of surgery have been discussed in detail, including but not limited to bleeding (possibly requiring blood transfusion or return to the operating room), infection (wound, urinary tract, intraabdominal or pelvic, pulmonary), anastomotic leakage (even with the creation of a temporary ileostomy), anastomotic stricture, injury to other organs or structures including ureters, urinary bladder, vagina, small intestine, and spleen (possibly requiring splenectomy), small bowel obstruction, hernia formation, ostomy-related complications (necrosis, retraction, obstruction, prolapse, and parastomal hernia formation), functional problems, myocardial infarction, stroke, deep venous thrombosis, pulmonary embolism, and death.     It has been explained that although a hand-assisted laparoscopic operation has been planned it could become necessary to convert to the open operation. It has also been explained that a urologist will be consulted to perform the cystoscopic placement of temporary bilateral ureteral stents in order to facilitate intraoperative identification of the ureters. The patient appeared to have understand all of the above, and she agreed to proceed.         Principal Problem:    Rectal cancer metastasized to intrapelvic lymph node ([address] (7/17/2018)    Active Problems:    Hyperthyroidism (12/13/2018)      Obesity (BMI 30.0-34.9) (12/13/2018)

## 2018-12-13 NOTE — OP NOTES
[address] [place] [address] AURELIANO Delgado  MR#: [id number]  : 1960  ACCOUNT #: [de-identified]   DATE OF SERVICE: 2018    PREOPERATIVE DIAGNOSIS:  Rectal cancer. POSTOPERATIVE DIAGNOSIS:  Rectal cancer. PROCEDURE PERFORMED:  Included:  1. Cystoscopy. 2.  Placement of bilateral ureteral catheters. 3.  Placement of Acosta catheter. SURGEON:  Oliver Chavez md    ANESTHESIA:  General.    ESTIMATED BLOOD LOSS:  None. DRAINS:  None. IMPLANTS:  5-Romansh bilateral whistle tip catheters. INDICATION FOR PROCEDURE:  The patient is a 71-year-old  female who was scheduled to undergo a low anterior resection for rectal cancer. Urology was requested to place bilateral ureteral catheters. DESCRIPTION OF PROCEDURE IN DETAIL:  After the patient was identified and informed consent obtained, the potential risks and complications of the procedure were reviewed. The patient was brought back. General anesthesia was induced and she was placed in the dorsal lithotomy position and prepped and draped in standard sterile fashion for a cystoscopic procedure. Care was taken to ensure that all pressure points were adequately padded. The patient received antibacterial prophylaxis. Appropriate timeout was performed and a 21-Romansh cystoscope was introduced via the urethra into the bladder in a retrograde manner. Pancystoscopy of the bladder was performed. There was no evidence of papillary or sessile bladder tumors, angiomas or carcinoma in situ. Both ureteral orifices were in orthotopic position. There was clear efflux of urine bilaterally. The left ureteral orifice was identified and intubated with the blue colored whistle tip catheter. This passed easily into the mid and proximal ureter. In similar fashion, the red whistle tip catheter was placed into the right ureter. Again, this progressed easily. The cystoscope was removed.   A 16-Romansh Acosta catheter was placed. The stents were placed through the Y connector into the drainage tubing. Drainage bag was connected. Stents were affixed to the catheter and the patient reprepped and draped for scheduled colorectal surgery. IMPRESSION:  Rectal cancer. PLAN:  Ureteral catheters can stay as long as necessary per Dr. Spicer Or. We will be available if needed.       MD PAT Rivera / GARCÍA  D: 12/13/2018 12:16     T: 12/13/2018 16:18  JOB #: [id number]

## 2018-12-14 LAB
ANION GAP SERPL CALC-SCNC: 14 MMOL/L (ref 5–15)
ANION GAP SERPL CALC-SCNC: 8 MMOL/L (ref 5–15)
BASOPHILS # BLD: 0 K/UL (ref 0–0.1)
BASOPHILS NFR BLD: 0 % (ref 0–1)
BUN SERPL-MCNC: 11 MG/DL (ref 6–20)
BUN SERPL-MCNC: 12 MG/DL (ref 6–20)
BUN/CREAT SERPL: 10 (ref 12–20)
BUN/CREAT SERPL: 8 (ref 12–20)
CALCIUM SERPL-MCNC: 7.5 MG/DL (ref 8.5–10.1)
CALCIUM SERPL-MCNC: 7.7 MG/DL (ref 8.5–10.1)
CHLORIDE SERPL-SCNC: 104 MMOL/L (ref 97–108)
CHLORIDE SERPL-SCNC: 106 MMOL/L (ref 97–108)
CO2 SERPL-SCNC: 21 MMOL/L (ref 21–32)
CO2 SERPL-SCNC: 21 MMOL/L (ref 21–32)
CREAT SERPL-MCNC: 1.05 MG/DL (ref 0.55–1.02)
CREAT SERPL-MCNC: 1.47 MG/DL (ref 0.55–1.02)
DIFFERENTIAL METHOD BLD: ABNORMAL
EOSINOPHIL # BLD: 0 K/UL (ref 0–0.4)
EOSINOPHIL NFR BLD: 0 % (ref 0–7)
ERYTHROCYTE [DISTWIDTH] IN BLOOD BY AUTOMATED COUNT: 14.6 % (ref 11.5–14.5)
ERYTHROCYTE [DISTWIDTH] IN BLOOD BY AUTOMATED COUNT: 14.7 % (ref 11.5–14.5)
GLUCOSE SERPL-MCNC: 130 MG/DL (ref 65–100)
GLUCOSE SERPL-MCNC: 159 MG/DL (ref 65–100)
HCT VFR BLD AUTO: 37.5 % (ref 35–47)
HCT VFR BLD AUTO: 39.8 % (ref 35–47)
HGB BLD-MCNC: 11.5 G/DL (ref 11.5–16)
HGB BLD-MCNC: 11.8 G/DL (ref 11.5–16)
IMM GRANULOCYTES # BLD: 0 K/UL
IMM GRANULOCYTES NFR BLD AUTO: 0 %
LYMPHOCYTES # BLD: 0.1 K/UL (ref 0.8–3.5)
LYMPHOCYTES NFR BLD: 1 % (ref 12–49)
MAGNESIUM SERPL-MCNC: 2.2 MG/DL (ref 1.6–2.4)
MAGNESIUM SERPL-MCNC: 2.3 MG/DL (ref 1.6–2.4)
MCH RBC QN AUTO: 26.7 PG (ref 26–34)
MCH RBC QN AUTO: 27.3 PG (ref 26–34)
MCHC RBC AUTO-ENTMCNC: 29.6 G/DL (ref 30–36.5)
MCHC RBC AUTO-ENTMCNC: 30.7 G/DL (ref 30–36.5)
MCV RBC AUTO: 89.1 FL (ref 80–99)
MCV RBC AUTO: 90 FL (ref 80–99)
MONOCYTES # BLD: 0.5 K/UL (ref 0–1)
MONOCYTES NFR BLD: 4 % (ref 5–13)
NEUTS BAND NFR BLD MANUAL: 3 % (ref 0–6)
NEUTS SEG # BLD: 12.4 K/UL (ref 1.8–8)
NEUTS SEG NFR BLD: 92 % (ref 32–75)
NRBC # BLD: 0 K/UL (ref 0–0.01)
NRBC # BLD: 0 K/UL (ref 0–0.01)
NRBC BLD-RTO: 0 PER 100 WBC
NRBC BLD-RTO: 0 PER 100 WBC
PHOSPHATE SERPL-MCNC: 2.8 MG/DL (ref 2.6–4.7)
PHOSPHATE SERPL-MCNC: 4.9 MG/DL (ref 2.6–4.7)
PLATELET # BLD AUTO: 194 K/UL (ref 150–400)
PLATELET # BLD AUTO: 197 K/UL (ref 150–400)
PMV BLD AUTO: 10.8 FL (ref 8.9–12.9)
PMV BLD AUTO: 10.9 FL (ref 8.9–12.9)
POTASSIUM SERPL-SCNC: 4.8 MMOL/L (ref 3.5–5.1)
POTASSIUM SERPL-SCNC: 5.2 MMOL/L (ref 3.5–5.1)
RBC # BLD AUTO: 4.21 M/UL (ref 3.8–5.2)
RBC # BLD AUTO: 4.42 M/UL (ref 3.8–5.2)
RBC MORPH BLD: ABNORMAL
RBC MORPH BLD: ABNORMAL
SODIUM SERPL-SCNC: 135 MMOL/L (ref 136–145)
SODIUM SERPL-SCNC: 139 MMOL/L (ref 136–145)
WBC # BLD AUTO: 12.3 K/UL (ref 3.6–11)
WBC # BLD AUTO: 13 K/UL (ref 3.6–11)

## 2018-12-14 PROCEDURE — 36415 COLL VENOUS BLD VENIPUNCTURE: CPT

## 2018-12-14 PROCEDURE — 77030011641 HC PASTE OST ADH BMS -A

## 2018-12-14 PROCEDURE — 74011250636 HC RX REV CODE- 250/636: Performed by: ANESTHESIOLOGY

## 2018-12-14 PROCEDURE — 97161 PT EVAL LOW COMPLEX 20 MIN: CPT

## 2018-12-14 PROCEDURE — 85027 COMPLETE CBC AUTOMATED: CPT

## 2018-12-14 PROCEDURE — 74011000250 HC RX REV CODE- 250: Performed by: COLON & RECTAL SURGERY

## 2018-12-14 PROCEDURE — 74011250636 HC RX REV CODE- 250/636

## 2018-12-14 PROCEDURE — G8979 MOBILITY GOAL STATUS: HCPCS

## 2018-12-14 PROCEDURE — G8978 MOBILITY CURRENT STATUS: HCPCS

## 2018-12-14 PROCEDURE — 80048 BASIC METABOLIC PNL TOTAL CA: CPT

## 2018-12-14 PROCEDURE — 84100 ASSAY OF PHOSPHORUS: CPT

## 2018-12-14 PROCEDURE — 74011250636 HC RX REV CODE- 250/636: Performed by: COLON & RECTAL SURGERY

## 2018-12-14 PROCEDURE — 97116 GAIT TRAINING THERAPY: CPT

## 2018-12-14 PROCEDURE — 83735 ASSAY OF MAGNESIUM: CPT

## 2018-12-14 PROCEDURE — 65270000029 HC RM PRIVATE

## 2018-12-14 PROCEDURE — 74011250637 HC RX REV CODE- 250/637: Performed by: COLON & RECTAL SURGERY

## 2018-12-14 RX ORDER — CELECOXIB 100 MG/1
100 CAPSULE ORAL 2 TIMES DAILY
Status: DISCONTINUED | OUTPATIENT
Start: 2018-12-15 | End: 2018-12-18 | Stop reason: HOSPADM

## 2018-12-14 RX ORDER — CELECOXIB 100 MG/1
100 CAPSULE ORAL 2 TIMES DAILY
Status: DISCONTINUED | OUTPATIENT
Start: 2018-12-15 | End: 2018-12-14

## 2018-12-14 RX ORDER — NALOXONE HYDROCHLORIDE 0.4 MG/ML
0.4 INJECTION, SOLUTION INTRAMUSCULAR; INTRAVENOUS; SUBCUTANEOUS AS NEEDED
Status: DISCONTINUED | OUTPATIENT
Start: 2018-12-14 | End: 2018-12-18 | Stop reason: HOSPADM

## 2018-12-14 RX ORDER — KETOROLAC TROMETHAMINE 30 MG/ML
15 INJECTION, SOLUTION INTRAMUSCULAR; INTRAVENOUS EVERY 6 HOURS
Status: COMPLETED | OUTPATIENT
Start: 2018-12-14 | End: 2018-12-15

## 2018-12-14 RX ORDER — NYSTATIN 100000 [USP'U]/G
POWDER TOPICAL 2 TIMES DAILY
Status: DISCONTINUED | OUTPATIENT
Start: 2018-12-14 | End: 2018-12-18 | Stop reason: HOSPADM

## 2018-12-14 RX ORDER — ONDANSETRON 4 MG/1
4 TABLET, ORALLY DISINTEGRATING ORAL
Status: DISCONTINUED | OUTPATIENT
Start: 2018-12-14 | End: 2018-12-18 | Stop reason: HOSPADM

## 2018-12-14 RX ORDER — ACETAMINOPHEN 500 MG
1000 TABLET ORAL EVERY 6 HOURS
Status: DISCONTINUED | OUTPATIENT
Start: 2018-12-14 | End: 2018-12-18 | Stop reason: HOSPADM

## 2018-12-14 RX ORDER — LORATADINE 10 MG/1
10 TABLET ORAL
Status: DISCONTINUED | OUTPATIENT
Start: 2018-12-14 | End: 2018-12-18 | Stop reason: HOSPADM

## 2018-12-14 RX ORDER — ENOXAPARIN SODIUM 100 MG/ML
40 INJECTION SUBCUTANEOUS EVERY 24 HOURS
Status: DISCONTINUED | OUTPATIENT
Start: 2018-12-14 | End: 2018-12-18 | Stop reason: HOSPADM

## 2018-12-14 RX ORDER — OXYCODONE HYDROCHLORIDE 5 MG/1
5 TABLET ORAL
Status: DISCONTINUED | OUTPATIENT
Start: 2018-12-14 | End: 2018-12-18 | Stop reason: HOSPADM

## 2018-12-14 RX ORDER — BENZONATATE 100 MG/1
100 CAPSULE ORAL
Status: DISCONTINUED | OUTPATIENT
Start: 2018-12-14 | End: 2018-12-18 | Stop reason: HOSPADM

## 2018-12-14 RX ORDER — METHIMAZOLE 5 MG/1
5 TABLET ORAL
Status: DISCONTINUED | OUTPATIENT
Start: 2018-12-14 | End: 2018-12-18 | Stop reason: HOSPADM

## 2018-12-14 RX ORDER — ALVIMOPAN 12 MG/1
12 CAPSULE ORAL 2 TIMES DAILY
Status: DISCONTINUED | OUTPATIENT
Start: 2018-12-14 | End: 2018-12-18 | Stop reason: HOSPADM

## 2018-12-14 RX ORDER — KETOROLAC TROMETHAMINE 30 MG/ML
15 INJECTION, SOLUTION INTRAMUSCULAR; INTRAVENOUS EVERY 6 HOURS
Status: DISCONTINUED | OUTPATIENT
Start: 2018-12-14 | End: 2018-12-14

## 2018-12-14 RX ADMIN — KETOROLAC TROMETHAMINE 15 MG: 30 INJECTION, SOLUTION INTRAMUSCULAR at 23:49

## 2018-12-14 RX ADMIN — KETOROLAC TROMETHAMINE 15 MG: 30 INJECTION, SOLUTION INTRAMUSCULAR at 09:48

## 2018-12-14 RX ADMIN — ACETAMINOPHEN 1000 MG: 500 TABLET ORAL at 07:42

## 2018-12-14 RX ADMIN — Medication 10 ML: at 07:43

## 2018-12-14 RX ADMIN — METHIMAZOLE 5 MG: 5 TABLET ORAL at 21:06

## 2018-12-14 RX ADMIN — KETOROLAC TROMETHAMINE 15 MG: 30 INJECTION, SOLUTION INTRAMUSCULAR at 18:10

## 2018-12-14 RX ADMIN — Medication 10 ML: at 14:25

## 2018-12-14 RX ADMIN — FENTANYL CITRATE 25 MCG: 50 INJECTION INTRAMUSCULAR; INTRAVENOUS at 00:23

## 2018-12-14 RX ADMIN — FENTANYL CITRATE 25 MCG: 50 INJECTION INTRAMUSCULAR; INTRAVENOUS at 00:34

## 2018-12-14 RX ADMIN — FENTANYL CITRATE 25 MCG: 50 INJECTION INTRAMUSCULAR; INTRAVENOUS at 00:29

## 2018-12-14 RX ADMIN — ACETAMINOPHEN 1000 MG: 500 TABLET ORAL at 21:06

## 2018-12-14 RX ADMIN — ENOXAPARIN SODIUM 40 MG: 40 INJECTION SUBCUTANEOUS at 09:28

## 2018-12-14 RX ADMIN — ALVIMOPAN 12 MG: 12 CAPSULE ORAL at 18:10

## 2018-12-14 RX ADMIN — ALVIMOPAN 12 MG: 12 CAPSULE ORAL at 09:28

## 2018-12-14 RX ADMIN — ONDANSETRON 4 MG: 4 TABLET, ORALLY DISINTEGRATING ORAL at 02:13

## 2018-12-14 RX ADMIN — LIDOCAINE HYDROCHLORIDE 1 MG/KG/HR: 8 INJECTION, SOLUTION INTRAVENOUS at 00:42

## 2018-12-14 RX ADMIN — SODIUM CHLORIDE, SODIUM LACTATE, POTASSIUM CHLORIDE, AND CALCIUM CHLORIDE 75 ML/HR: 600; 310; 30; 20 INJECTION, SOLUTION INTRAVENOUS at 09:28

## 2018-12-14 RX ADMIN — NYSTATIN: 100000 POWDER TOPICAL at 09:27

## 2018-12-14 RX ADMIN — ACETAMINOPHEN 1000 MG: 500 TABLET ORAL at 14:25

## 2018-12-14 NOTE — PROGRESS NOTES
Problem: Mobility Impaired (Adult and Pediatric)  Goal: *Acute Goals and Plan of Care (Insert Text)  Physical Therapy Goals  Initiated 12/14/2018  1. Patient will move from supine to sit and sit to supine , scoot up and down and roll side to side in bed with independence within 7 day(s). 2.  Patient will transfer from bed to chair and chair to bed with independence using the least restrictive device within 7 day(s). 3.  Patient will perform sit to stand with independence within 7 day(s). 4.  Patient will ambulate with independence for 300 feet with the least restrictive device within 7 day(s). 5.  Patient will ascend/descend 14 stairs with one handrail(s) with modified independence within 7 day(s). physical Therapy EVALUATION  Patient: Gagandeep Hess (34 y.o. female)  Date: 12/14/2018  Primary Diagnosis: RECTAL CANCER  Rectal cancer ([facility] [address]  Procedure(s) (LRB):  CYSTOSCOPY WITH PLACEMENT BILATERAL URETERAL CATHETERS, HAND ASSISTED LAPAROSCOPIC LOW ANTERIOR RESECTION, MOBILIZATION OF SPLENIC FLECTURE,  COLONIC J POUCH, COLOANAL ANASTOMOSIS AND LOOP ILEOSTOMY (N/A)  CYSTOSCOPY IINSERTION OF BILATERAL URETERAL STENTS (Bilateral) 1 Day Post-Op   Precautions: fall       ASSESSMENT :  Based on the objective data described below, the patient presents with generalized weakness, need for extra time for mobility, decline in mobility status from her baseline of independent, and back discomfort (nurse called to get pt a new bed). . Educated her about use of pillow to slint and log rolling for bed mobility. She amb 60 feet using the iv pole for support. Vital sign monitored and were stable, see chart below. Anticipate steady gains with mobility allowing for discharge to home with her  who pt describes as very supportive. For the weekend I recommend that pt be up to the chair for all meals and amb with staff in the hallway 3-4 times a day. PT to return after the weekend. .        Vitals:    12/14/18 1304 12/14/18 1312 12/14/18 1317 12/14/18 1325   BP: 122/68 127/70 118/66 136/64   BP 1 Location: Right arm Right arm Right arm Right arm   BP Patient Position: Supine;Pre-activity Sitting Standing Supine; Post activity   Pulse: 97 (!) 110 (!) 105 87   Resp:       Temp:       SpO2: on room air 98% 97%  97%                         Patient will benefit from skilled intervention to address the above impairments. Patients rehabilitation potential is considered to be Good  Factors which may influence rehabilitation potential include:   []         None noted  []         Mental ability/status  [x]         Medical condition  []         Home/family situation and support systems  []         Safety awareness  []         Pain tolerance/management  []         Other:      PLAN :  Recommendations and Planned Interventions:  [x]           Bed Mobility Training             []    Neuromuscular Re-Education  [x]           Transfer Training                   []    Orthotic/Prosthetic Training  [x]           Gait Training                         []    Modalities  [x]           Therapeutic Exercises           []    Edema Management/Control  [x]           Therapeutic Activities            [x]    Patient and Family Training/Education  []           Other (comment):    Frequency/Duration: Patient will be followed by physical therapy  5 times a week to address goals. Discharge Recommendations: None  Further Equipment Recommendations for Discharge: none     SUBJECTIVE:   Patient stated I'm a little nervous.     OBJECTIVE DATA SUMMARY:   Consult received, chart reviewed, pt cleared by nursing  HISTORY:    Past Medical History:   Diagnosis Date    Asthma     SEASONAL ALLERGY INDUCED    GERD (gastroesophageal reflux disease)     Rectal cancer ([facility] [address] 06/29/2018    Diagnosed via colonoscopy on 6/29/2018. Staged as CJ0M9M0. Treated with neoadjuvant chemoradiation using Xeloda and 5040 cGy radiation and with the final radiation dose administered on 9/20/2018.  Thyroid disease 2013    hyperthyroidism     Past Surgical History:   Procedure Laterality Date    HX COLONOSCOPY  06/29/2018    Dr. Laura Cortez.  HX GI  2005    Laparoscopic cholecystectomy.  HX HEENT  2015    Sinus surgery.  HX ORTHOPAEDIC Right 1998    Right carpal tunnel release.  HX ORTHOPAEDIC  1982    Right ulnar nerve repositioning. 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  Personal factors and/or comorbidities impacting plan of care: see medical history    Home Situation  Home Environment: Private residence  # Steps to Enter: 3  Rails to Enter: Yes  Hand Rails : Bilateral  One/Two Story Residence: Two story  # of Interior Steps: 15  Interior Rails: Right  Living Alone: No  Support Systems: Spouse/Significant Other/Partner, Child(shorty)  Patient Expects to be Discharged to[de-identified] Private residence  Current DME Used/Available at Home: None    EXAMINATION/PRESENTATION/DECISION MAKING:   Critical Behavior:  Neurologic State: Alert  Orientation Level: Oriented X4  Cognition: Appropriate decision making, Appropriate for age attention/concentration, Appropriate safety awareness, Follows commands     Hearing: Auditory  Auditory Impairment: None  Skin:  Refer to MD and nursing notes  Edema: refer to MD and nursing notes  Range Of Motion:  AROM: Generally decreased, functional                       Strength:    Strength: Generally decreased, functional                    Tone & Sensation:                  Sensation: Intact               Coordination:     Vision:      Functional Mobility:  Bed Mobility:  Rolling: Minimum assistance  Supine to Sit: Moderate assistance  Sit to Supine: Moderate assistance     Transfers:  Sit to Stand: Minimum assistance  Stand to Sit: Contact guard assistance                       Balance:   Sitting: Intact; Without support  Standing: Intact; With support  Ambulation/Gait Training:  Distance (ft): 60 Feet (ft)  Assistive Device: Gait belt(pt holding onto iv pole)  Ambulation - Level of Assistance: Contact guard assistance        Gait Abnormalities: Antalgic;Decreased step clearance        Base of Support: Narrowed     Speed/Nelli: Slow;Pace decreased (<100 feet/min)  Step Length: Left shortened;Right shortened                     Stairs: Therapeutic Exercises: Ankle pumps    Functional Measure:  Timed up and go:    Timed Get Up And Go Test: 0(greater than 20 seconds)     Timed Up and Go and G-code impairment scale:  Percentage of Impairment CH    0%   CI    1-19% CJ    20-39% CK    40-59% CL    60-79% CM    80-99% CN     100%   Timed   Score 0-56 10 11-12 13-14 15-16 17-18 19 20       < than 10 seconds=Normal  Greater then 13.5 seconds (in elderly)=Increased fall risk   Nestor Martínez [place] M. Predicting the probability for falls in community dwelling older adults using the Timed Up and Go Test. Phys Ther. 2000;80:896-903. G codes: In compliance with CMSs Claims Based Outcome Reporting, the following G-code set was chosen for this patient based on their primary functional limitation being treated: The outcome measure chosen to determine the severity of the functional limitation was the TUG with a score of 0 which was correlated with the impairment scale.     ? Mobility - Walking and Moving Around:     - CURRENT STATUS: CN - 100% impaired, limited or restricted    - GOAL STATUS: CH - 0% impaired, limited or restricted    - D/C STATUS:  ---------------To be determined---------------      Physical Therapy Evaluation Charge Determination   History Examination Presentation Decision-Making   MEDIUM  Complexity : 1-2 comorbidities / personal factors will impact the outcome/ POC  LOW Complexity : 1-2 Standardized tests and measures addressing body structure, function, activity limitation and / or participation in recreation  LOW Complexity : Stable, uncomplicated  LOW Complexity : FOTO score of  Based on the above components, the patient evaluation is determined to be of the following complexity level: LOW     Pain:  Pain Scale 1: Numeric (0 - 10)  Pain Intensity 1: 2  Pain Location 1: Abdomen  Pain Orientation 1: Anterior  Pain Description 1: Aching  Pain Intervention(s) 1: Repositioned  Activity Tolerance:   See assessment above  Please refer to the flowsheet for vital signs taken during this treatment. After treatment:   []         Patient left in no apparent distress sitting up in chair  [x]         Patient left in no apparent distress in bed  [x]         Call bell left within reach  [x]         Nursing notified  []         Caregiver present  []         Bed alarm activated    COMMUNICATION/EDUCATION:   The patients plan of care was discussed with: Registered Nurse. [x]         Fall prevention education was provided and the patient/caregiver indicated understanding. [x]         Patient/family have participated as able in goal setting and plan of care. [x]         Patient/family agree to work toward stated goals and plan of care. []         Patient understands intent and goals of therapy, but is neutral about his/her participation. []         Patient is unable to participate in goal setting and plan of care.     Thank you for this referral.  Elif Leonardo   Time Calculation: 33 mins

## 2018-12-14 NOTE — PERIOP NOTES
TRANSFER - OUT REPORT:    Verbal report given to  caleb Murrieta  being transferred to 433(unit) for routine post - op       Report consisted of patients Situation, Background, Assessment and   Recommendations(SBAR). Time Pre op antibiotic given: cefotan 2 grams at 1205/1805  Anesthesia Stop time: 2347  Acosta Present on Transfer to floor:yes  Order for Acosta on Chart: yes  Discharge Prescriptions with Chart:yes    Information from the following report(s) SBAR, Procedure Summary, Intake/Output, MAR, Recent Results and Cardiac Rhythm nsr was reviewed with the receiving nurse. Opportunity for questions and clarification was provided. Is the patient on 02? YES       L/Min 2       Other     Is the patient on a monitor? YES    Is the nurse transporting with the patient? YES    Surgical Waiting Area notified of patient's transfer from PACU?  YES      The following personal items collected during your admission accompanied patient upon transfer:   Dental Appliance: Dental Appliances: None  Vision: Visual Aid: Glasses  Hearing Aid:    Jewelry:    Clothing: Clothing: (clothes to pacu)  Other Valuables:    Valuables sent to safe:        Clothing bag x 1

## 2018-12-14 NOTE — PROGRESS NOTES
Problem: ERAS-Colorectal Surger:Discharge Outcomes  Goal: *Verbalizes understanding and describes prescribed diet  Outcome: Progressing Towards Goal  NUTRITION education brief       MD consult for low fiber education received. Pt s/p colon resection and loop ileostomy placement for colon CA yesterday. Remains on clears for today per RN and pt d/t extensive surgery. Ensure Enlive shakes ordered BID. Pt and  visited today. Handouts for low fiber/ileostomy diet provided but pt would like to defer education since drowsy. She reports likely will be discharged on Monday if progresses well per discussion with CRS. Will follow for discussion regarding diet later today or on Monday. Encourage pt to read through material in the interim. Wt stable with no reports of decreased appetite PTA.      Kalyan Calixto RD

## 2018-12-14 NOTE — PROGRESS NOTES
General Daily Progress Note    Admission Date: 12/13/2018  Hospital Day 2  Post-Op Day 1  Subjective:   No nausea at the moment. Pain control adequate. No dyspnea. Objective:     Patient Vitals for the past 24 hrs:   BP Temp Pulse Resp SpO2 Height Weight   12/14/18 0906 123/61 98 °F (36.7 °C) 96 16 97 %     12/14/18 0705 126/56 98.7 °F (37.1 °C) 97 16 98 %     12/14/18 0353 134/68 98.7 °F (37.1 °C) 99 16 98 %     12/14/18 0118 155/84 99 °F (37.2 °C) 91 14 96 %  101.5 kg (223 lb 12.3 oz)   12/14/18 0100 142/75  89 12 96 %     12/14/18 0045 139/77  87 12 96 %     12/14/18 0042  98.4 °F (36.9 °C)        12/14/18 0030 143/79  88 11 96 %     12/14/18 0015 147/78  89 13 97 %     12/14/18 0000 142/80  88 16 96 %     12/13/18 2350 145/82  91 15 97 %     12/13/18 2345 129/77  89 17 96 %     12/13/18 2343 140/80 98.6 °F (37 °C) 91 16 95 %     12/13/18 2340 140/80  90 17 96 %     12/13/18 1021 134/76 98.6 °F (37 °C) 95 17 100 % 5' 6.5\" (1.689 m) 98 kg (216 lb)     12/14 0701 - 12/14 1900  In: -   Out: 550 [Urine:500; Drains:50]  12/12 1901 - 12/14 0700  In: 3700 [I.V.:3700]  Out: 1425 [Urine:980; Drains:195]    Physical Examination:  General Appearance:  No distress. Chest:  Lungs clear to auscultation. Heart:  Normal rate and regular rhythm. Abdomen:  Incisions clean, dry, intact, and with ecchymosis but no abnormal erythema. Ileostomy edematous and a bit dusky, but viable. DIANNE drainage bloody. :  Acosta catheter in place. Extremities:  Compression stockings and pneumatic compression stockings in use.                Data Review   Recent Results (from the past 24 hour(s))   CEA    Collection Time: 12/13/18 10:55 AM   Result Value Ref Range    CEA 0.5 ng/mL   CBC WITH AUTOMATED DIFF    Collection Time: 12/13/18 11:48 PM   Result Value Ref Range    WBC 13.0 (H) 3.6 - 11.0 K/uL    RBC 4.42 3.80 - 5.20 M/uL    HGB 11.8 11.5 - 16.0 g/dL    HCT 39.8 35.0 - 47.0 %    MCV 90.0 80.0 - 99.0 FL    MCH 26.7 26.0 - 34.0 PG    MCHC 29.6 (L) 30.0 - 36.5 g/dL    RDW 14.6 (H) 11.5 - 14.5 %    PLATELET 436 807 - 526 K/uL    MPV 10.9 8.9 - 12.9 FL    NRBC 0.0 0  WBC    ABSOLUTE NRBC 0.00 0.00 - 0.01 K/uL    NEUTROPHILS 92 (H) 32 - 75 %    BAND NEUTROPHILS 3 0 - 6 %    LYMPHOCYTES 1 (L) 12 - 49 %    MONOCYTES 4 (L) 5 - 13 %    EOSINOPHILS 0 0 - 7 %    BASOPHILS 0 0 - 1 %    IMMATURE GRANULOCYTES 0 %    ABS. NEUTROPHILS 12.4 (H) 1.8 - 8.0 K/UL    ABS. LYMPHOCYTES 0.1 (L) 0.8 - 3.5 K/UL    ABS. MONOCYTES 0.5 0.0 - 1.0 K/UL    ABS. EOSINOPHILS 0.0 0.0 - 0.4 K/UL    ABS. BASOPHILS 0.0 0.0 - 0.1 K/UL    ABS. IMM.  GRANS. 0.0 K/UL    DF MANUAL      RBC COMMENTS ANISOCYTOSIS  1+        RBC COMMENTS OVALOCYTES  PRESENT       METABOLIC PANEL, BASIC    Collection Time: 12/13/18 11:48 PM   Result Value Ref Range    Sodium 139 136 - 145 mmol/L    Potassium 4.8 3.5 - 5.1 mmol/L    Chloride 104 97 - 108 mmol/L    CO2 21 21 - 32 mmol/L    Anion gap 14 5 - 15 mmol/L    Glucose 159 (H) 65 - 100 mg/dL    BUN 12 6 - 20 MG/DL    Creatinine 1.47 (H) 0.55 - 1.02 MG/DL    BUN/Creatinine ratio 8 (L) 12 - 20      GFR est AA 44 (L) >60 ml/min/1.73m2    GFR est non-AA 37 (L) >60 ml/min/1.73m2    Calcium 7.5 (L) 8.5 - 10.1 MG/DL   MAGNESIUM    Collection Time: 12/13/18 11:48 PM   Result Value Ref Range    Magnesium 2.3 1.6 - 2.4 mg/dL   PHOSPHORUS    Collection Time: 12/13/18 11:48 PM   Result Value Ref Range    Phosphorus 4.9 (H) 2.6 - 4.7 MG/DL   CBC W/O DIFF    Collection Time: 12/14/18  6:24 AM   Result Value Ref Range    WBC 12.3 (H) 3.6 - 11.0 K/uL    RBC 4.21 3.80 - 5.20 M/uL    HGB 11.5 11.5 - 16.0 g/dL    HCT 37.5 35.0 - 47.0 %    MCV 89.1 80.0 - 99.0 FL    MCH 27.3 26.0 - 34.0 PG    MCHC 30.7 30.0 - 36.5 g/dL    RDW 14.7 (H) 11.5 - 14.5 %    PLATELET 240 215 - 871 K/uL    MPV 10.8 8.9 - 12.9 FL    NRBC 0.0 0  WBC    ABSOLUTE NRBC 0.00 0.00 - 8.88 K/uL   METABOLIC PANEL, BASIC    Collection Time: 12/14/18  6:24 AM   Result Value Ref Range    Sodium 135 (L) 136 - 145 mmol/L    Potassium 5.2 (H) 3.5 - 5.1 mmol/L    Chloride 106 97 - 108 mmol/L    CO2 21 21 - 32 mmol/L    Anion gap 8 5 - 15 mmol/L    Glucose 130 (H) 65 - 100 mg/dL    BUN 11 6 - 20 MG/DL    Creatinine 1.05 (H) 0.55 - 1.02 MG/DL    BUN/Creatinine ratio 10 (L) 12 - 20      GFR est AA >60 >60 ml/min/1.73m2    GFR est non-AA 54 (L) >60 ml/min/1.73m2    Calcium 7.7 (L) 8.5 - 10.1 MG/DL   MAGNESIUM    Collection Time: 12/14/18  6:24 AM   Result Value Ref Range    Magnesium 2.2 1.6 - 2.4 mg/dL   PHOSPHORUS    Collection Time: 12/14/18  6:24 AM   Result Value Ref Range    Phosphorus 2.8 2.6 - 4.7 MG/DL           Assessment:     Principal Problem:    Rectal cancer metastasized to intrapelvic lymph node (HCC) (7/17/2018)    Active Problems:    Hyperthyroidism (12/13/2018)      Obesity (BMI 30.0-34.9) (12/13/2018)        1 Day Post-Op s/p hand-assisted laparoscopic low anterior resection, mobilization of the splenic flexure, creation of colonic J-pouch, coloanal anastomosis, and creation of loop ileostomy. Hemodynamically stable. Hemoglobin acceptable. Urine output adequate. Creatinine acceptable. Will hold off on advancing diet and removing Acosta catheter secondary to the nature, duration and timing of the operation. Will also keep patient in the [facility] today for close monitoring. Plan:   Clear liquid diet with caution. Continue Acosta catheter. Ambulate. Physical therapy. Incentive spirometer. Ostomy education.

## 2018-12-14 NOTE — PROGRESS NOTES
Music Therapy Assessment    Ángel Coleman [id number]  [id number]    1960  63 y.o.  female    Patient Telephone Number: [phone] (home)   Catholic Affiliation: Maximino Morin   Language: [place]   Extended Emergency Contact Information  Primary Emergency Contact: Cuauhtemoc Galeano  Address: [address] [place] [address] Phone: [phone]  Relation: Spouse   Patient Active Problem List    Diagnosis Date Noted    Hyperthyroidism 2018    Obesity (BMI 30.0-34.9) 2018    Rectal cancer metastasized to intrapelvic lymph node ([facility] [address] 2018    Class 1 obesity with serious comorbidity and body mass index (BMI) of 33.0 to 33.9 in adult 2018    Mixed hyperlipidemia 2011        Date: 2018       Mental Status:   [x] Alert [  ] Pao Furry [  ]  Confused  [  ] Minimally responsive    Communication Status: [  ] Impaired Speech [  ] Nonverbal -N/A    Physical Status:   [  ] Oxygen in use  [  ] Hard of Hearing [  ] Vision Impaired  [  ] Ambulatory  [  ] Ambulatory with assistance [  ] Non-ambulatory -N/A    Music Preferences, Background: Country, imagoos. Clinical Problem addressed: Support healthy pt/family coping, spiritual support, improve mood.     Goal(s) met in session:  Physical/Pain management (Scale of 1-10):    Pre-session ratin    Post-session ratin   [  ] Increased relaxation   [  ] Regulated breathing patterns  [  ] Decreased muscle tension   [  ] Minimized physical distress     Emotional/Psychological:  [x] Increased self-expression   [  ] Decreased aggressive behavior   [  ] Decreased sadness   [  ] Discussed healthy coping skills     [x] Improved mood    [  ] Decreased withdrawn behavior     Social:  [  ] Decreased feelings of isolation/loneliness [x] Positive social interaction   [x] Provided support and/or comfort for family/friends    Spiritual:  [x] Spiritual support    [  ] Expressed peace  [ ] Expressed jayesh    [  ] Discussed beliefs    Techniques Utilized (Check all that apply):   [  ] Procedural support MT [  ] Music for relaxation [x] Patient preferred music  [  ] Juanita analysis  [  ] Song choice  [  ] Music for validation  [  ] Entrainment  [  ] Progressive muscle relax. [  ] Guided visualization  [  ] Sima Lieu  [  ] Patient instrument playing [  ] Sheryl Cuevas writing  [x] Sing along   [  ] Improvisation  [x] Sensory stimulation  [  ] Active Listening  [x] Music for spiritual support [  ] Making of CDs as gifts    Session Observations:  Referral from Octaviano Chandra, Patient Advocate at the [facility]. Patient's call bell was ringing so this music therapist (MT) asked how to assist patient (pt). Pt was alert sitting in a chair with her daughter and cousin present. She requested to have her gown changed and MT alerted pt's nurse Delfina Hart of this. When MT re-entered pt was lying in bed in a fresh gown. Pt's brother-in-law and sister-in-law were now also at bedside. MT introduced self and briefly explained role. Pt shared how she has been feeling both physically and emotionally. MT provided active listening and words of validation. MT asked about pt's music preferences and pt shared these. She requested to hear [email or web address] songs. She said that she misses going to Buddhist because she has had to refrain from going due to chemotherapy. She tried going once but became sick so has not been back. MT sat at bedside and sang and played the [employer] [facility]) with henriquenorar. Pt's cousin lifted her hands during this song as if in Quaker. Pt closed her eyes and nodded her head to the lyrics at times. Pt expressed enjoyment in the music. MT sang and played the [employer] Pt sang along at times. Pt, her daughter and cousin became tearful in response to the music. They expressed gratitude for the session. Pt said this was the encouragement she needed today.  Will follow as shawn.    JO-ANN Houston (Music Therapist-Board Certified)  Spiritual Care Department  Referral-based service

## 2018-12-14 NOTE — PROGRESS NOTES
12/13/18 2225   Family Communication   Family Update Message Surgeon working   Delivery Origin Nurse  Maty Maharaj)    Relationship to Patient Spouse  (\"Cuauhtemoc\")    Phone Number ex [phone]   Family/Significant Other Update Called

## 2018-12-14 NOTE — PROGRESS NOTES
Consult received, chart reviewed, case discussed with pt's nurse who reports that pt had just returned to bed having been up to the chair and pt is now having another therapy (music therapy). Pt's nurse requests that we allow pt to rest at this time and try again later.  Thank you, Natalia Bay, PT

## 2018-12-14 NOTE — PERIOP NOTES
Patient: Wilfredo Louis MRN: [id number]  SSN: [id number]   YOB: 1960  Age: 63 y.o. Sex: female     Patient is status post Procedure(s) with comments:  CYSTOSCOPY WITH PLACEMENT BILATERAL URETERAL CATHETERS, HAND ASSISTED LAPAROSCOPIC LOW ANTERIOR RESECTION, MOBILIZATION OF SPLENIC FLECTURE,  COLONIC J POUCH, COLOANAL ANASTOMOSIS AND LOOP ILEOSTOMY - cystoscopy done at beginning of case by Dr. Leigh Saez. Surgeon(s) and Role:  Panel 1:     * Severo Brothers, MD - Primary  Panel 2:     * Kt Richardson MD - Primary    Local/Dose/Irrigation:  15 mL 0.5% marcaine with epi                  Peripheral IV 12/13/18 Right Hand (Active)       Peripheral IV 12/13/18 Left Hand (Active)          Naveed-Bella Drain 12/13/18 Left; Lower Abdomen (Active)      Airway - Endotracheal Tube 12/13/18 Oral (Active)                   Dressing/Packing:  Wound Abdomen-DRESSING TYPE: Topical skin adhesive/glue (12/13/18 0699)  Splint/Cast:  ]    Other:  Acosta

## 2018-12-14 NOTE — WOUND CARE
CWOCN Ostomy Progress Note:     First postoperative visit. Type of Ostomy: diverting loop ileostomy with LAR and j-pouch formation   Location: [facility]  Date of Surgery: 12/13/18      Surgeon: Dr. Bailee Ramírez    Findings:  Current appliance: Mead 2-piece flat applied in OR  Stoma size: ~1.5\"  Stoma color: red & moist  Characteristics: budded with red rubber cath to support  Abdomen: softly distended  Output: scant watery dark brown / positive flatus    Teaching/Subjects covered today:  Encouraged pt to review handout given to patient/family and ask questions regarding material on next ostomy nurse visit. - General anatomy and expectations of output  - Normal & abnormal stoma characteristics & changes over time  - Normal & abnormal peristomal characteristics   - When/how to clean stoma & peristomal skin  - When/how to empty pouch  - When to change appliance  - When to notify nurse/physician  - Dietary guidelines - nutritionist is on board  - Reviewed ADL's (bathing, mattress cover, car pillow to protect from seatbelt, etc)    Recommendations:  1. Empty appliance when 1/3 full and PRN. Encourage patient/family to notify nurse and  assist w/ pouch emptying to promote self-care. Change appliance twice weekly and PRN for leaking ASAP. DO NOT REINFORCE LEAKS to avoid skin breakdown. Transition of Care:  Ostomy nurse to return and reinforce teaching but will likely need home health care for further teaching after discharge. Home Health consult placed. Supplies left in room.      Romulo Maxwell RN, BSN, [facility]  Certified Wound, Ostomy, Continence Nurse  office: [phone]  pager [phone] or [phone]

## 2018-12-14 NOTE — BRIEF OP NOTE
BRIEF OPERATIVE NOTE    Date of Procedure:   12/13/2018   Preoperative Diagnosis:  Rectal cancer  Postoperative Diagnosis:  Rectal cancer  Procedure:  Hand-assisted laparoscopic low anterior resection, mobilization of the splenic flexure, creation of colonic J-pouch, coloanal anastomosis, and creation of loop ileostomy. Surgeon:  Fidelia Mcconnell MD  Assistant:  Natalia Abdullahi. GUSTABO Mcleod; Antonio Mancini [place]  Anesthesia:  General endotracheal  Estimated Blood Loss:  250 mL  Crystalloid:  3700 mL  Albumin:  750 mL  Urine:    555 mL  Specimens:   ID Type Source Tests Collected by Time Destination   1 : Rectum and Sigmoid Colon Fresh Colon, Recto-sigmoid  Uyen Moore MD 12/13/2018 1748 Pathology   2 : Additional Proximal Margin Fresh   Uyen Moore MD 12/13/2018 1927 Pathology   3 : Anastomotic Donuts Fresh   Uyen Moore MD 12/13/2018 2029 Pathology     Tubes and Drains:  10 mm Naveed-Bella drain in the pelvis. Findings:  Ulcerated, fibrotic area palpable in the posterior rectum with the distal margin at approximately 5 cm from the anal verge. Complications:  None apparent. Implants:   None.

## 2018-12-14 NOTE — ANESTHESIA POSTPROCEDURE EVALUATION
Post-Anesthesia Evaluation and Assessment    Patient: Rosalina Bowles MRN: [id number]  SSN: [id number]    YOB: 1960  Age: 63 y.o. Sex: female      I have evaluated the patient and they are stable and ready for discharge from the PACU. Cardiovascular Function/Vital Signs  Visit Vitals  /78   Pulse 89   Temp 37 °C (98.6 °F)   Resp 13   Ht 5' 6.5\" (1.689 m)   Wt 98 kg (216 lb)   SpO2 97%   Breastfeeding? No   BMI 34.34 kg/m²       Patient is status post General anesthesia for Procedure(s) with comments:  CYSTOSCOPY WITH PLACEMENT BILATERAL URETERAL CATHETERS, HAND ASSISTED LAPAROSCOPIC LOW ANTERIOR RESECTION, MOBILIZATION OF SPLENIC FLECTURE,  COLONIC J POUCH, COLOANAL ANASTOMOSIS AND LOOP ILEOSTOMY - cystoscopy done at beginning of case by Dr. Toya Vera. Nausea/Vomiting: None    Postoperative hydration reviewed and adequate. Pain:  Pain Scale 1: (P) Numeric (0 - 10) (12/14/18 0024)  Pain Intensity 1: (P) 7 (12/14/18 0024)   Managed    Neurological Status:   Neuro (WDL): (P) Within Defined Limits (12/14/18 0025)  Neuro  Neurologic State: (P) Drowsy (12/14/18 0025)  LUE Motor Response: (P) Purposeful (12/14/18 0025)  LLE Motor Response: (P) Purposeful (12/14/18 0025)  RUE Motor Response: (P) Purposeful (12/14/18 0025)  RLE Motor Response: (P) Purposeful (12/14/18 0025)   At baseline    Mental Status, Level of Consciousness: Alert and  oriented to person, place, and time    Pulmonary Status:   O2 Device: Nasal cannula (12/13/18 2343)   Adequate oxygenation and airway patent    Complications related to anesthesia: None    Post-anesthesia assessment completed.  No concerns    Signed By: Robbie Cadet MD     December 14, 2018              Procedure(s):  CYSTOSCOPY WITH PLACEMENT BILATERAL URETERAL CATHETERS, HAND ASSISTED LAPAROSCOPIC LOW ANTERIOR RESECTION, MOBILIZATION OF SPLENIC FLECTURE,  COLONIC J POUCH, COLOANAL ANASTOMOSIS AND LOOP ILEOSTOMY  CYSTOSCOPY IINSERTION OF BILATERAL URETERAL STENTS.    <BSHSIANPOST>    Visit Vitals  /78   Pulse 89   Temp 37 °C (98.6 °F)   Resp 13   Ht 5' 6.5\" (1.689 m)   Wt 98 kg (216 lb)   SpO2 97%   Breastfeeding?  No   BMI 34.34 kg/m²

## 2018-12-15 LAB
ANION GAP SERPL CALC-SCNC: 4 MMOL/L (ref 5–15)
BASOPHILS # BLD: 0 K/UL (ref 0–0.1)
BASOPHILS NFR BLD: 0 % (ref 0–1)
BUN SERPL-MCNC: 9 MG/DL (ref 6–20)
BUN/CREAT SERPL: 12 (ref 12–20)
CALCIUM SERPL-MCNC: 7.7 MG/DL (ref 8.5–10.1)
CHLORIDE SERPL-SCNC: 109 MMOL/L (ref 97–108)
CO2 SERPL-SCNC: 26 MMOL/L (ref 21–32)
CREAT SERPL-MCNC: 0.73 MG/DL (ref 0.55–1.02)
DIFFERENTIAL METHOD BLD: ABNORMAL
EOSINOPHIL # BLD: 0 K/UL (ref 0–0.4)
EOSINOPHIL NFR BLD: 0 % (ref 0–7)
ERYTHROCYTE [DISTWIDTH] IN BLOOD BY AUTOMATED COUNT: 14.9 % (ref 11.5–14.5)
GLUCOSE SERPL-MCNC: 104 MG/DL (ref 65–100)
HCT VFR BLD AUTO: 36 % (ref 35–47)
HGB BLD-MCNC: 10.6 G/DL (ref 11.5–16)
IMM GRANULOCYTES # BLD: 0 K/UL (ref 0–0.04)
IMM GRANULOCYTES NFR BLD AUTO: 0 % (ref 0–0.5)
LYMPHOCYTES # BLD: 0.8 K/UL (ref 0.8–3.5)
LYMPHOCYTES NFR BLD: 9 % (ref 12–49)
MAGNESIUM SERPL-MCNC: 2.3 MG/DL (ref 1.6–2.4)
MCH RBC QN AUTO: 26.6 PG (ref 26–34)
MCHC RBC AUTO-ENTMCNC: 29.4 G/DL (ref 30–36.5)
MCV RBC AUTO: 90.5 FL (ref 80–99)
MONOCYTES # BLD: 0.4 K/UL (ref 0–1)
MONOCYTES NFR BLD: 5 % (ref 5–13)
NEUTS SEG # BLD: 7.2 K/UL (ref 1.8–8)
NEUTS SEG NFR BLD: 86 % (ref 32–75)
NRBC # BLD: 0 K/UL (ref 0–0.01)
NRBC BLD-RTO: 0 PER 100 WBC
PHOSPHATE SERPL-MCNC: 2.1 MG/DL (ref 2.6–4.7)
PLATELET # BLD AUTO: 161 K/UL (ref 150–400)
PLATELET COMMENTS,PCOM: ABNORMAL
PMV BLD AUTO: 11.1 FL (ref 8.9–12.9)
POTASSIUM SERPL-SCNC: 4 MMOL/L (ref 3.5–5.1)
RBC # BLD AUTO: 3.98 M/UL (ref 3.8–5.2)
RBC MORPH BLD: ABNORMAL
SODIUM SERPL-SCNC: 139 MMOL/L (ref 136–145)
WBC # BLD AUTO: 8.4 K/UL (ref 3.6–11)

## 2018-12-15 PROCEDURE — 74011250636 HC RX REV CODE- 250/636: Performed by: COLON & RECTAL SURGERY

## 2018-12-15 PROCEDURE — 83735 ASSAY OF MAGNESIUM: CPT

## 2018-12-15 PROCEDURE — 74011000250 HC RX REV CODE- 250: Performed by: COLON & RECTAL SURGERY

## 2018-12-15 PROCEDURE — 74011250637 HC RX REV CODE- 250/637: Performed by: COLON & RECTAL SURGERY

## 2018-12-15 PROCEDURE — 80048 BASIC METABOLIC PNL TOTAL CA: CPT

## 2018-12-15 PROCEDURE — 36415 COLL VENOUS BLD VENIPUNCTURE: CPT

## 2018-12-15 PROCEDURE — 84100 ASSAY OF PHOSPHORUS: CPT

## 2018-12-15 PROCEDURE — 85025 COMPLETE CBC W/AUTO DIFF WBC: CPT

## 2018-12-15 PROCEDURE — 65270000029 HC RM PRIVATE

## 2018-12-15 RX ORDER — SODIUM,POTASSIUM PHOSPHATES 280-250MG
1 POWDER IN PACKET (EA) ORAL 4 TIMES DAILY
Status: DISCONTINUED | OUTPATIENT
Start: 2018-12-15 | End: 2018-12-16

## 2018-12-15 RX ADMIN — POTASSIUM & SODIUM PHOSPHATES POWDER PACK 280-160-250 MG 1 PACKET: 280-160-250 PACK at 20:20

## 2018-12-15 RX ADMIN — POTASSIUM & SODIUM PHOSPHATES POWDER PACK 280-160-250 MG 1 PACKET: 280-160-250 PACK at 15:06

## 2018-12-15 RX ADMIN — NYSTATIN: 100000 POWDER TOPICAL at 09:00

## 2018-12-15 RX ADMIN — ALVIMOPAN 12 MG: 12 CAPSULE ORAL at 08:57

## 2018-12-15 RX ADMIN — KETOROLAC TROMETHAMINE 15 MG: 30 INJECTION, SOLUTION INTRAMUSCULAR at 07:10

## 2018-12-15 RX ADMIN — METHIMAZOLE 5 MG: 5 TABLET ORAL at 21:48

## 2018-12-15 RX ADMIN — LIDOCAINE HYDROCHLORIDE 1 MG/KG/HR: 8 INJECTION, SOLUTION INTRAVENOUS at 13:17

## 2018-12-15 RX ADMIN — ACETAMINOPHEN 1000 MG: 500 TABLET ORAL at 08:56

## 2018-12-15 RX ADMIN — Medication 10 ML: at 03:18

## 2018-12-15 RX ADMIN — ACETAMINOPHEN 1000 MG: 500 TABLET ORAL at 15:06

## 2018-12-15 RX ADMIN — ACETAMINOPHEN 1000 MG: 500 TABLET ORAL at 19:02

## 2018-12-15 RX ADMIN — Medication 10 ML: at 07:10

## 2018-12-15 RX ADMIN — CELECOXIB 100 MG: 100 CAPSULE ORAL at 18:56

## 2018-12-15 RX ADMIN — ENOXAPARIN SODIUM 40 MG: 40 INJECTION SUBCUTANEOUS at 08:57

## 2018-12-15 RX ADMIN — CELECOXIB 100 MG: 100 CAPSULE ORAL at 08:57

## 2018-12-15 RX ADMIN — ACETAMINOPHEN 1000 MG: 500 TABLET ORAL at 03:15

## 2018-12-15 RX ADMIN — ALVIMOPAN 12 MG: 12 CAPSULE ORAL at 18:56

## 2018-12-15 NOTE — PROGRESS NOTES
Problem: Falls - Risk of  Goal: *Absence of Falls  Document Mitesh Fall Risk and appropriate interventions in the flowsheet.   Outcome: Progressing Towards Goal  Fall Risk Interventions:            Medication Interventions: Patient to call before getting OOB         History of Falls Interventions: Door open when patient unattended        Problem: ERAS-Colorectal Surgery:Post-op Day 1  Goal: Activity/Safety  Outcome: Progressing Towards Goal  Pt ambulating in room tolerating well  Goal: Nutrition/Diet  Outcome: Progressing Towards Goal  Pt tolerating clears no N/V  Goal: *Optimal pain control at patient's stated goal  Outcome: Progressing Towards Goal  Minimal complaints of pain  Goal: *Hemodynamically stable  Outcome: Progressing Towards Goal  VS stable

## 2018-12-15 NOTE — PROGRESS NOTES
Bedside shift change report given to [address] [place] [address] (oncoming nurse) by Sivakumar Jones (offgoing nurse). Report included the following information SBAR, Intake/Output, MAR, Recent Results and Cardiac Rhythm NSR.

## 2018-12-15 NOTE — PROGRESS NOTES
Bedside shift change report given to [facility], [facility] (oncoming nurse) by CHARLES Perez (offgoing nurse). Report included the following information SBAR, Procedure Summary, Intake/Output, MAR and Recent Results.

## 2018-12-15 NOTE — PROGRESS NOTES
Reason for Admission:   Rectal CA , POD 2 of a laparoscopic low anterior resection, mobilization of the splenic flexure, creation of colonic J-pouch, coloanal anastomosis, and creation of loop ileostomy                   RRAT Score:       8              Plan for utilizing home health:    Referral placed to [address] [place] [address] of Readmission:  mod                         Transition of Care Plan:     Patient reports independence and lives with spouse. Demographics verified. Discussed the recommendation of home health services. [place] of choice provided and referral placed to [facility] in [place]. Care Management Interventions  PCP Verified by CM:  Yes  Transition of Care Consult (CM Consult): [address]: No  Reason Outside [place]: Unable to staff case  [place] Ple[place]: No  Discharge Durable Medical Equipment: No  Physical Therapy Consult: Yes  Occupational Therapy Consult: No  Speech Therapy Consult: No  Current Support Network: Lives with Spouse  Plan discussed with Pt/Family/Caregiver: Yes  Freedom of Choice Offered: Yes  Discharge Location  Discharge Placement: Home with home health

## 2018-12-15 NOTE — PROGRESS NOTES
General Daily Progress Note    Admission Date: 12/13/2018  Hospital Day 3  Post-Op Day 2  Subjective:   No nausea. No dyspnea. Good pain control. Pain in post-sacral area and buttocks is somewhat better than it was yesterday. Objective:     Patient Vitals for the past 24 hrs:   BP Temp Pulse Resp SpO2 Weight   12/15/18 0718 117/62 98.1 °F (36.7 °C) 87 16 94 %    12/15/18 0305 138/78 98.9 °F (37.2 °C) 91 17 93 %    12/15/18 0126      101.3 kg (223 lb 5.2 oz)   12/14/18 2310 126/72 98.7 °F (37.1 °C) 99 17 92 %    12/14/18 1907 120/59 98.9 °F (37.2 °C) 96 18 94 %    12/14/18 1622 125/67 98.7 °F (37.1 °C) 92 18 94 %    12/14/18 1325 136/64  87  97 %    12/14/18 1317 118/66  (!) 105      12/14/18 1312 127/70  (!) 110  97 %    12/14/18 1304 122/68  97  98 %    12/14/18 1200 127/64 97.6 °F (36.4 °C) 99 16 95 %      12/15 0701 - 12/15 1900  In: -   Out: 290 [Urine:150; Drains:15]  12/13 1901 - 12/15 0700  In: 1475.5 [P.O.:75; I.V.:1400.5]  Out: 4705 [Urine:4000; Drains:345]      Physical Examination:  General Appearance:  No distress. Chest:  Lungs clear to auscultation. Heart:  Normal rate and regular rhythm. Abdomen:  Incisions clean, dry, intact, and with ecchymosis but no abnormal erythema. Ileostomy edematous and a bit dusky, but viable. DIANNE drainage serosanguinous. :  Acosta catheter in place. Extremities:  Compression stockings and pneumatic compression stockings in use.            Data Review   Recent Results (from the past 24 hour(s))   CBC WITH AUTOMATED DIFF    Collection Time: 12/15/18  3:19 AM   Result Value Ref Range    WBC 8.4 3.6 - 11.0 K/uL    RBC 3.98 3.80 - 5.20 M/uL    HGB 10.6 (L) 11.5 - 16.0 g/dL    HCT 36.0 35.0 - 47.0 %    MCV 90.5 80.0 - 99.0 FL    MCH 26.6 26.0 - 34.0 PG    MCHC 29.4 (L) 30.0 - 36.5 g/dL    RDW 14.9 (H) 11.5 - 14.5 %    PLATELET 561 088 - 068 K/uL    MPV 11.1 8.9 - 12.9 FL    NRBC 0.0 0  WBC    ABSOLUTE NRBC 0.00 0.00 - 0.01 K/uL NEUTROPHILS 86 (H) 32 - 75 %    LYMPHOCYTES 9 (L) 12 - 49 %    MONOCYTES 5 5 - 13 %    EOSINOPHILS 0 0 - 7 %    BASOPHILS 0 0 - 1 %    IMMATURE GRANULOCYTES 0 0.0 - 0.5 %    ABS. NEUTROPHILS 7.2 1.8 - 8.0 K/UL    ABS. LYMPHOCYTES 0.8 0.8 - 3.5 K/UL    ABS. MONOCYTES 0.4 0.0 - 1.0 K/UL    ABS. EOSINOPHILS 0.0 0.0 - 0.4 K/UL    ABS. BASOPHILS 0.0 0.0 - 0.1 K/UL    ABS. IMM. GRANS. 0.0 0.00 - 0.04 K/UL    DF SMEAR SCANNED      PLATELET COMMENTS Large Platelets      RBC COMMENTS NORMOCYTIC, NORMOCHROMIC     METABOLIC PANEL, BASIC    Collection Time: 12/15/18  3:19 AM   Result Value Ref Range    Sodium 139 136 - 145 mmol/L    Potassium 4.0 3.5 - 5.1 mmol/L    Chloride 109 (H) 97 - 108 mmol/L    CO2 26 21 - 32 mmol/L    Anion gap 4 (L) 5 - 15 mmol/L    Glucose 104 (H) 65 - 100 mg/dL    BUN 9 6 - 20 MG/DL    Creatinine 0.73 0.55 - 1.02 MG/DL    BUN/Creatinine ratio 12 12 - 20      GFR est AA >60 >60 ml/min/1.73m2    GFR est non-AA >60 >60 ml/min/1.73m2    Calcium 7.7 (L) 8.5 - 10.1 MG/DL   MAGNESIUM    Collection Time: 12/15/18  3:19 AM   Result Value Ref Range    Magnesium 2.3 1.6 - 2.4 mg/dL   PHOSPHORUS    Collection Time: 12/15/18  3:19 AM   Result Value Ref Range    Phosphorus 2.1 (L) 2.6 - 4.7 MG/DL           Assessment:     Principal Problem:    Rectal cancer metastasized to intrapelvic lymph node (HCC) (7/17/2018)    Active Problems:    Hyperthyroidism (12/13/2018)      Obesity (BMI 30.0-34.9) (12/13/2018)        2 Days Post-Op s/p hand-assisted laparoscopic low anterior resection, mobilization of the splenic flexure, creation of colonic J-pouch, coloanal anastomosis, and creation of loop ileostomy.     Hemodynamically stable. Hemoglobin acceptable. Urine output adequate. Creatinine normal.    Hypophosphatemic. GI function is beginning to return.         Plan:   Begin low fiber diet. Replace phosphorus. Remove Acosta catheter. Discontinue telemetry. Ambulate. Physical therapy.   Incentive spirometer. Ostomy education. Transfer to floor.

## 2018-12-15 NOTE — OP NOTES
[address] [place] [address] AURELIANO Covington  MR#: [id number]  : 1960  ACCOUNT #: [de-identified]     DATE OF SERVICE: 2018    PREOPERATIVE DIAGNOSIS:  Rectal cancer. POSTOPERATIVE DIAGNOSIS:  Rectal cancer. PROCEDURES PERFORMED:  Hand-assisted laparoscopic low anterior resection, mobilization of the splenic flexure, creation of colonic J-pouch, coloanal anastomosis, and creation of loop ileostomy. SURGEON:  Cadence Gates MD    ASSISTANTS:    1. [address] [place] [address]   2. Morrison Addis      ANESTHESIA:  General endotracheal.    SPECIMENS REMOVED:  1. Rectum and sigmoid colon. 2.  Additional proximal margin. 3.  Anastomotic donuts. TUBES AND DRAINS:  10 mm Naveed-Bella drain in the pelvis. ESTIMATED BLOOD LOSS:  250 mL. CRYSTALLOID:  3700 mL. ALBUMIN:  750 mL. URINE OUTPUT:  555 mL. COMPLICATIONS:  None apparent. IMPLANTS:  None. INDICATION FOR PROCEDURE:  The patient is a 41-year-old female who underwent a colonoscopy on 2018 to evaluate rectal bleeding and a change in bowel pattern. The procedure was performed by Candice Mace MD, and it revealed a distal rectal mass. The mass was biopsied, and the diagnosis of adenocarcinoma was confirmed. Pretreatment staging included a CT scan of the chest, abdomen, and pelvis performed on 2018 and an endoscopic rectal ultrasound performed by Dr. Donaldo Muñiz on 2018. Based on these studies, the disease was staged as [id number] (stage III). The patient then received neoadjuvant therapy with Xeloda and 5040 cGy of external beam radiation. She received her final radiation dose on 2018, and examination in the office on 2018 revealed that the tumor had responded to the treatment but that it was still visible and palpable with a distal margin located posteriorly at 4-5 cm from the anal verge.   She has continued to move her bowels up to 3 times per day, and she denies currently having any bleeding or experiencing any symptoms of fecal incontinence. She appeared to have recovered sufficiently from the chemoradiation therapy, and resection of the residual disease was now indicated. Because of the low location within the rectum, it was explained that obtaining an adequate distal margin would most likely require an ultra-low anterior resection or an abdominoperineal resection. It was explained that an intraoperative decision would be made between these two procedures and that while the former would include the creation of a temporary loop ileostomy the latter would require a permanent colostomy. The risks of surgery were discussed and explained in detail, and the patient agreed to proceed. OPERATIVE FINDINGS:  There was an ulcerated fibrotic area palpable in the posterior rectum with the distal margin at approximately 5 cm from the anal verge. There was no evidence of metastatic disease. Both ovaries were normal.  There was a large uterine mass consistent with a fibroid (leiomyoma). The small intestinal and colonic mesentery was thickened by adipose tissue and relatively short. The distal ileum was also thickened, possibly secondary to exposure to radiation. The appendix appeared to be normal.    DESCRIPTION OF PROCEDURE:  After informed consent was obtained, the patient was taken to the operating room where standard monitoring devices were attached and adequate general endotracheal anesthesia was induced. She was then positioned in lithotomy with the lower extremities fitted with pneumatic compression stockings and supported by the padded hydraulic Jay stirrups. The perineum was prepped and draped in the usual sterile fashion, then Itzel Aguilar MD performed cystoscopy and placement of bilateral ureteral stents and a Acosta catheter.   The urologic procedure had been requested in order to facilitate intraoperative identification of the ureters, and once it was completed, digital rectal examination was performed. The location of the residual disease was carefully assessed, and it was decided that it still might be possible to resect with a negative margin without having to perform the abdominoperineal resection. A 3-way Acosta catheter with a 30 mL balloon was inserted into the rectum and used to copiously irrigate the rectum and distal colon with several hundred milliliters of sterile water followed by a Betadine solution. The catheter was then left in place to gravity drainage until later in the case when its removal was required. A doubled gel pad was placed beneath the sacrum. Mastisol and tape were applied to retract the buttocks bilaterally. The lower extremities were repositioned into a low lithotomy and both upper extremities were tucked. An orogastric tube was inserted by the anesthetist.  Two grams of cefotetan were administered parenterally. The patient had also undergone mechanical and antibiotic bowel preparation. She had been marked in the preoperative holding area for both a colostomy and an ileostomy. The abdomen and perineum were prepped and draped in usual sterile fashion. 0.5% Bupivacaine with epinephrine was infiltrated prior to making skin incisions. The abdomen was entered through a periumbilical midline incision that was extended through the subcutaneous tissues and the linea alba. There was some scarring at the umbilicus secondary to a previous laparoscopic cholecystectomy. The abdominal cavity was opened, and minor adhesions to the anterior abdominal wall were taken down. The Gelport was inserted, and it would serve as both wound retractor and wound protector. Through the Gelport, a 12 mm port was inserted. Pneumoperitoneum was achieved, and the 5 mm, 30-degree laparoscope was inserted through the port. The abdomen was then explored visually. Next, under direct vision, a 5 mm trocar was inserted in the suprapubic midline. The laparoscope was then moved to the suprapubic site. The nondominant hand was inserted through the [address]. A 12 mm trocar was then inserted in the right lower quadrant. Through this trocar, the articulating EnSeal would be deployed and that device would be used for most of the intracorporeal dissection. The abdomen was explored visually and by palpation. The findings were as noted above. The dissection began at the sacral promontory where the mesentery was opened. Dissection was then carried proximally to isolate the inferior mesenteric vessels, which were separately divided using two deployments of the Signia stapling device with the 45 mm tan cartridge. Once this blood supply had been controlled, the mobilization of the distal sigmoid and the rectum itself were begun. Both ureters were repeatedly identified and carefully avoided. The left ureter in particular was noted to be rather small in diameter. The circumferential mobilization of the rectum was performed using the articulating EnSeal with the posterior dissection being performed in the relatively avascular presacral plane. As the dissection was carried down, particularly anteriorly, the uterus and its large appendage became problematic in terms of exposure. In order to help keep the uterus out of the field, a 0 Vicryl suture was placed through a portion of the fibroid tumor. The two ends, after the needle had been removed, were then brought through the abdominal wall in the left lower quadrant using the Endo Close device to pull them through. The suture ends were then tied around a bolster created using a Ray-Jonah and in this way, the uterus was pulled toward the anterior abdominal wall and mostly removed from the field of view in the mid to lower pelvis. Circumferential rectal dissection continued. As it proceded, of course, the exposure became more and more difficult.   Great care was taken to avoid penetrating the posterior wall of the vagina. The rectal catheter was removed. The dissection continued and, eventually, a doubly gloved hand was used to palpate the residual tumor by insertion of the finger into the anus. This helped to assess how much more distal dissection was required. The dissection continued until the pelvic floor was reached and it seemed that no further progress could be made without going to the perineum. The residual mass was assessed by palpation and it was believed that it would be possible to divide the rectum distal to the mass in a way that may or may not achieve the required distal margin, but that would not compromise the oncologic results of the operation by cutting through the tumor. The distal rectum was then divided in an essentially anterior-to-posterior direction using the linear stapling device with a 60 mm purple cartridge followed by a firing of the 30 mm purple cartridge. Once this had been done, the left colon was further mobilized using articulating EnSeal.  The sigmoid colon and rectum were then extracorporealized through the Gelport and examined. A point was chosen for proximal division of the colon based on the blood supply. This area was prepared for division. The Signia stapling device with a 60 mm purple cartridge was then used to divide the bowel. This completed the resection of the specimen, and the specimen was then taken to the back table and carefully opened. Examination revealed what appeared to be a close, but adequate distal margin; however, because of the critical nature of this distance and the necessity to convert to the abdominoperineal resection if it were inadequate, the on-call pathologist was contacted and asked to return to the hospital to examine the specimen. The specimen was therefore sent to the pathology department and, while the pathologic examination was being performed, additional mobilization of the colon was also being performed.     The splenic flexure was completely mobilized. The omentum was dissected off of most of the transverse colon and the proximal descending colon. The left colon was reflected medially and inferiorly. The length of the colon was assessed, and it appeared to be sufficient for the creation of a straight coloanal anastomosis. It was hoped, however, that if the abdominoperineal resection did not need to be performed, perhaps the patient could be given a colonic J-pouch to improve her functional results. The length of the colon was assessed for that purpose and also thought to be adequate with some additional mobilization perhaps being necessary. Nothing further could be done until the pathologic report was given. The pathologist called into the room and informed me that the gross margin appeared to be adequate, that a frozen section examination of the distal margin was negative, and that even the residual tumor could be mostly fibrotic. With this information, it was decided that the creation of anastomosis would be appropriate. The cut end of the colon was therefore further prepared by resecting another 1-1.5 cm that had been preserved earlier in case the end-to-end anastomosis was going to be performed. This dissection was performed using the 60 mm purple cartridge for the Signia stapling device, and the specimen was labeled additional proximal margin. The colon was folded on itself in a \"J\" shape with each limb being approximately 7.5 cm in length. At the apex of the J, the bowel was opened using the electrocautery. Retained contents were evacuated using suction, then a #25 EEA stapling device was brought onto the field. The anvil was dipped in Betadine and inserted into the colonic lumen. A 2-0 Prolene pursestring suture was then placed around it to secure it, and the inside of the shaft of the anvil was irrigated with Betadine. Some additional preparation of the bowel wall around the anvil was performed.   This preparation included  some of the mesenteric fat from the bowel wall itself. This was done with great care to avoid compromising the blood supply. The bowel was returned to the abdominal cavity and pneumoperitoneum was restored. The length was assessed and appeared to be acceptable. The assistant went to the perineum and, after gently dilating the anus, inserted the #25 EEA sizer and advanced it to the staple line of the distal margin. Next, the stapler itself was lubricated and carefully inserted. The spike was brought out through what appeared to be an appropriate point and then the spike and the anvil shaft were engaged. The stapler was slowly closed. The colonic J-pouch was guided down and kept in a non-twisted orientation. Care was also taken to avoid the incorporation of any extraneous tissues in the anastomosis. Once the stapler was closed, it was fired, then opened and removed. Inspection of the anastomotic donuts revealed both to be intact and of adequate thickness. Leak testing was performed by carefully inserting the rigid proctosigmoidoscope into the anal canal and then inflating the bowel using the scope. The bowel was gently occluded proximal to the pouch and it was visualized continuously. It became inflated and it seemed to hold the air without bubbling or uncontrolled decompression. It should be noted, however, that the bulb for the rigid proctosigmoidoscope was defective and did make it more difficult for the assistant to inflate the bowel. Nevertheless, with the pouch and anastomosis having passed the leak test, the scope was withdrawn. The pelvis was irrigated with saline and the irrigant was mostly evacuated. A flat 10 mm Naveed-Bella drain was placed in the pelvis adjacent to the pouch and brought out through a left lower quadrant stab incision and then secured to the skin with a 3-0 nylon suture.       The abdominal cavity was carefully examined laparoscopically, and hemostasis was judged to be acceptable. There was some oozing from a group of blood vessels near the proximal jejunum and here some Hemoclips were applied. This seemed to achieve hemostasis. Next, the small intestine was examined through the [address] [place] [address]. The terminal ileum was identified along with the cecum, and a point was selected for use for the loop ileostomy, which had been planned for temporary fecal diversion if an anastomosis was created. Unfortunately, because of the patient's body habitus, the creation of the ileostomy was quite difficult. The ostomy site isidoro was well above the level of the umbilicus, the abdominal wall was thick because of adipose tissue, and the small intestinal mesentery was thick and short, also because of adiposity. At the site marked preoperatively, a circular skin incision was made. The incision was extended through the subcutaneous fat and a plug of fat and skin was removed. Additional dissection was performed down to the level of the anterior rectus sheath, which was incised with the electrocautery. The rectus muscle fibers were bluntly  using retractors and then the posterior sheath was incised with electrocautery. Gradually, the opening was made larger and larger in order to permit the passage of the ileal loop, with its thickened wall and thick mesentery, through it. The ileal loop was suspended over an 18-Romansh red rubber catheter, which would function as the ostomy \"jaime. \"  The afferent limb was positioned medially and superiorly. The efferent limb was positioned laterally and inferiorly. The trocars were removed as was the Gelport. In fact, it was necessary to remove the Gelport in order to get the bowel through the abdominal wall. One regular sized sheet of Seprafilm was cut into two pieces and both pieces were placed on the viscera with none directly on the colonic pouch.   The traction suture on the uterus had been removed earlier, and the EnSeal had been used to cauterize the needle holes in the uterine fibroid. Next, per protocol, gown and gloves were changed and the dedicated closing instrument set was opened. The patient was redraped. The midline fascial closure in the periumbilical wound was performed using two running #1 PDS sutures. The subcutaneous portions of all three wounds were irrigated with saline. The subcutaneous adipose tissue in the periumbilical midline wound was reapproximated using 2-0 Vicryl sutures. Skin closure at all three wounds was performed using subcuticular 4-0 Monocryl sutures and Exofin. Once the Exofin dried, the ileostomy was matured. This was done with difficulty due to its lack of mobility. It was not, unfortunately, well budded, but I was not able to get any more length. The maturation was performed using multiple 3-0 Vicryl sutures after the bowel was opened. The 18-Chinese red rubber catheter was secured using 3-0 nylon sutures. An ostomy appliance was obtained and cut to the appropriate size then put in place. A drain dressing was applied. The ureteral catheters were removed and the orogastric tube was also removed. The Acosta catheter was left in place. There were no apparent complications, and the patient appeared to have tolerated the procedure well. At its conclusion, she was extubated and transported in stable condition to the recovery room. All sponge, needle and instrument counts were correct.           Toma Garcia MD       PG / Marina Gallardo  D: 12/14/2018 23:04     T: 12/15/2018 10:26  JOB #: [id number]  CC: Joseph Stafford MD  CC: Kristin Stephenson MD  CC: Raf Darnell MD  CC: Toma Garcia MD  CC: Sulema Craig MD

## 2018-12-16 LAB
ANION GAP SERPL CALC-SCNC: 7 MMOL/L (ref 5–15)
BASOPHILS # BLD: 0 K/UL (ref 0–0.1)
BASOPHILS NFR BLD: 0 % (ref 0–1)
BUN SERPL-MCNC: 8 MG/DL (ref 6–20)
BUN/CREAT SERPL: 11 (ref 12–20)
CALCIUM SERPL-MCNC: 8.1 MG/DL (ref 8.5–10.1)
CHLORIDE SERPL-SCNC: 107 MMOL/L (ref 97–108)
CO2 SERPL-SCNC: 26 MMOL/L (ref 21–32)
CREAT SERPL-MCNC: 0.7 MG/DL (ref 0.55–1.02)
DIFFERENTIAL METHOD BLD: ABNORMAL
EOSINOPHIL # BLD: 0.2 K/UL (ref 0–0.4)
EOSINOPHIL NFR BLD: 3 % (ref 0–7)
ERYTHROCYTE [DISTWIDTH] IN BLOOD BY AUTOMATED COUNT: 14.9 % (ref 11.5–14.5)
GLUCOSE SERPL-MCNC: 96 MG/DL (ref 65–100)
HCT VFR BLD AUTO: 35.2 % (ref 35–47)
HGB BLD-MCNC: 10.3 G/DL (ref 11.5–16)
IMM GRANULOCYTES # BLD: 0 K/UL (ref 0–0.04)
IMM GRANULOCYTES NFR BLD AUTO: 1 % (ref 0–0.5)
LYMPHOCYTES # BLD: 0.8 K/UL (ref 0.8–3.5)
LYMPHOCYTES NFR BLD: 11 % (ref 12–49)
MCH RBC QN AUTO: 26.6 PG (ref 26–34)
MCHC RBC AUTO-ENTMCNC: 29.3 G/DL (ref 30–36.5)
MCV RBC AUTO: 91 FL (ref 80–99)
MONOCYTES # BLD: 0.4 K/UL (ref 0–1)
MONOCYTES NFR BLD: 5 % (ref 5–13)
NEUTS SEG # BLD: 6.2 K/UL (ref 1.8–8)
NEUTS SEG NFR BLD: 81 % (ref 32–75)
NRBC # BLD: 0 K/UL (ref 0–0.01)
NRBC BLD-RTO: 0 PER 100 WBC
PHOSPHATE SERPL-MCNC: 2.8 MG/DL (ref 2.6–4.7)
PLATELET # BLD AUTO: 160 K/UL (ref 150–400)
PMV BLD AUTO: 10.7 FL (ref 8.9–12.9)
POTASSIUM SERPL-SCNC: 3.9 MMOL/L (ref 3.5–5.1)
RBC # BLD AUTO: 3.87 M/UL (ref 3.8–5.2)
SODIUM SERPL-SCNC: 140 MMOL/L (ref 136–145)
WBC # BLD AUTO: 7.7 K/UL (ref 3.6–11)

## 2018-12-16 PROCEDURE — 80048 BASIC METABOLIC PNL TOTAL CA: CPT

## 2018-12-16 PROCEDURE — 36415 COLL VENOUS BLD VENIPUNCTURE: CPT

## 2018-12-16 PROCEDURE — 84100 ASSAY OF PHOSPHORUS: CPT

## 2018-12-16 PROCEDURE — 85025 COMPLETE CBC W/AUTO DIFF WBC: CPT

## 2018-12-16 PROCEDURE — 74011250637 HC RX REV CODE- 250/637: Performed by: COLON & RECTAL SURGERY

## 2018-12-16 PROCEDURE — 65270000029 HC RM PRIVATE

## 2018-12-16 PROCEDURE — 74011250636 HC RX REV CODE- 250/636: Performed by: COLON & RECTAL SURGERY

## 2018-12-16 RX ADMIN — ALVIMOPAN 12 MG: 12 CAPSULE ORAL at 18:14

## 2018-12-16 RX ADMIN — ENOXAPARIN SODIUM 40 MG: 40 INJECTION SUBCUTANEOUS at 10:24

## 2018-12-16 RX ADMIN — Medication 10 ML: at 21:32

## 2018-12-16 RX ADMIN — CELECOXIB 100 MG: 100 CAPSULE ORAL at 18:14

## 2018-12-16 RX ADMIN — ACETAMINOPHEN 1000 MG: 500 TABLET ORAL at 19:50

## 2018-12-16 RX ADMIN — ALVIMOPAN 12 MG: 12 CAPSULE ORAL at 10:24

## 2018-12-16 RX ADMIN — ACETAMINOPHEN 1000 MG: 500 TABLET ORAL at 03:10

## 2018-12-16 RX ADMIN — METHIMAZOLE 5 MG: 5 TABLET ORAL at 21:31

## 2018-12-16 RX ADMIN — ACETAMINOPHEN 1000 MG: 500 TABLET ORAL at 14:46

## 2018-12-16 RX ADMIN — ONDANSETRON 4 MG: 4 TABLET, ORALLY DISINTEGRATING ORAL at 07:33

## 2018-12-16 RX ADMIN — Medication 10 ML: at 14:47

## 2018-12-16 RX ADMIN — CELECOXIB 100 MG: 100 CAPSULE ORAL at 10:24

## 2018-12-16 NOTE — PROGRESS NOTES
General Daily Progress Note    Admission Date: 12/13/2018  Hospital Day 4  Post-Op Day 3  Subjective:   Doesn't feel as well today as she did yesterday. Tired and a bit nauseated. She thinks the nausea might be related to oral phosphorus replacement. Voiding. Ambulated in the hu only once yesterday. Good pain control. Objective:     Patient Vitals for the past 24 hrs:   BP Temp Pulse Resp SpO2 Weight   12/16/18 0831 129/74 98.1 °F (36.7 °C) 75 16 96 %    12/16/18 0827 141/75 98.4 °F (36.9 °C) 82 16 99 %    12/16/18 0638      96.6 kg (213 lb)   12/16/18 0309 128/83 97.8 °F (36.6 °C) 79 16 97 %    12/15/18 2300 113/66 98.1 °F (36.7 °C) 85 16 95 %    12/15/18 1934 117/77 97.9 °F (36.6 °C) 90 16 95 %    12/15/18 1534 114/75 98.1 °F (36.7 °C) 86 16 97 %    12/15/18 1259 (!) 136/92 98 °F (36.7 °C) 91 16 97 %    12/15/18 1147 134/78 97.6 °F (36.4 °C) 85 16 99 %      12/16 0701 - 12/16 1900  In: -   Out: 130 [Drains:30]  12/14 1901 - 12/16 0700  In: 813.5 [P.O.:575; I.V.:238.5]  Out: 6421 [Urine:3300; Drains:125]      Physical Examination:  General Appearance:  No distress, but looks tired. Chest:  Lungs clear to auscultation. Heart:  Normal rate and regular rhythm. Abdomen:  Incisions clean, dry, intact, and with ecchymosis but no abnormal erythema.  Ileostomy edematous and a bit dusky, but viable.  DIANNE drainage serosanguinous.   Extremities:  Compression stockings and pneumatic compression stockings in use.                      Data Review   Recent Results (from the past 24 hour(s))   CBC WITH AUTOMATED DIFF    Collection Time: 12/16/18  3:12 AM   Result Value Ref Range    WBC 7.7 3.6 - 11.0 K/uL    RBC 3.87 3.80 - 5.20 M/uL    HGB 10.3 (L) 11.5 - 16.0 g/dL    HCT 35.2 35.0 - 47.0 %    MCV 91.0 80.0 - 99.0 FL    MCH 26.6 26.0 - 34.0 PG    MCHC 29.3 (L) 30.0 - 36.5 g/dL    RDW 14.9 (H) 11.5 - 14.5 %    PLATELET 288 015 - 775 K/uL    MPV 10.7 8.9 - 12.9 FL    NRBC 0.0 0  WBC    ABSOLUTE NRBC 0. 00 0.00 - 0.01 K/uL    NEUTROPHILS 81 (H) 32 - 75 %    LYMPHOCYTES 11 (L) 12 - 49 %    MONOCYTES 5 5 - 13 %    EOSINOPHILS 3 0 - 7 %    BASOPHILS 0 0 - 1 %    IMMATURE GRANULOCYTES 1 (H) 0.0 - 0.5 %    ABS. NEUTROPHILS 6.2 1.8 - 8.0 K/UL    ABS. LYMPHOCYTES 0.8 0.8 - 3.5 K/UL    ABS. MONOCYTES 0.4 0.0 - 1.0 K/UL    ABS. EOSINOPHILS 0.2 0.0 - 0.4 K/UL    ABS. BASOPHILS 0.0 0.0 - 0.1 K/UL    ABS. IMM. GRANS. 0.0 0.00 - 0.04 K/UL    DF AUTOMATED     METABOLIC PANEL, BASIC    Collection Time: 12/16/18  3:12 AM   Result Value Ref Range    Sodium 140 136 - 145 mmol/L    Potassium 3.9 3.5 - 5.1 mmol/L    Chloride 107 97 - 108 mmol/L    CO2 26 21 - 32 mmol/L    Anion gap 7 5 - 15 mmol/L    Glucose 96 65 - 100 mg/dL    BUN 8 6 - 20 MG/DL    Creatinine 0.70 0.55 - 1.02 MG/DL    BUN/Creatinine ratio 11 (L) 12 - 20      GFR est AA >60 >60 ml/min/1.73m2    GFR est non-AA >60 >60 ml/min/1.73m2    Calcium 8.1 (L) 8.5 - 10.1 MG/DL   PHOSPHORUS    Collection Time: 12/16/18  3:12 AM   Result Value Ref Range    Phosphorus 2.8 2.6 - 4.7 MG/DL           Assessment:     Principal Problem:    Rectal cancer metastasized to intrapelvic lymph node ([address] (7/17/2018)    Active Problems:    Hyperthyroidism (12/13/2018)      Obesity (BMI 30.0-34.9) (12/13/2018)        3 Days Post-Op s/p hand-assisted laparoscopic low anterior resection, mobilization of the splenic flexure, creation of colonic J-pouch, coloanal anastomosis, and creation of loop ileostomy. Transferred from [facility] to [facility] on 12/15/2018.     Hemodynamically stable. Hemoglobin stable.     Urine output adequate. Creatinine normal.     Hypophosphatemia corrected.     GI function is beginning to return, but the patient might have overdone it with the food yesterday. Oral potassium phosphate might also be contributing to the nausea. Needs to walk more. Plan:   Continue low fiber diet with caution. Ambulate. Physical therapy. Incentive spirometer. Ostomy education.

## 2018-12-16 NOTE — PROGRESS NOTES
Bedside shift change report given to [address] (oncoming nurse) by Patricio Hager (offgoing nurse). Report included the following information SBAR, Kardex, Intake/Output and MAR.

## 2018-12-17 LAB
ANION GAP SERPL CALC-SCNC: 6 MMOL/L (ref 5–15)
BASOPHILS # BLD: 0 K/UL (ref 0–0.1)
BASOPHILS NFR BLD: 0 % (ref 0–1)
BUN SERPL-MCNC: 9 MG/DL (ref 6–20)
BUN/CREAT SERPL: 14 (ref 12–20)
CALCIUM SERPL-MCNC: 8.2 MG/DL (ref 8.5–10.1)
CHLORIDE SERPL-SCNC: 109 MMOL/L (ref 97–108)
CO2 SERPL-SCNC: 25 MMOL/L (ref 21–32)
CREAT SERPL-MCNC: 0.66 MG/DL (ref 0.55–1.02)
DIFFERENTIAL METHOD BLD: ABNORMAL
EOSINOPHIL # BLD: 0.3 K/UL (ref 0–0.4)
EOSINOPHIL NFR BLD: 6 % (ref 0–7)
ERYTHROCYTE [DISTWIDTH] IN BLOOD BY AUTOMATED COUNT: 14.6 % (ref 11.5–14.5)
GLUCOSE SERPL-MCNC: 90 MG/DL (ref 65–100)
HCT VFR BLD AUTO: 34 % (ref 35–47)
HGB BLD-MCNC: 10.5 G/DL (ref 11.5–16)
IMM GRANULOCYTES # BLD: 0 K/UL (ref 0–0.04)
IMM GRANULOCYTES NFR BLD AUTO: 1 % (ref 0–0.5)
LYMPHOCYTES # BLD: 0.8 K/UL (ref 0.8–3.5)
LYMPHOCYTES NFR BLD: 14 % (ref 12–49)
MAGNESIUM SERPL-MCNC: 2 MG/DL (ref 1.6–2.4)
MCH RBC QN AUTO: 27.8 PG (ref 26–34)
MCHC RBC AUTO-ENTMCNC: 30.9 G/DL (ref 30–36.5)
MCV RBC AUTO: 89.9 FL (ref 80–99)
MONOCYTES # BLD: 0.3 K/UL (ref 0–1)
MONOCYTES NFR BLD: 5 % (ref 5–13)
NEUTS SEG # BLD: 4.3 K/UL (ref 1.8–8)
NEUTS SEG NFR BLD: 75 % (ref 32–75)
NRBC # BLD: 0 K/UL (ref 0–0.01)
NRBC BLD-RTO: 0 PER 100 WBC
PHOSPHATE SERPL-MCNC: 3.6 MG/DL (ref 2.6–4.7)
PLATELET # BLD AUTO: 178 K/UL (ref 150–400)
PMV BLD AUTO: 10.3 FL (ref 8.9–12.9)
POTASSIUM SERPL-SCNC: 4.2 MMOL/L (ref 3.5–5.1)
RBC # BLD AUTO: 3.78 M/UL (ref 3.8–5.2)
SODIUM SERPL-SCNC: 140 MMOL/L (ref 136–145)
WBC # BLD AUTO: 5.7 K/UL (ref 3.6–11)

## 2018-12-17 PROCEDURE — 77030011641 HC PASTE OST ADH BMS -A

## 2018-12-17 PROCEDURE — 65270000029 HC RM PRIVATE

## 2018-12-17 PROCEDURE — 80048 BASIC METABOLIC PNL TOTAL CA: CPT

## 2018-12-17 PROCEDURE — 85025 COMPLETE CBC W/AUTO DIFF WBC: CPT

## 2018-12-17 PROCEDURE — 36415 COLL VENOUS BLD VENIPUNCTURE: CPT

## 2018-12-17 PROCEDURE — 74011250636 HC RX REV CODE- 250/636: Performed by: COLON & RECTAL SURGERY

## 2018-12-17 PROCEDURE — 84100 ASSAY OF PHOSPHORUS: CPT

## 2018-12-17 PROCEDURE — 74011250637 HC RX REV CODE- 250/637: Performed by: COLON & RECTAL SURGERY

## 2018-12-17 PROCEDURE — 83735 ASSAY OF MAGNESIUM: CPT

## 2018-12-17 PROCEDURE — 97116 GAIT TRAINING THERAPY: CPT

## 2018-12-17 RX ADMIN — Medication 10 ML: at 07:03

## 2018-12-17 RX ADMIN — ALVIMOPAN 12 MG: 12 CAPSULE ORAL at 09:20

## 2018-12-17 RX ADMIN — ACETAMINOPHEN 1000 MG: 500 TABLET ORAL at 02:38

## 2018-12-17 RX ADMIN — Medication 10 ML: at 21:58

## 2018-12-17 RX ADMIN — Medication 10 ML: at 15:05

## 2018-12-17 RX ADMIN — ALVIMOPAN 12 MG: 12 CAPSULE ORAL at 19:07

## 2018-12-17 RX ADMIN — METHIMAZOLE 5 MG: 5 TABLET ORAL at 21:58

## 2018-12-17 RX ADMIN — ENOXAPARIN SODIUM 40 MG: 40 INJECTION SUBCUTANEOUS at 09:20

## 2018-12-17 RX ADMIN — CELECOXIB 100 MG: 100 CAPSULE ORAL at 19:07

## 2018-12-17 RX ADMIN — ACETAMINOPHEN 1000 MG: 500 TABLET ORAL at 21:58

## 2018-12-17 RX ADMIN — ACETAMINOPHEN 1000 MG: 500 TABLET ORAL at 07:03

## 2018-12-17 RX ADMIN — ACETAMINOPHEN 1000 MG: 500 TABLET ORAL at 15:00

## 2018-12-17 RX ADMIN — CELECOXIB 100 MG: 100 CAPSULE ORAL at 09:20

## 2018-12-17 NOTE — PROGRESS NOTES
Bedside shift change report given to Vanessa (oncoming nurse) by Nicole Swanson (offgoing nurse). Report included the following information SBAR, Kardex and MAR.

## 2018-12-17 NOTE — PROGRESS NOTES
Problem: Mobility Impaired (Adult and Pediatric)  Goal: *Acute Goals and Plan of Care (Insert Text)  Physical Therapy Goals  Initiated 12/14/2018  1. Patient will move from supine to sit and sit to supine , scoot up and down and roll side to side in bed with independence within 7 day(s). 2.  Patient will transfer from bed to chair and chair to bed with independence using the least restrictive device within 7 day(s). 3.  Patient will perform sit to stand with independence within 7 day(s). 4.  Patient will ambulate with independence for 300 feet with the least restrictive device within 7 day(s). 5.  Patient will ascend/descend 14 stairs with one handrail(s) with modified independence within 7 day(s). physical Therapy TREATMENT  Patient: Wilman Murrieta (89 y.o. female)  Date: 12/17/2018  Diagnosis: RECTAL CANCER  Rectal cancer ([facility] [address] Rectal cancer metastasized to intrapelvic lymph node (HCC)  Procedure(s) (LRB):  CYSTOSCOPY WITH PLACEMENT BILATERAL URETERAL CATHETERS, HAND ASSISTED LAPAROSCOPIC LOW ANTERIOR RESECTION, MOBILIZATION OF SPLENIC FLECTURE,  COLONIC J POUCH, COLOANAL ANASTOMOSIS AND LOOP ILEOSTOMY (N/A)  CYSTOSCOPY IINSERTION OF BILATERAL URETERAL STENTS (Bilateral) 4 Days Post-Op  Precautions:    Chart, physical therapy assessment, plan of care and goals were reviewed. ASSESSMENT:  Chart reviewed and RN approved patient for mobility. Patient received lying in bed agreeable to work with therapy. Patient transferred supine to sitting EOB using log roll technique with Yang using bed rail. Patient transferred sit<>stand with supervision and ambulated to bathroom to void independently. Patient ambulated 200ft with SBA demonstrating slow gait, but steady with no LOB. Patient reported fatigue during ambulation. Patient returned to bed and left with all needs met and  at bedside. Patient cleared for discharge from a mobility standpoint once medically stable.   She does not have any PT d/c needs.      Progression toward goals:  [x]    Improving appropriately and progressing toward goals  []    Improving slowly and progressing toward goals  []    Not making progress toward goals and plan of care will be adjusted     PLAN:  Patient continues to benefit from skilled intervention to address the above impairments. Continue treatment per established plan of care. Discharge Recommendations:  None  Further Equipment Recommendations for Discharge:  None     SUBJECTIVE:   Patient stated I am getting in and out of bed much better.     OBJECTIVE DATA SUMMARY:   Critical Behavior:  Neurologic State: Alert  Orientation Level: Oriented X4  Cognition: Appropriate decision making, Appropriate for age attention/concentration, Appropriate safety awareness     Functional Mobility Training:  Bed Mobility:  Rolling: Modified independent  Supine to Sit: Modified independent  Sit to Supine: Modified independent           Transfers:  Sit to Stand: Stand-by assistance  Stand to Sit: Stand-by assistance                             Balance:  Sitting: Intact  Standing: Intact; Without support  Ambulation/Gait Training:  Distance (ft): 200 Feet (ft)  Assistive Device: Gait belt  Ambulation - Level of Assistance: Stand-by assistance        Gait Abnormalities: Antalgic;Decreased step clearance        Base of Support: Narrowed     Speed/Nelli: Slow;Shuffled  Step Length: Left shortened;Right shortened                    Stairs:              Neuro Re-Education:    Therapeutic Exercises:     Pain:  Pain Scale 1: Numeric (0 - 10)  Pain Intensity 1: 0              Activity Tolerance:   VSS, NAD  Please refer to the flowsheet for vital signs taken during this treatment.   After treatment:   []    Patient left in no apparent distress sitting up in chair  [x]    Patient left in no apparent distress in bed  [x]    Call bell left within reach  [x]    Nursing notified  [x]    Caregiver present  []    Bed alarm activated    COMMUNICATION/COLLABORATION:   The patients plan of care was discussed with: Registered Nurse    Rashel Kramer, PT, DPT   Time Calculation: 13 mins

## 2018-12-17 NOTE — PROGRESS NOTES
Specialty appointment has been scheduled with Dr. Sharon Godfrey for Friday, 12/21/18 at 10:30 a.m. Pending patient discharge.   Angeli Campuzano, Care Management Specialist.

## 2018-12-17 NOTE — PROGRESS NOTES
General Daily Progress Note    Admission Date: 12/13/2018  Hospital Day 5  Post-Op Day 4  Subjective:   Good pain control. No more nausea. Being cautious with the diet. Voiding well. Ambulating well. Objective:     Patient Vitals for the past 24 hrs:   BP Temp Pulse Resp SpO2 Weight   12/17/18 0811 131/75 97.3 °F (36.3 °C) 73 16 97 %    12/17/18 0656      95.9 kg (211 lb 6.4 oz)   12/17/18 0500 129/80 98 °F (36.7 °C) 80 16 95 %    12/16/18 2253 127/80 98.1 °F (36.7 °C) 79 14 97 %    12/16/18 1949 132/83 98 °F (36.7 °C) 84 20 96 %    12/16/18 1513 127/83 98 °F (36.7 °C) 82 16 95 %    12/16/18 1201 128/84 97.8 °F (36.6 °C) 81 16 94 %      12/17 0701 - 12/17 1900  In: -   Out: 60 [Drains:10]  12/15 1901 - 12/17 0700  In: 1300 [P.O.:1300]  Out: 8802 [Urine:2300; Drains:70]      Physical Examination:  General Appearance:  No distress. Abdomen:  Appropriately tender. Incisions clean, dry, intact, and with ecchymosis but no abnormal erythema.  Ileostomy viable.  DIANNE drainage serosanguinous. Extremities:  Compression stockings and pneumatic compression stockings in use.            Data Review   Recent Results (from the past 24 hour(s))   CBC WITH AUTOMATED DIFF    Collection Time: 12/17/18  3:23 AM   Result Value Ref Range    WBC 5.7 3.6 - 11.0 K/uL    RBC 3.78 (L) 3.80 - 5.20 M/uL    HGB 10.5 (L) 11.5 - 16.0 g/dL    HCT 34.0 (L) 35.0 - 47.0 %    MCV 89.9 80.0 - 99.0 FL    MCH 27.8 26.0 - 34.0 PG    MCHC 30.9 30.0 - 36.5 g/dL    RDW 14.6 (H) 11.5 - 14.5 %    PLATELET 499 131 - 168 K/uL    MPV 10.3 8.9 - 12.9 FL    NRBC 0.0 0  WBC    ABSOLUTE NRBC 0.00 0.00 - 0.01 K/uL    NEUTROPHILS 75 32 - 75 %    LYMPHOCYTES 14 12 - 49 %    MONOCYTES 5 5 - 13 %    EOSINOPHILS 6 0 - 7 %    BASOPHILS 0 0 - 1 %    IMMATURE GRANULOCYTES 1 (H) 0.0 - 0.5 %    ABS. NEUTROPHILS 4.3 1.8 - 8.0 K/UL    ABS. LYMPHOCYTES 0.8 0.8 - 3.5 K/UL    ABS. MONOCYTES 0.3 0.0 - 1.0 K/UL    ABS. EOSINOPHILS 0.3 0.0 - 0.4 K/UL    ABS. BASOPHILS 0.0 0.0 - 0.1 K/UL    ABS. IMM. GRANS. 0.0 0.00 - 0.04 K/UL    DF AUTOMATED     METABOLIC PANEL, BASIC    Collection Time: 12/17/18  3:23 AM   Result Value Ref Range    Sodium 140 136 - 145 mmol/L    Potassium 4.2 3.5 - 5.1 mmol/L    Chloride 109 (H) 97 - 108 mmol/L    CO2 25 21 - 32 mmol/L    Anion gap 6 5 - 15 mmol/L    Glucose 90 65 - 100 mg/dL    BUN 9 6 - 20 MG/DL    Creatinine 0.66 0.55 - 1.02 MG/DL    BUN/Creatinine ratio 14 12 - 20      GFR est AA >60 >60 ml/min/1.73m2    GFR est non-AA >60 >60 ml/min/1.73m2    Calcium 8.2 (L) 8.5 - 10.1 MG/DL   MAGNESIUM    Collection Time: 12/17/18  3:23 AM   Result Value Ref Range    Magnesium 2.0 1.6 - 2.4 mg/dL   PHOSPHORUS    Collection Time: 12/17/18  3:23 AM   Result Value Ref Range    Phosphorus 3.6 2.6 - 4.7 MG/DL           Assessment:     Principal Problem:    Rectal cancer metastasized to intrapelvic lymph node (HCC) (7/17/2018)    Active Problems:    Hyperthyroidism (12/13/2018)      Obesity (BMI 30.0-34.9) (12/13/2018)        4 Days Post-Op s/p hand-assisted laparoscopic low anterior resection, mobilization of the splenic flexure, creation of colonic J-pouch, coloanal anastomosis, and creation of loop ileostomy.     Transferred from [facility] to [facility] on 12/15/2018.     Hemodynamically stable. Hemoglobin stable.     Urine output adequate. Creatinine normal.     GI function is returning. Ostomy education is needed. Pathology results are pending. Plan:   Continue low fiber diet as tolerated. Ambulate. Physical therapy. Incentive spirometer. Ostomy education. Probable discharge tomorrow with home health.

## 2018-12-17 NOTE — WOUND CARE
CWOCN Ostomy Progress Note:     Second postoperative visit. Type of Ostomy: diverting loop ileostomy with LAR and j-pouch formation   Location: [facility]  Date of Surgery: 12/13/18      Surgeon: Dr. Betty Grossman:  Luzmaria Poll removed, stoma and peristomal skin cleaned and assessed, new pouch prepared and applied. Findings:  Current appliance: Coloplast 1-piece with paste  Stoma size: ~1.25\"  Stoma color: red & moist  Characteristics: oval with red rubber cath for support  Peristomal skin: intact  Abdomen: soft  Output: brown liquid / positive flatus    Teaching/Subjects covered today:  Encouraged pt to review handout given to patient/family and ask questions regarding material on next ostomy nurse visit. - General anatomy and expectations of output  - Normal & abnormal stoma characteristics & changes over time  - Normal & abnormal peristomal characteristics   - When/how to clean stoma & peristomal skin  - When/how to empty pouch  - When/how to change appliance  - When to notify nurse/physician  - Dietary guidelines  - Manipulated/practiced with clean pouch and clip  - Reviewed ADL's (bathing, mattress cover, car pillow to protect from seatbelt, etc)  - Blockage s/s and electrolyte imbalance  - Pharmacy notification re: meds    Recommendations:  1. Empty appliance when 1/3 full and PRN. Encourage patient/family to notify nurse and  assist w/ pouch emptying to promote self-care. Change appliance twice weekly and PRN for leaking ASAP. DO NOT REINFORCE LEAKS to avoid skin breakdown. Transition of Care:  Ostomy nurse to return and reinforce teaching tomorrow. Supplies left in room.      Leatha Ceballos RN, BSN, [facility]  Certified Wound, Ostomy, Continence Nurse  office: [phone]  pager [phone] or [phone]

## 2018-12-17 NOTE — PROGRESS NOTES
Plan is continuing of low fiber diet and to ambulate. Ostomy teaching planned with possible discharge tomorrow home tomorrow. Patient is independent and lives with her spouse. MCKAY Khanna 68 to provide home health at discharge. Care Management Interventions  PCP Verified by CM:  Yes  Transition of Care Consult (CM Consult): [address]: No  Reason Outside [place]: Unable to staff case  Juanis Axon: No  Discharge Durable Medical Equipment: No  Physical Therapy Consult: Yes  Occupational Therapy Consult: No  Speech Therapy Consult: No  Current Support Network: Lives with Spouse  Plan discussed with Pt/Family/Caregiver: Yes  Freedom of Choice Offered: Yes  Discharge Location  Discharge Placement: Home with home health

## 2018-12-18 VITALS
RESPIRATION RATE: 16 BRPM | BODY MASS INDEX: 32.77 KG/M2 | HEIGHT: 67 IN | TEMPERATURE: 97.7 F | WEIGHT: 208.8 LBS | DIASTOLIC BLOOD PRESSURE: 85 MMHG | SYSTOLIC BLOOD PRESSURE: 128 MMHG | OXYGEN SATURATION: 97 % | HEART RATE: 80 BPM

## 2018-12-18 LAB
BASOPHILS # BLD: 0 K/UL (ref 0–0.1)
BASOPHILS NFR BLD: 0 % (ref 0–1)
DIFFERENTIAL METHOD BLD: ABNORMAL
EOSINOPHIL # BLD: 0.3 K/UL (ref 0–0.4)
EOSINOPHIL NFR BLD: 5 % (ref 0–7)
ERYTHROCYTE [DISTWIDTH] IN BLOOD BY AUTOMATED COUNT: 14.6 % (ref 11.5–14.5)
HCT VFR BLD AUTO: 32.9 % (ref 35–47)
HGB BLD-MCNC: 10.2 G/DL (ref 11.5–16)
IMM GRANULOCYTES # BLD: 0.1 K/UL (ref 0–0.04)
IMM GRANULOCYTES NFR BLD AUTO: 1 % (ref 0–0.5)
LYMPHOCYTES # BLD: 0.7 K/UL (ref 0.8–3.5)
LYMPHOCYTES NFR BLD: 11 % (ref 12–49)
MCH RBC QN AUTO: 27.4 PG (ref 26–34)
MCHC RBC AUTO-ENTMCNC: 31 G/DL (ref 30–36.5)
MCV RBC AUTO: 88.4 FL (ref 80–99)
MONOCYTES # BLD: 0.3 K/UL (ref 0–1)
MONOCYTES NFR BLD: 5 % (ref 5–13)
NEUTS SEG # BLD: 4.6 K/UL (ref 1.8–8)
NEUTS SEG NFR BLD: 78 % (ref 32–75)
NRBC # BLD: 0 K/UL (ref 0–0.01)
NRBC BLD-RTO: 0 PER 100 WBC
PLATELET # BLD AUTO: 200 K/UL (ref 150–400)
PMV BLD AUTO: 10.1 FL (ref 8.9–12.9)
RBC # BLD AUTO: 3.72 M/UL (ref 3.8–5.2)
RBC MORPH BLD: ABNORMAL
WBC # BLD AUTO: 6 K/UL (ref 3.6–11)

## 2018-12-18 PROCEDURE — 77030010541

## 2018-12-18 PROCEDURE — 74011250637 HC RX REV CODE- 250/637: Performed by: COLON & RECTAL SURGERY

## 2018-12-18 PROCEDURE — 77030011641 HC PASTE OST ADH BMS -A

## 2018-12-18 PROCEDURE — 90471 IMMUNIZATION ADMIN: CPT

## 2018-12-18 PROCEDURE — 90686 IIV4 VACC NO PRSV 0.5 ML IM: CPT | Performed by: COLON & RECTAL SURGERY

## 2018-12-18 PROCEDURE — 85025 COMPLETE CBC W/AUTO DIFF WBC: CPT

## 2018-12-18 PROCEDURE — 74011250636 HC RX REV CODE- 250/636: Performed by: COLON & RECTAL SURGERY

## 2018-12-18 PROCEDURE — 77030010520

## 2018-12-18 PROCEDURE — 36415 COLL VENOUS BLD VENIPUNCTURE: CPT

## 2018-12-18 RX ORDER — OXYCODONE HYDROCHLORIDE 5 MG/1
5 TABLET ORAL
Qty: 20 TAB | Refills: 0 | Status: ON HOLD | OUTPATIENT
Start: 2018-12-18 | End: 2019-04-01

## 2018-12-18 RX ADMIN — Medication 10 ML: at 07:44

## 2018-12-18 RX ADMIN — INFLUENZA VIRUS VACCINE 0.5 ML: 15; 15; 15; 15 SUSPENSION INTRAMUSCULAR at 11:10

## 2018-12-18 RX ADMIN — ACETAMINOPHEN 1000 MG: 500 TABLET ORAL at 07:44

## 2018-12-18 RX ADMIN — CELECOXIB 100 MG: 100 CAPSULE ORAL at 09:17

## 2018-12-18 RX ADMIN — ALVIMOPAN 12 MG: 12 CAPSULE ORAL at 09:17

## 2018-12-18 RX ADMIN — ENOXAPARIN SODIUM 40 MG: 40 INJECTION SUBCUTANEOUS at 09:17

## 2018-12-18 NOTE — WOUND CARE
CWOCN Ostomy Progress Note:     Third postoperative visit. Type of Ostomy: diverting loop ileostomy with LAR and j-pouch formation   Location: [facility]  Date of Surgery: 12/13/18      Surgeon: Dr. Kita Cyr    Findings:  Current appliance: Coloplast 1-piece flat with paste  Stoma size: ~1.25\"  Stoma color: pink & moist  Characteristics: budded with red rubber catheter to support  Peristomal skin: intact  Abdomen: soft  Output: liquid brown / positive flatus   Other: DIANNE removed by RN this morning    Teaching/Subjects covered today:    - Normal & abnormal stoma characteristics & changes over time  - Normal & abnormal peristomal characteristics   - When/how to clean stoma & peristomal skin  - Crusting technique  - When/how to change appliance  - Where/how to obtain supplies  - ERAS I&O recording and flowsheet - gave her measuring containers  - encouraged fluid intake to stay hydrated    Her  was present and both feel comfortable going home and managing ostomy. Recommendations:  1. Empty appliance when 1/3 full and PRN. Encourage patient/family to notify nurse and  assist w/ pouch emptying to promote self-care. Change appliance twice weekly and PRN for leaking ASAP. DO NOT REINFORCE LEAKS to avoid skin breakdown. Transition of Care:  Supplies given to patient with ordering information.     Kieran Fan RN, BSN, [facility]  Certified Wound, Ostomy, Continence Nurse  office: [phone]  pager [phone] or [phone]

## 2018-12-18 NOTE — DISCHARGE SUMMARY
Discharge Summary     Patient ID:    Milla Sharpe  [id number]  89 y.o.  1960    Admission Date: 12/13/2018    Discharge Date: 12/18/2018    Admission Diagnoses:  1. Rectal cancer  2. Obesity  3. Hyperthyroidism      Chronic Diagnoses:    Patient Active Problem List   Diagnosis Code    Mixed hyperlipidemia E78.2    Rectal cancer metastasized to intrapelvic lymph node (HCC) C20, C77.5    Class 1 obesity with serious comorbidity and body mass index (BMI) of 33.0 to 33.9 in adult E66.9, Z68.33    Hyperthyroidism E05.90    Obesity (BMI 30.0-34. 9) E66.9       Discharge Diagnoses:  1. Rectal cancer [pre-treatment [id number] (stage III); post-treatment [id number]] (resected)  2. Obesity  3. Hyperthyroidism  4. Hypophosphatemia (corrected)      Hospital Problems as of 12/18/2018 Date Reviewed: 12/13/2018          Codes Class Noted - Resolved POA    Hyperthyroidism (Chronic) ICD-10-CM: E05.90  ICD-9-CM: 242.90  12/13/2018 - Present Yes        Obesity (BMI 30.0-34.9) (Chronic) ICD-10-CM: E66.9  ICD-9-CM: 278.00  12/13/2018 - Present Yes        * (Principal) Rectal cancer metastasized to intrapelvic lymph node (HCC) (Chronic) ICD-10-CM: C20, C77.5  ICD-9-CM: 154.1, 196.6  7/17/2018 - Present Yes              Procedures Performed:  1. Cystoscopy and placement of bilateral temporary ureteral catheters was performed by Ines Davalos MD on 12/13/2018. 2. Hand-assisted laparoscopic low anterior resection, mobilization of the splenic flexure, creation of colonic J-pouch, coloanal anastomosis, and creation of loop ileostomy was performed by Dr. Dee Suazo on 12/13/2018. Discharge Medications:   Current Discharge Medication List      START taking these medications    Details   oxyCODONE IR (ROXICODONE) 5 mg immediate release tablet Take 1 Tab by mouth every four (4) hours as needed.  Max Daily Amount: 30 mg.  Qty: 20 Tab, Refills: 0    Associated Diagnoses: Acute post-operative pain         CONTINUE these medications which have NOT CHANGED    Details   benzonatate (TESSALON) 100 mg capsule Take 1 Cap by mouth three (3) times daily as needed for Cough. Qty: 30 Cap, Refills: 0    Associated Diagnoses: Cough      terconazole (TERAZOL 7) 0.4 % vaginal cream INSERT 1 APPLICATORFUL IN VAGINA EVERY EVENING FOR 7 DAYS IF NEEDED FOR YEAST INFECTION  Refills: 0      methIMAzole (TAPAZOLE) 5 mg tablet Take 5 mg by mouth nightly. loratadine (CLARITIN) 10 mg tablet Take 10 mg by mouth daily as needed. Diet:  Low fiber diet for two weeks. Activity:  No lifting more than 10 lb. or engaging in strenuous activity for 4 weeks after the surgery date. No driving for 2 weeks after the surgery date. Stair climbing and frequent ambulation are encouraged. Wound Care:  Dry dressing to the site at which the drain had been until dry and intact for at least 24 hours. Discharge Condition:  Improved compared to that upon admission. Disposition:  Home with home health for ostomy assistance and education. Follow-up Care:  1. Dr. Chel Mckinney on 12/21/2018. 2. Samuel Olivares MD in 1-2 weeks. 3.  Dr. Lissett Funes for Oncology follow-up. Significant Diagnostic Studies:   Recent Labs     12/18/18  0338 12/17/18  0323   WBC 6.0 5.7   HGB 10.2* 10.5*   HCT 32.9* 34.0*    178     Recent Labs     12/17/18  0323 12/16/18  0312    140   K 4.2 3.9   * 107   CO2 25 26   BUN 9 8   CREA 0.66 0.70   GLU 90 96   CA 8.2* 8.1*   MG 2.0  --    PHOS 3.6 2.8         HOSPITAL COURSE & TREATMENT RENDERED:     The patient is a 72-year-old female who underwent a colonoscopy on 06/28/2018 to evaluate rectal bleeding and a change in bowel pattern. The procedure was performed by Vera Rodriguez MD, and it revealed a distal rectal mass. The mass was biopsied, and the diagnosis of adenocarcinoma was confirmed.   Pretreatment staging included a CT scan of the chest, abdomen, and pelvis performed on 07/06/2018 and an endoscopic rectal ultrasound performed by Dr. Patricia Mari on 07/11/2018. Based on these studies, the disease was staged as [id number] (stage III). The patient then received neoadjuvant therapy with Xeloda and 5040 cGy of external beam radiation. She received her final radiation dose on 09/20/2018, and examination in the office on 11/05/2018 revealed that the tumor had responded to the treatment but that it was still visible and palpable with a distal margin located posteriorly at 4-5 cm from the anal verge. She has continued to move her bowels up to 3 times per day, and she denies currently having any bleeding or experiencing any symptoms of fecal incontinence. She appeared to have recovered sufficiently from the chemoradiation therapy, and resection of the residual disease was now indicated. Because of the low location within the rectum, it was explained that obtaining an adequate distal margin would most likely require an ultra-low anterior resection or an abdominoperineal resection. It was explained that an intraoperative decision would be made between these two procedures and that while the former would include the creation of a temporary loop ileostomy the latter would require a permanent colostomy. The risks of surgery were discussed and explained in detail, and the patient agreed to proceed. She was taken to the operating room on 12/13/2018, and there the above-listed procedures were performed without apparent complications. The final pathology report would ndicate that the patient had a complete pathologic response to the neoadjuvant chemoradiation. Post-operatively, the patient was transferred from the recovery room to the PSBU (stepdown unit) for close monitoring. She remained there until POD#2 when telemetry was discontinued and she was transferred to the floor. Her gastrointestinal function gradually returned, and her diet was advanced appropriately.   She received ostomy education and physical therapy. On 12/18/2018 (POD#5), she was hemodynamically stable, afebrile, tolerating a low fiber diet with a reasonable ileostomy output, and ambulating well. She demonstrated no sign of infection or other significant complication, and she was judged to be fit for discharge to home with home health. Her condition upon discharge was improved compared to that upon admission, and she appeared to have understood all discharge and follow-up instructions.       Signed:  Scarlett Graff MD

## 2018-12-18 NOTE — PROGRESS NOTES
Problem: Falls - Risk of  Goal: *Absence of Falls  Document Mitesh Fall Risk and appropriate interventions in the flowsheet.   Outcome: Progressing Towards Goal  Fall Risk Interventions:            Medication Interventions: Evaluate medications/consider consulting pharmacy, Patient to call before getting OOB, Teach patient to arise slowly         History of Falls Interventions: Door open when patient unattended, Evaluate medications/consider consulting pharmacy, Investigate reason for fall, Room close to nurse's station

## 2018-12-18 NOTE — PROGRESS NOTES
Patient discharged home with home health. Discharge instructions and prescriptions given and explained to pt. Pt verbalized understanding. Peripheral IV discontinued, tip intact, pt tolerated well. DIANNE drain also discontinued, tip intact, pt tolerated well. Pt off unit via wheelchair. Pt's  to transport pt home.

## 2018-12-18 NOTE — DISCHARGE INSTRUCTIONS
Discharge Instructions for [facility] Colorectal Surgery Patients       1. Do not lift any objects weighing more than 10 pounds for 4 weeks after the surgery date. 2. Do not do any housework including vacuuming, scrubbing or yardwork for 4 weeks. 3. Do not drive for two weeks after the surgery date or while taking sedating medications. 4. You may walk as desired and go up and down stairs as needed. 5. You may shower. Do not take tub baths, swim or use hot tubs for 4 weeks. 6. Leave steri-strips on incision. They will fall off on their own. You may have dermabond on your incisions which is surgical glue. This will dissolve over time. Do not scrub around incisions. Keep the drain site incision covered with a dry dressing, changing it daily and as needed until the wound appears to be closed and there has been no drainage for at least 24 hours. 7. Follow low-fiber diet for 2 more weeks. (See handbook for additional details). 8. Drink nutritional supplements 2 times per day until your diet and appetite are back to normal.     9. Multiple bowel movements are normal each day. Contact your surgeons office for any concerns. 10. Take Tylenol (up to 1000 mg every 6 hours) as your first choice for pain. If you need something in addition to the Tylenol, you may take oxycodone as prescribed. 11. Follow up with Dr. Gina Murillo at 10:30 AM on Friday 12/21/2018. You should also schedule follow-up with Dr. Kelvin Abdullahi. 12. Please bring your ostomy supplies to the first office visit with your surgeon. 13. If you experience fever (greater than 100.5), chills, vomiting, redness around a wound, or drainage from a wound other than the site at which the drain had been, please contact your surgeons office. 14. For questions regarding quality or quantity of ostomy output, refer to ERAS handbook or call surgeons office. Please see handbook for additional instructions.  If you have further questions, please call your surgeons office.

## 2018-12-18 NOTE — ROUTINE PROCESS
Bedside and Verbal shift change report given to [address] [place] [address] (oncoming nurse) by Dre Crocker RN (offgoing nurse). Report included the following information SBAR, Kardex, Procedure Summary, Intake/Output, MAR, Accordion and Recent Results.

## 2019-01-17 ENCOUNTER — OFFICE VISIT (OUTPATIENT)
Dept: ONCOLOGY | Age: 59
End: 2019-01-17

## 2019-01-17 ENCOUNTER — DOCUMENTATION ONLY (OUTPATIENT)
Dept: ONCOLOGY | Age: 59
End: 2019-01-17

## 2019-01-17 VITALS
OXYGEN SATURATION: 98 % | BODY MASS INDEX: 31.01 KG/M2 | WEIGHT: 197.6 LBS | DIASTOLIC BLOOD PRESSURE: 78 MMHG | RESPIRATION RATE: 20 BRPM | HEIGHT: 67 IN | HEART RATE: 82 BPM | SYSTOLIC BLOOD PRESSURE: 123 MMHG | TEMPERATURE: 97.5 F

## 2019-01-17 DIAGNOSIS — C20 RECTAL CANCER METASTASIZED TO INTRAPELVIC LYMPH NODE (HCC): Primary | Chronic | ICD-10-CM

## 2019-01-17 DIAGNOSIS — C77.5 RECTAL CANCER METASTASIZED TO INTRAPELVIC LYMPH NODE (HCC): Primary | Chronic | ICD-10-CM

## 2019-01-17 RX ORDER — DEXAMETHASONE 4 MG/1
8 TABLET ORAL SEE ADMIN INSTRUCTIONS
Qty: 24 TAB | Refills: 1 | Status: ON HOLD | OUTPATIENT
Start: 2019-01-17 | End: 2019-04-01

## 2019-01-17 RX ORDER — ONDANSETRON 8 MG/1
8 TABLET, ORALLY DISINTEGRATING ORAL
Qty: 30 TAB | Refills: 5 | Status: SHIPPED | OUTPATIENT
Start: 2019-01-17 | End: 2019-03-01 | Stop reason: CLARIF

## 2019-01-17 NOTE — PROGRESS NOTES
Ayesha Alston is a 63 y.o. female here today for follow up, rectal cancer. 1. Have you been to the ER, urgent care clinic since your last visit? Hospitalized since your last visit? [facility] 12/13-12/18    2. Have you seen or consulted any other health care providers outside of the [address] [place] [address] since your last visit? Include any pap smears or colon screening.   No

## 2019-01-17 NOTE — PROGRESS NOTES
Cancer [place] at [place] [address] [address], [address], [place]: [phone]  F: [phone]    Reason for Visit:   Agata Porter is a 63 y.o. female who is seen in follow up for Rectal cancer. Treatment History:   · 6/29/18-colonoscopy was notable for a bleeding mass of malignant appearance in the mid rectum and distal rectum at a distance between 4 cm to 11 cm from the anus. Caused partial obstruction. · 7/6/18-CT chest abdomen pelvis showed rectal thickening, some perirectal stranding and several subcentimeter perirectal lymph nodes  · 7/11/18: Rectal ultrasound showed a partially circumferential mass seen in the distal rectum extending through the muscularis propria, 2 small lymph nodes about 6 mm in size adjacent to the mass. · 8/7/18~ 9/20/18-  Xeloda with Radiation  · 12/31/18: Hand-assisted laparoscopic low anterior resection- 0/17 nodes, no residual cancer and negative margins    ·     History of Present Illness:   Patient is a 63 y.o. female who had not had a screening colonoscopy developed constipation and subsequently BRBPR x 5-6 months. She wondered if this was secondary to L thyroxine. However symptoms continued to progress and she was referred to GI for further evaluation. Further investigations as above. Completed neoadjuvant chemoradiation and comes after LAR on 12/3/1/18 by Dr. Luis James    She is feeling well today. Healing apart from irritation from stoma/ bag. Stools without blood. She has slowly recovering appetite. Has lost 20 lb. Not much pain. Denies mouth sores, fevers, or skin rashes. She has 1 adopted daughter and supportive    Quit smoking in 1992  Rare ETOH    No FH of cancer. Labs every 2 weeks    Past Medical History:   Diagnosis Date    Asthma     SEASONAL ALLERGY INDUCED    GERD (gastroesophageal reflux disease)     Rectal cancer ([facility] [address] 06/29/2018    Diagnosed via colonoscopy on 6/29/2018. Staged as [id number]. Treated with neoadjuvant chemoradiation using Xeloda and 5040 cGy radiation and with the final radiation dose administered on 2018.  Thyroid disease     hyperthyroidism      Past Surgical History:   Procedure Laterality Date    HX COLONOSCOPY  2018    Dr. Jhonny Sanchez.  HX GI  2005    Laparoscopic cholecystectomy.  HX HEENT      Sinus surgery.  HX ORTHOPAEDIC Right 1998    Right carpal tunnel release.  HX ORTHOPAEDIC  1982    Right ulnar nerve repositioning. Social History     Tobacco Use    Smoking status: Former Smoker     Packs/day: 0.50     Years: 10.00     Pack years: 5.00     Last attempt to quit:      Years since quittin.0    Smokeless tobacco: Never Used   Substance Use Topics    Alcohol use: No      Family History   Problem Relation Age of Onset    Heart Disease Mother     Cancer Mother         CERVICAL    Hypertension Mother     Heart Disease Father     Hypertension Father      Current Outpatient Medications   Medication Sig    methIMAzole (TAPAZOLE) 5 mg tablet Take 5 mg by mouth nightly.  oxyCODONE IR (ROXICODONE) 5 mg immediate release tablet Take 1 Tab by mouth every four (4) hours as needed. Max Daily Amount: 30 mg.    benzonatate (TESSALON) 100 mg capsule Take 1 Cap by mouth three (3) times daily as needed for Cough.  terconazole (TERAZOL 7) 0.4 % vaginal cream INSERT 1 APPLICATORFUL IN VAGINA EVERY EVENING FOR 7 DAYS IF NEEDED FOR YEAST INFECTION    loratadine (CLARITIN) 10 mg tablet Take 10 mg by mouth daily as needed. No current facility-administered medications for this visit. No Known Allergies     Review of Systems: A complete review of systems was obtained, negative except as described above.     Physical Exam:     Visit Vitals  /78 (BP 1 Location: Left arm, BP Patient Position: Sitting)   Pulse 82   Temp 97.5 °F (36.4 °C) (Oral)   Resp 20   Ht 5' 6.5\" (1.689 m)   Wt 197 lb 9.6 oz (89.6 kg)   SpO2 98%   BMI 31.42 kg/m² ECOG PS: 0  General: No distress  Eyes: PERRL, anicteric sclerae  HENT: Atraumatic, OP clear  Neck: Supple  Lymphatic: No cervical, supraclavicular adenopathy  Respiratory: CTAB, normal respiratory effort  CV: Normal rate, regular rhythm, no murmurs, no peripheral edema  GI: Soft, nontender, nondistended, no masses, no hepatomegaly, no splenomegaly, Stoma noted  MS: Normal gait and station. Digits without clubbing or cyanosis. Skin: No rashes, ecchymoses, or petechiae. Normal temperature, turgor, and texture. Psych: Alert, oriented, appropriate affect, normal judgment/insight    Results:     Lab Results   Component Value Date/Time    WBC 6.0 12/18/2018 03:38 AM    HGB 10.2 (L) 12/18/2018 03:38 AM    HCT 32.9 (L) 12/18/2018 03:38 AM    PLATELET 170 69/05/2710 03:38 AM    MCV 88.4 12/18/2018 03:38 AM    ABS. NEUTROPHILS 4.6 12/18/2018 03:38 AM     Lab Results   Component Value Date/Time    Sodium 140 12/17/2018 03:23 AM    Potassium 4.2 12/17/2018 03:23 AM    Chloride 109 (H) 12/17/2018 03:23 AM    CO2 25 12/17/2018 03:23 AM    Glucose 90 12/17/2018 03:23 AM    BUN 9 12/17/2018 03:23 AM    Creatinine 0.66 12/17/2018 03:23 AM    GFR est AA >60 12/17/2018 03:23 AM    GFR est non-AA >60 12/17/2018 03:23 AM    Calcium 8.2 (L) 12/17/2018 03:23 AM     Lab Results   Component Value Date/Time    Bilirubin, total 0.3 12/03/2018 03:37 PM    ALT (SGPT) 33 12/03/2018 03:37 PM    AST (SGOT) 20 12/03/2018 03:37 PM    Alk. phosphatase 96 12/03/2018 03:37 PM    Protein, total 6.9 12/03/2018 03:37 PM    Albumin 3.4 (L) 12/03/2018 03:37 PM    Globulin 3.5 12/03/2018 03:37 PM     Records reviewed and summarized above. Pathology report(s) reviewed above. Radiology report(s) reviewed above.     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 Potential side effects include, but are not limited to, nausea, vomiting, diarrhea, taste changes, myelosuppression, infection, fatigue, allergic reactions, rash, edema, neuropathy, and rarely, death. The patient asked several well thought out questions which I answered to the best of my ability and to their apparent satisfaction. The patient has given consent for chemotherapy. Plan to start by Feb 7     Port ordered    MSI ordered      2) Hypothyroidism  Controlled     3) Anemia  Post op. Will monitoe        Plan:     · Port placement  · Approval for 8 cycles of adjuvant FOLFOX   · Day 2 and 3 dexamethasone called in  · Zofran at home  · MSI  · Emend included in premeds    RTC 2/7/19 for cycle 1    > 50% of this 40 min face to face encounter was spent in counseling and care co ordination      I appreciate the opportunity to participate in Ms. Mona Galeano's care.     Seen in conjunction with Coco Veras NP    Signed By: Suman Mike MD

## 2019-01-17 NOTE — PROGRESS NOTES
Had the opportunity to meet with patient and  to discuss port placement, OPIC and chemotherapy expectations. (FOLFOX q2 x 3mo)  Reviewed and provided cancer institute educational material and chemo and you booklet. Reviewed management of possible side effects and when to seek medical attention. Home medications reviewed. All questions answered. Contact information provided. Encouraged to call with any additional questions/concerns. She expressed concerns about her ostomy. At covered site states it is raw red skin and oozes blood. Constant pain. Holding pressure at site during visit to help alleviate pain and movement of bag near site. Allergic to paste/material?   of assist to her when changing bag. She has been working with the inpatient ostomy nurse without success. She will have another discussion with her today. She will advise outcome. Plans to start chemo treatment in a week or two. Possible referral back to Dr Antonia Velasco or home health? .  They have had home health in the past without relief. [place] paperwork taken for completion. Chemo consent obtained.

## 2019-01-18 RX ORDER — ACETAMINOPHEN 325 MG/1
650 TABLET ORAL AS NEEDED
Status: CANCELLED
Start: 2019-02-06

## 2019-01-18 RX ORDER — ONDANSETRON 2 MG/ML
8 INJECTION INTRAMUSCULAR; INTRAVENOUS AS NEEDED
Status: CANCELLED | OUTPATIENT
Start: 2019-02-06

## 2019-01-18 RX ORDER — SODIUM CHLORIDE 0.9 % (FLUSH) 0.9 %
10 SYRINGE (ML) INJECTION AS NEEDED
Status: CANCELLED
Start: 2019-02-08

## 2019-01-18 RX ORDER — SODIUM CHLORIDE 9 MG/ML
10 INJECTION INTRAMUSCULAR; INTRAVENOUS; SUBCUTANEOUS AS NEEDED
Status: CANCELLED | OUTPATIENT
Start: 2019-02-08

## 2019-01-18 RX ORDER — DIPHENHYDRAMINE HYDROCHLORIDE 50 MG/ML
50 INJECTION, SOLUTION INTRAMUSCULAR; INTRAVENOUS AS NEEDED
Status: CANCELLED
Start: 2019-02-06

## 2019-01-18 RX ORDER — DEXTROSE MONOHYDRATE 50 MG/ML
25 INJECTION, SOLUTION INTRAVENOUS CONTINUOUS
Status: CANCELLED
Start: 2019-02-06

## 2019-01-18 RX ORDER — SODIUM CHLORIDE 9 MG/ML
10 INJECTION INTRAMUSCULAR; INTRAVENOUS; SUBCUTANEOUS AS NEEDED
Status: CANCELLED | OUTPATIENT
Start: 2019-02-06

## 2019-01-18 RX ORDER — HYDROCORTISONE SODIUM SUCCINATE 100 MG/2ML
100 INJECTION, POWDER, FOR SOLUTION INTRAMUSCULAR; INTRAVENOUS AS NEEDED
Status: CANCELLED | OUTPATIENT
Start: 2019-02-06

## 2019-01-18 RX ORDER — FLUOROURACIL 50 MG/ML
400 INJECTION, SOLUTION INTRAVENOUS ONCE
Status: CANCELLED
Start: 2019-02-06 | End: 2019-02-06

## 2019-01-18 RX ORDER — HEPARIN 100 UNIT/ML
300-500 SYRINGE INTRAVENOUS AS NEEDED
Status: CANCELLED
Start: 2019-02-06

## 2019-01-18 RX ORDER — HEPARIN 100 UNIT/ML
300-500 SYRINGE INTRAVENOUS AS NEEDED
Status: CANCELLED
Start: 2019-02-08

## 2019-01-18 RX ORDER — SODIUM CHLORIDE 0.9 % (FLUSH) 0.9 %
10 SYRINGE (ML) INJECTION AS NEEDED
Status: CANCELLED
Start: 2019-02-06

## 2019-01-18 RX ORDER — ONDANSETRON 2 MG/ML
8 INJECTION INTRAMUSCULAR; INTRAVENOUS ONCE
Status: CANCELLED | OUTPATIENT
Start: 2019-02-06 | End: 2019-02-07

## 2019-01-18 RX ORDER — EPINEPHRINE 1 MG/ML
0.3 INJECTION, SOLUTION, CONCENTRATE INTRAVENOUS AS NEEDED
Status: CANCELLED | OUTPATIENT
Start: 2019-02-06

## 2019-01-18 RX ORDER — ALBUTEROL SULFATE 0.83 MG/ML
2.5 SOLUTION RESPIRATORY (INHALATION) AS NEEDED
Status: CANCELLED
Start: 2019-02-06

## 2019-01-22 ENCOUNTER — DOCUMENTATION ONLY (OUTPATIENT)
Dept: ONCOLOGY | Age: 59
End: 2019-01-22

## 2019-01-22 ENCOUNTER — TELEPHONE (OUTPATIENT)
Dept: ONCOLOGY | Age: 59
End: 2019-01-22

## 2019-01-22 NOTE — TELEPHONE ENCOUNTER
Patient called and stated that she dropped off short term disability forms and would like to know if they were done, Spoke with Stacie Mccormick and she stated that she will call the patient when they are done. Patient stated that she will pick the forms up tomorrow.     # [phone]

## 2019-01-24 ENCOUNTER — HOSPITAL ENCOUNTER (OUTPATIENT)
Dept: INTERVENTIONAL RADIOLOGY/VASCULAR | Age: 59
Discharge: HOME | End: 2019-01-24
Attending: INTERNAL MEDICINE | Admitting: INTERNAL MEDICINE
Payer: COMMERCIAL

## 2019-01-24 VITALS
OXYGEN SATURATION: 99 % | RESPIRATION RATE: 18 BRPM | TEMPERATURE: 97.1 F | BODY MASS INDEX: 31.02 KG/M2 | HEIGHT: 66 IN | DIASTOLIC BLOOD PRESSURE: 78 MMHG | HEART RATE: 80 BPM | WEIGHT: 193 LBS | SYSTOLIC BLOOD PRESSURE: 132 MMHG

## 2019-01-24 DIAGNOSIS — C20 RECTAL CANCER METASTASIZED TO INTRAPELVIC LYMPH NODE (HCC): Chronic | ICD-10-CM

## 2019-01-24 DIAGNOSIS — C77.5 RECTAL CANCER METASTASIZED TO INTRAPELVIC LYMPH NODE (HCC): Chronic | ICD-10-CM

## 2019-01-24 PROCEDURE — 74011000250 HC RX REV CODE- 250: Performed by: RADIOLOGY

## 2019-01-24 PROCEDURE — 74011250636 HC RX REV CODE- 250/636: Performed by: RADIOLOGY

## 2019-01-24 PROCEDURE — C1894 INTRO/SHEATH, NON-LASER: HCPCS

## 2019-01-24 PROCEDURE — 77030039266 HC ADH SKN EXOFIN S2SG -A

## 2019-01-24 PROCEDURE — C1788 PORT, INDWELLING, IMP: HCPCS

## 2019-01-24 PROCEDURE — 99152 MOD SED SAME PHYS/QHP 5/>YRS: CPT

## 2019-01-24 PROCEDURE — 77030031139 HC SUT VCRL2 J&J -A

## 2019-01-24 PROCEDURE — 77030011893 HC TY CUT DN TRIS -B

## 2019-01-24 RX ORDER — FENTANYL CITRATE 50 UG/ML
200 INJECTION, SOLUTION INTRAMUSCULAR; INTRAVENOUS
Status: DISCONTINUED | OUTPATIENT
Start: 2019-01-24 | End: 2019-01-24 | Stop reason: HOSPADM

## 2019-01-24 RX ORDER — HEPARIN 100 UNIT/ML
500 SYRINGE INTRAVENOUS AS NEEDED
Status: DISCONTINUED | OUTPATIENT
Start: 2019-01-24 | End: 2019-01-24 | Stop reason: HOSPADM

## 2019-01-24 RX ORDER — SODIUM CHLORIDE 9 MG/ML
25 INJECTION, SOLUTION INTRAVENOUS CONTINUOUS
Status: DISCONTINUED | OUTPATIENT
Start: 2019-01-24 | End: 2019-01-24 | Stop reason: HOSPADM

## 2019-01-24 RX ORDER — LIDOCAINE HYDROCHLORIDE AND EPINEPHRINE 10; 10 MG/ML; UG/ML
50 INJECTION, SOLUTION INFILTRATION; PERINEURAL ONCE
Status: COMPLETED | OUTPATIENT
Start: 2019-01-24 | End: 2019-01-24

## 2019-01-24 RX ORDER — CEFAZOLIN SODIUM/WATER 2 G/20 ML
2 SYRINGE (ML) INTRAVENOUS ONCE
Status: COMPLETED | OUTPATIENT
Start: 2019-01-24 | End: 2019-01-24

## 2019-01-24 RX ORDER — ONDANSETRON 2 MG/ML
4 INJECTION INTRAMUSCULAR; INTRAVENOUS AS NEEDED
Status: DISCONTINUED | OUTPATIENT
Start: 2019-01-24 | End: 2019-01-24 | Stop reason: HOSPADM

## 2019-01-24 RX ORDER — LIDOCAINE HYDROCHLORIDE 20 MG/ML
20 INJECTION, SOLUTION INFILTRATION; PERINEURAL ONCE
Status: COMPLETED | OUTPATIENT
Start: 2019-01-24 | End: 2019-01-24

## 2019-01-24 RX ORDER — MIDAZOLAM HYDROCHLORIDE 1 MG/ML
5 INJECTION, SOLUTION INTRAMUSCULAR; INTRAVENOUS
Status: DISCONTINUED | OUTPATIENT
Start: 2019-01-24 | End: 2019-01-24 | Stop reason: HOSPADM

## 2019-01-24 RX ADMIN — LIDOCAINE HYDROCHLORIDE 400 MG: 20 INJECTION, SOLUTION INFILTRATION; PERINEURAL at 10:48

## 2019-01-24 RX ADMIN — SODIUM CHLORIDE 25 ML/HR: 900 INJECTION, SOLUTION INTRAVENOUS at 10:22

## 2019-01-24 RX ADMIN — MIDAZOLAM 0.5 MG: 1 INJECTION INTRAMUSCULAR; INTRAVENOUS at 10:57

## 2019-01-24 RX ADMIN — MIDAZOLAM 1 MG: 1 INJECTION INTRAMUSCULAR; INTRAVENOUS at 10:49

## 2019-01-24 RX ADMIN — MIDAZOLAM 1 MG: 1 INJECTION INTRAMUSCULAR; INTRAVENOUS at 10:42

## 2019-01-24 RX ADMIN — MIDAZOLAM 1 MG: 1 INJECTION INTRAMUSCULAR; INTRAVENOUS at 10:35

## 2019-01-24 RX ADMIN — FENTANYL CITRATE 50 MCG: 50 INJECTION INTRAMUSCULAR; INTRAVENOUS at 10:42

## 2019-01-24 RX ADMIN — Medication 500 UNITS: at 10:58

## 2019-01-24 RX ADMIN — Medication 2 G: at 11:00

## 2019-01-24 RX ADMIN — LIDOCAINE HYDROCHLORIDE AND EPINEPHRINE 500 MG: 10; 10 INJECTION, SOLUTION INFILTRATION; PERINEURAL at 10:48

## 2019-01-24 RX ADMIN — FENTANYL CITRATE 50 MCG: 50 INJECTION INTRAMUSCULAR; INTRAVENOUS at 10:52

## 2019-01-24 RX ADMIN — ONDANSETRON 4 MG: 2 INJECTION INTRAMUSCULAR; INTRAVENOUS at 10:35

## 2019-01-24 NOTE — H&P
Interventional and Vascular Radiology History and Physical    Patient: Cristina Sampson 63 y.o. female       Chief Complaint: No chief complaint on file. History of Present Illness: Chemotherapy     History:    Past Medical History:   Diagnosis Date    Asthma     SEASONAL ALLERGY INDUCED    GERD (gastroesophageal reflux disease)     Rectal cancer ([facility] [address] 2018    Diagnosed via colonoscopy on 2018. Staged as [id number]. Treated with neoadjuvant chemoradiation using Xeloda and 5040 cGy radiation and with the final radiation dose administered on 2018.     Thyroid disease     hyperthyroidism     Family History   Problem Relation Age of Onset    Heart Disease Mother     Cancer Mother         CERVICAL    Hypertension Mother     Heart Disease Father     Hypertension Father      Social History     Socioeconomic History    Marital status:      Spouse name: Not on file    Number of children: Not on file    Years of education: Not on file    Highest education level: Not on file   Social Needs    Financial resource strain: Not on file    Food insecurity - worry: Not on file    Food insecurity - inability: Not on file   [employer] needs - medical: Not on file   [employer] needs - non-medical: Not on file   Occupational History    Not on file   Tobacco Use    Smoking status: Former Smoker     Packs/day: 0.50     Years: 10.00     Pack years: 5.00     Last attempt to quit:      Years since quittin.0    Smokeless tobacco: Never Used   Substance and Sexual Activity    Alcohol use: No    Drug use: No    Sexual activity: Not on file   Other Topics Concern    Not on file   Social History Narrative    Not on file       Allergies: No Known Allergies    Current Medications:  Current Facility-Administered Medications   Medication Dose Route Frequency    heparin (porcine) pf 500 Units  500 Units InterCATHeter PRN    0.9% sodium chloride infusion  25 mL/hr IntraVENous CONTINUOUS    fentaNYL citrate (PF) injection 200 mcg  200 mcg IntraVENous RAD PRN    midazolam (VERSED) injection 5 mg  5 mg IntraVENous RAD PRN    ondansetron (ZOFRAN) injection 4 mg  4 mg IntraVENous PRN        Physical Exam:  Blood pressure 132/78, pulse 80, temperature 97.1 °F (36.2 °C), resp. rate 18, height 5' 6\" (1.676 m), weight 87.5 kg (193 lb), SpO2 99 %. LUNG: clear to auscultation bilaterally, HEART: regular rate and rhythm, S1, S2 normal, no murmur, click, rub or gallop      Alerts:    Hospital Problems  Date Reviewed: 1/17/2019    None          Laboratory:    No results for input(s): HGB, HCT, WBC, PLT, INR, BUN, CREA, K, CRCLT, HGBEXT, HCTEXT, PLTEXT in the last 72 hours. No lab exists for component: PTT, PT, INREXT      Plan of Care/Planned Procedure:  Risks, benefits, and alternatives reviewed with patient and she agrees to proceed with the procedure. Conscious sedation will be performed with IV fentanyl and versed.  Plan is for chest port placement       Ariela Groves MD

## 2019-01-24 NOTE — PROGRESS NOTES
Pt discharged via wheelchair escorted by .  Discharge instructions given and both verbalize understanding

## 2019-01-24 NOTE — DISCHARGE INSTRUCTIONS
Implanted Port Discharge Instructions      General Instructions:   A port is like an implanted IV. They are usually ordered for patients who will be getting chemotherapy, but can also be used as an IV for long term antibiotics, large amounts of fluids, and/or blood products. Your blood can be drawn from your port for labs also. Those patients who do not have good veins find the ports convenient as they can get the IV they need with one stick. The port can be used long term, and the care is easy. The device is under the skin, and once the skin heals, care is minimal. All that is required is the nurse who accesses the port will need to flush it with heparinized saline after each use. Ports are usually placed in the chest wall, usually on the right side. But they can be place in the arms and in the abdomen. Home Care Instructions:    Watch for signs of infection:    1. Redness,   2. Fever, chills,   3. Increased pain, and/or drainage from the site. If this occurs, call your physician at once. Keep your dressing clean and dry. Leave the dressing in place until seen here next week. The dressing may be changed in your physicians office. Continue your previous diet and follow the medication reconciliation list.    You may take Tylenol, as directed on the label, for pain. Avoid ibuprofen (Advil, Motrin) and aspirin as they may cause you to bleed. Because you received sedation, you are not to drive or sign any legal documents for the next 24 hours. Do not lift anything heavier than 5 pounds with the affected arm for the next week.     If you have any questions or concerns, please call [phone] between 0730 am and 10 pm.  After hours, [phone],ask the  to page the x-ray technologist and describe the problem to the technologist.

## 2019-02-05 ENCOUNTER — TELEPHONE (OUTPATIENT)
Dept: ONCOLOGY | Age: 59
End: 2019-02-05

## 2019-02-05 DIAGNOSIS — C20 RECTAL CANCER METASTASIZED TO INTRAPELVIC LYMPH NODE (HCC): Primary | Chronic | ICD-10-CM

## 2019-02-05 DIAGNOSIS — C77.5 RECTAL CANCER METASTASIZED TO INTRAPELVIC LYMPH NODE (HCC): Primary | Chronic | ICD-10-CM

## 2019-02-05 RX ORDER — LIDOCAINE AND PRILOCAINE 25; 25 MG/G; MG/G
CREAM TOPICAL AS NEEDED
Qty: 30 G | Refills: 0 | Status: ON HOLD | OUTPATIENT
Start: 2019-02-05 | End: 2019-04-01

## 2019-02-05 NOTE — TELEPHONE ENCOUNTER
Call returned to patient, HIPAA verified. Patient would like to have lidocaine/EMLA cream sent to pharmacy for treatment tomorrow. Advised would forward to provider. Also discussed dexamethasone dosing and clarified additional questions. Encouraged patient to contact office with any further needs. Thanked for return call and assistance.

## 2019-02-05 NOTE — TELEPHONE ENCOUNTER
Patient called and would like a callback from [facility] regarding her chemo that she starts tomorrow.     [phone]

## 2019-02-06 ENCOUNTER — OFFICE VISIT (OUTPATIENT)
Dept: ONCOLOGY | Age: 59
End: 2019-02-06

## 2019-02-06 ENCOUNTER — HOSPITAL ENCOUNTER (OUTPATIENT)
Dept: INFUSION THERAPY | Age: 59
Discharge: HOME | End: 2019-02-06
Payer: COMMERCIAL

## 2019-02-06 VITALS
WEIGHT: 194 LBS | OXYGEN SATURATION: 96 % | DIASTOLIC BLOOD PRESSURE: 79 MMHG | RESPIRATION RATE: 16 BRPM | TEMPERATURE: 98.1 F | SYSTOLIC BLOOD PRESSURE: 112 MMHG | BODY MASS INDEX: 31.31 KG/M2 | HEART RATE: 96 BPM

## 2019-02-06 VITALS
DIASTOLIC BLOOD PRESSURE: 69 MMHG | WEIGHT: 196 LBS | SYSTOLIC BLOOD PRESSURE: 104 MMHG | BODY MASS INDEX: 31.5 KG/M2 | RESPIRATION RATE: 16 BRPM | HEART RATE: 83 BPM | HEIGHT: 66 IN

## 2019-02-06 DIAGNOSIS — Z51.11 CHEMOTHERAPY MANAGEMENT, ENCOUNTER FOR: ICD-10-CM

## 2019-02-06 DIAGNOSIS — C20 RECTAL CANCER METASTASIZED TO INTRAPELVIC LYMPH NODE (HCC): Primary | ICD-10-CM

## 2019-02-06 DIAGNOSIS — C77.5 RECTAL CANCER METASTASIZED TO INTRAPELVIC LYMPH NODE (HCC): Primary | ICD-10-CM

## 2019-02-06 LAB
ALBUMIN SERPL-MCNC: 3.3 G/DL (ref 3.5–5)
ALBUMIN/GLOB SERPL: 0.8 {RATIO} (ref 1.1–2.2)
ALP SERPL-CCNC: 122 U/L (ref 45–117)
ALT SERPL-CCNC: 37 U/L (ref 12–78)
ANION GAP SERPL CALC-SCNC: 10 MMOL/L (ref 5–15)
AST SERPL-CCNC: 25 U/L (ref 15–37)
BASOPHILS # BLD: 0 K/UL (ref 0–0.1)
BASOPHILS NFR BLD: 0 % (ref 0–1)
BILIRUB SERPL-MCNC: 0.4 MG/DL (ref 0.2–1)
BUN SERPL-MCNC: 13 MG/DL (ref 6–20)
BUN/CREAT SERPL: 13 (ref 12–20)
CALCIUM SERPL-MCNC: 9.1 MG/DL (ref 8.5–10.1)
CEA SERPL-MCNC: 0.7 NG/ML
CHLORIDE SERPL-SCNC: 106 MMOL/L (ref 97–108)
CO2 SERPL-SCNC: 22 MMOL/L (ref 21–32)
CREAT SERPL-MCNC: 1.01 MG/DL (ref 0.55–1.02)
DIFFERENTIAL METHOD BLD: ABNORMAL
EOSINOPHIL # BLD: 0.2 K/UL (ref 0–0.4)
EOSINOPHIL NFR BLD: 3 % (ref 0–7)
ERYTHROCYTE [DISTWIDTH] IN BLOOD BY AUTOMATED COUNT: 14.7 % (ref 11.5–14.5)
GLOBULIN SER CALC-MCNC: 4.3 G/DL (ref 2–4)
GLUCOSE SERPL-MCNC: 136 MG/DL (ref 65–100)
HCT VFR BLD AUTO: 40.9 % (ref 35–47)
HGB BLD-MCNC: 12.6 G/DL (ref 11.5–16)
IMM GRANULOCYTES # BLD AUTO: 0 K/UL (ref 0–0.04)
IMM GRANULOCYTES NFR BLD AUTO: 0 % (ref 0–0.5)
LYMPHOCYTES # BLD: 0.8 K/UL (ref 0.8–3.5)
LYMPHOCYTES NFR BLD: 14 % (ref 12–49)
MCH RBC QN AUTO: 26.2 PG (ref 26–34)
MCHC RBC AUTO-ENTMCNC: 30.8 G/DL (ref 30–36.5)
MCV RBC AUTO: 85 FL (ref 80–99)
MONOCYTES # BLD: 0.3 K/UL (ref 0–1)
MONOCYTES NFR BLD: 5 % (ref 5–13)
NEUTS SEG # BLD: 4.2 K/UL (ref 1.8–8)
NEUTS SEG NFR BLD: 78 % (ref 32–75)
NRBC # BLD: 0 K/UL (ref 0–0.01)
NRBC BLD-RTO: 0 PER 100 WBC
PLATELET # BLD AUTO: 218 K/UL (ref 150–400)
PLATELET COMMENTS,PCOM: ABNORMAL
PMV BLD AUTO: 11.2 FL (ref 8.9–12.9)
POTASSIUM SERPL-SCNC: 3.9 MMOL/L (ref 3.5–5.1)
PROT SERPL-MCNC: 7.6 G/DL (ref 6.4–8.2)
RBC # BLD AUTO: 4.81 M/UL (ref 3.8–5.2)
RBC MORPH BLD: ABNORMAL
RBC MORPH BLD: ABNORMAL
SODIUM SERPL-SCNC: 138 MMOL/L (ref 136–145)
WBC # BLD AUTO: 5.5 K/UL (ref 3.6–11)

## 2019-02-06 PROCEDURE — 74011000258 HC RX REV CODE- 258: Performed by: INTERNAL MEDICINE

## 2019-02-06 PROCEDURE — 74011000258 HC RX REV CODE- 258: Performed by: REGISTERED NURSE

## 2019-02-06 PROCEDURE — 74011250636 HC RX REV CODE- 250/636: Performed by: INTERNAL MEDICINE

## 2019-02-06 PROCEDURE — 96415 CHEMO IV INFUSION ADDL HR: CPT

## 2019-02-06 PROCEDURE — 96411 CHEMO IV PUSH ADDL DRUG: CPT

## 2019-02-06 PROCEDURE — 36415 COLL VENOUS BLD VENIPUNCTURE: CPT

## 2019-02-06 PROCEDURE — 77030012965 HC NDL HUBR BBMI -A

## 2019-02-06 PROCEDURE — 80053 COMPREHEN METABOLIC PANEL: CPT

## 2019-02-06 PROCEDURE — 96368 THER/DIAG CONCURRENT INF: CPT

## 2019-02-06 PROCEDURE — 74011250636 HC RX REV CODE- 250/636: Performed by: REGISTERED NURSE

## 2019-02-06 PROCEDURE — 82378 CARCINOEMBRYONIC ANTIGEN: CPT

## 2019-02-06 PROCEDURE — 96413 CHEMO IV INFUSION 1 HR: CPT

## 2019-02-06 PROCEDURE — 96375 TX/PRO/DX INJ NEW DRUG ADDON: CPT

## 2019-02-06 PROCEDURE — 96416 CHEMO PROLONG INFUSE W/PUMP: CPT

## 2019-02-06 PROCEDURE — 96367 TX/PROPH/DG ADDL SEQ IV INF: CPT

## 2019-02-06 PROCEDURE — 74011250636 HC RX REV CODE- 250/636

## 2019-02-06 PROCEDURE — 85025 COMPLETE CBC W/AUTO DIFF WBC: CPT

## 2019-02-06 RX ORDER — ONDANSETRON 2 MG/ML
8 INJECTION INTRAMUSCULAR; INTRAVENOUS ONCE
Status: COMPLETED | OUTPATIENT
Start: 2019-02-06 | End: 2019-02-06

## 2019-02-06 RX ORDER — DEXTROSE MONOHYDRATE 50 MG/ML
25 INJECTION, SOLUTION INTRAVENOUS CONTINUOUS
Status: DISPENSED | OUTPATIENT
Start: 2019-02-06 | End: 2019-02-06

## 2019-02-06 RX ORDER — FLUOROURACIL 50 MG/ML
400 INJECTION, SOLUTION INTRAVENOUS ONCE
Status: COMPLETED | OUTPATIENT
Start: 2019-02-06 | End: 2019-02-06

## 2019-02-06 RX ADMIN — SODIUM CHLORIDE 150 MG: 900 INJECTION, SOLUTION INTRAVENOUS at 13:16

## 2019-02-06 RX ADMIN — DEXAMETHASONE SODIUM PHOSPHATE 12 MG: 4 INJECTION, SOLUTION INTRA-ARTICULAR; INTRALESIONAL; INTRAMUSCULAR; INTRAVENOUS; SOFT TISSUE at 12:43

## 2019-02-06 RX ADMIN — DEXTROSE MONOHYDRATE 25 ML/HR: 5 INJECTION, SOLUTION INTRAVENOUS at 13:40

## 2019-02-06 RX ADMIN — FLUOROURACIL 820 MG: 50 INJECTION, SOLUTION INTRAVENOUS at 16:01

## 2019-02-06 RX ADMIN — FLUOROURACIL 4920 MG: 50 INJECTION, SOLUTION INTRAVENOUS at 16:01

## 2019-02-06 RX ADMIN — LEUCOVORIN CALCIUM 820 MG: 350 INJECTION, POWDER, LYOPHILIZED, FOR SOLUTION INTRAMUSCULAR; INTRAVENOUS at 13:44

## 2019-02-06 RX ADMIN — OXALIPLATIN 174.5 MG: 5 INJECTION, SOLUTION INTRAVENOUS at 13:48

## 2019-02-06 RX ADMIN — ONDANSETRON 8 MG: 2 INJECTION INTRAMUSCULAR; INTRAVENOUS at 12:41

## 2019-02-06 NOTE — PROGRESS NOTES
Cancer [place] at [facility] AT [place] [address] [address], [address], [place]: [phone]  F: [phone]    Reason for Visit:   Maddy Clark is a 63 y.o. female who is seen in follow up for Rectal cancer. Treatment History:   · 6/29/18-colonoscopy was notable for a bleeding mass of malignant appearance in the mid rectum and distal rectum at a distance between 4 cm to 11 cm from the anus. Caused partial obstruction. · 7/6/18-CT chest abdomen pelvis showed rectal thickening, some perirectal stranding and several subcentimeter perirectal lymph nodes  · 7/11/18: Rectal ultrasound showed a partially circumferential mass seen in the distal rectum extending through the muscularis propria, 2 small lymph nodes about 6 mm in size adjacent to the mass. · 8/7/18~ 9/20/18-  Xeloda with Radiation  · 12/31/18: Hand-assisted laparoscopic low anterior resection- 0/17 nodes, no residual cancer and negative margins  · 2/6/19: adjuvant FOLFOX    History of Present Illness:   Patient is a 63 y.o. female who had not had a screening colonoscopy developed constipation and subsequently BRBPR x 5-6 months. She wondered if this was secondary to L thyroxine. However symptoms continued to progress and she was referred to GI for further evaluation. Further investigations as above. Completed neoadjuvant chemoradiation and comes after LAR on 12/3/1/18 by Dr. Anamika Taveras. Presents today for cycle 1 of FOLFOX. She is feeling well. Working with an ostomy nurse and stoma site significantly improved. Denies blood in stools. She has slowly recovering appetite. Has some irritation around stoma site but no other pain. Denies fevers/chills. She has 1 adopted daughter and supportive    Quit smoking in 1992  Rare ETOH    No FH of cancer.      Past Medical History:   Diagnosis Date    Asthma     SEASONAL ALLERGY INDUCED    GERD (gastroesophageal reflux disease)     Rectal cancer ([facility] [address] 06/29/2018 Diagnosed via colonoscopy on 2018. Staged as [id number]. Treated with neoadjuvant chemoradiation using Xeloda and 5040 cGy radiation and with the final radiation dose administered on 2018.  Thyroid disease     hyperthyroidism      Past Surgical History:   Procedure Laterality Date    HX COLONOSCOPY  2018    Dr. Robbin Fierro.  HX GI  2005    Laparoscopic cholecystectomy.  HX HEENT      Sinus surgery.  HX ORTHOPAEDIC Right 1998    Right carpal tunnel release.  HX ORTHOPAEDIC  1982    Right ulnar nerve repositioning.  IR INSERT TUNL CVC W PORT OVER 5 YEARS  2019      Social History     Tobacco Use    Smoking status: Former Smoker     Packs/day: 0.50     Years: 10.00     Pack years: 5.00     Last attempt to quit:      Years since quittin.1    Smokeless tobacco: Never Used   Substance Use Topics    Alcohol use: No      Family History   Problem Relation Age of Onset    Heart Disease Mother     Cancer Mother         CERVICAL    Hypertension Mother     Heart Disease Father     Hypertension Father      Current Outpatient Medications   Medication Sig    lidocaine-prilocaine (EMLA) topical cream Apply  to affected area as needed for Pain.  dexamethasone (DECADRON) 4 mg tablet Take 8 mg by mouth See Admin Instructions. Take 2 tabs daily On day 2,3  after chemotherapy.  ondansetron (ZOFRAN ODT) 8 mg disintegrating tablet Take 1 Tab by mouth every eight (8) hours as needed for Nausea.  methIMAzole (TAPAZOLE) 5 mg tablet Take 5 mg by mouth nightly.  loratadine (CLARITIN) 10 mg tablet Take 10 mg by mouth daily as needed.  oxyCODONE IR (ROXICODONE) 5 mg immediate release tablet Take 1 Tab by mouth every four (4) hours as needed. Max Daily Amount: 30 mg.    benzonatate (TESSALON) 100 mg capsule Take 1 Cap by mouth three (3) times daily as needed for Cough.     terconazole (TERAZOL 7) 0.4 % vaginal cream INSERT 1 APPLICATORFUL IN VAGINA EVERY EVENING FOR 7 DAYS IF NEEDED FOR YEAST INFECTION     No current facility-administered medications for this visit. No Known Allergies     Review of Systems: A complete review of systems was obtained, negative except as described above. Physical Exam:     Visit Vitals  /79 (BP 1 Location: Left arm, BP Patient Position: Sitting)   Pulse 96   Temp 98.1 °F (36.7 °C) (Oral)   Resp 16   Ht (P) 5' 6\" (1.676 m)   Wt 194 lb (88 kg)   SpO2 96%   BMI (P) 31.31 kg/m²     ECOG PS: 0  General: No distress  Eyes: PERRL, anicteric sclerae  HENT: Atraumatic, OP clear  Neck: Supple  Lymphatic: No cervical, supraclavicular adenopathy  Respiratory: CTAB, normal respiratory effort  CV: Normal rate, regular rhythm, no murmurs, no peripheral edema  GI: Soft, nontender, nondistended, no masses, no hepatomegaly, no splenomegaly, Stoma noted  MS: Normal gait and station. Digits without clubbing or cyanosis. Skin: No rashes, ecchymoses, or petechiae. Normal temperature, turgor, and texture; PAC site looks good  Psych: Alert, oriented, appropriate affect, normal judgment/insight    Results:     Lab Results   Component Value Date/Time    WBC 5.5 02/06/2019 10:15 AM    HGB 12.6 02/06/2019 10:15 AM    HCT 40.9 02/06/2019 10:15 AM    PLATELET 137 39/60/4604 10:15 AM    MCV 85.0 02/06/2019 10:15 AM    ABS. NEUTROPHILS PENDING 02/06/2019 10:15 AM     Lab Results   Component Value Date/Time    Sodium 140 12/17/2018 03:23 AM    Potassium 4.2 12/17/2018 03:23 AM    Chloride 109 (H) 12/17/2018 03:23 AM    CO2 25 12/17/2018 03:23 AM    Glucose 90 12/17/2018 03:23 AM    BUN 9 12/17/2018 03:23 AM    Creatinine 0.66 12/17/2018 03:23 AM    GFR est AA >60 12/17/2018 03:23 AM    GFR est non-AA >60 12/17/2018 03:23 AM    Calcium 8.2 (L) 12/17/2018 03:23 AM     Lab Results   Component Value Date/Time    Bilirubin, total 0.3 12/03/2018 03:37 PM    ALT (SGPT) 33 12/03/2018 03:37 PM    AST (SGOT) 20 12/03/2018 03:37 PM    Alk.  phosphatase 96 12/03/2018 03:37 PM Protein, total 6.9 12/03/2018 03:37 PM    Albumin 3.4 (L) 12/03/2018 03:37 PM    Globulin 3.5 12/03/2018 03:37 PM     Records reviewed and summarized above. Pathology report(s) reviewed above. Radiology report(s) reviewed above. 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 Will monitor    Plan:     · Today is cycle 1 of 8 planned cycles of adjuvant FOLFOX (leucovirin 400mg/m2, oxaliplatin 85mg/m2, fluorouracil 400mg/m2, fluorouracil 2,400mg/m2)   · Premeds to include Zofran 8mg, Dexamethasone 12mg, and Emend 150mg  · Labs to include CBC with diff and CMP prior to each infusion. CEA monthly. · Dexamethasone 8mg daily on days 2 and 3 of chemotherapy  · Zofran PRN    RTC 2/20/19 for cycle 2    I appreciate the opportunity to participate in Ms. Nithya Galeano's care.     Signed By: Deepak Alcazar MD

## 2019-02-06 NOTE — PROGRESS NOTES
1000 Pt admit to [facility] for C1 Folfox ambulatory in stable condition. Assessment completed. No new concerns voiced. Port accessed and flushed  with positive blood return. Labs drawn per order and sent for processing. Patient to MD office. Patient returned to [facility], Normal Saline started at [facility]. Visit Vitals  Ht 5' 6\" (1.676 m)   Wt 88.9 kg (196 lb)   Breastfeeding? No   BMI 31.64 kg/m²       Medications:  Normal Saline KVO  Dextrose 5% KVO  Emend IVPB  Decadron IVPB  Zofran IV Push  Oxaliplatin  Leucovorin  5FU IV Push  5FU CADD    1615 Pt tolerated treatment well. Port maintained positive blood return throughout treatment, flushed with positive blood return at conclusion and 5FU CADD pump infusing at discharge. D/c home ambulatory in no distress. Pt aware of next [facility] appointment scheduled for 2/8/19. Recent Results (from the past 12 hour(s))   CBC WITH AUTOMATED DIFF    Collection Time: 02/06/19 10:15 AM   Result Value Ref Range    WBC 5.5 3.6 - 11.0 K/uL    RBC 4.81 3.80 - 5.20 M/uL    HGB 12.6 11.5 - 16.0 g/dL    HCT 40.9 35.0 - 47.0 %    MCV 85.0 80.0 - 99.0 FL    MCH 26.2 26.0 - 34.0 PG    MCHC 30.8 30.0 - 36.5 g/dL    RDW 14.7 (H) 11.5 - 14.5 %    PLATELET 721 560 - 456 K/uL    MPV 11.2 8.9 - 12.9 FL    NRBC 0.0 0  WBC    ABSOLUTE NRBC 0.00 0.00 - 0.01 K/uL    NEUTROPHILS 78 (H) 32 - 75 %    LYMPHOCYTES 14 12 - 49 %    MONOCYTES 5 5 - 13 %    EOSINOPHILS 3 0 - 7 %    BASOPHILS 0 0 - 1 %    IMMATURE GRANULOCYTES 0 0.0 - 0.5 %    ABS. NEUTROPHILS 4.2 1.8 - 8.0 K/UL    ABS. LYMPHOCYTES 0.8 0.8 - 3.5 K/UL    ABS. MONOCYTES 0.3 0.0 - 1.0 K/UL    ABS. EOSINOPHILS 0.2 0.0 - 0.4 K/UL    ABS. BASOPHILS 0.0 0.0 - 0.1 K/UL    ABS. IMM.  GRANS. 0.0 0.00 - 0.04 K/UL    DF SMEAR SCANNED      PLATELET COMMENTS Large Platelets      RBC COMMENTS ANISOCYTOSIS  1+        RBC COMMENTS OVALOCYTES  PRESENT       METABOLIC PANEL, COMPREHENSIVE    Collection Time: 02/06/19 10:15 AM   Result Value Ref Range    Sodium 138 136 - 145 mmol/L    Potassium 3.9 3.5 - 5.1 mmol/L    Chloride 106 97 - 108 mmol/L    CO2 22 21 - 32 mmol/L    Anion gap 10 5 - 15 mmol/L    Glucose 136 (H) 65 - 100 mg/dL    BUN 13 6 - 20 MG/DL    Creatinine 1.01 0.55 - 1.02 MG/DL    BUN/Creatinine ratio 13 12 - 20      GFR est AA >60 >60 ml/min/1.73m2    GFR est non-AA 56 (L) >60 ml/min/1.73m2    Calcium 9.1 8.5 - 10.1 MG/DL    Bilirubin, total 0.4 0.2 - 1.0 MG/DL    ALT (SGPT) 37 12 - 78 U/L    AST (SGOT) 25 15 - 37 U/L    Alk.  phosphatase 122 (H) 45 - 117 U/L    Protein, total 7.6 6.4 - 8.2 g/dL    Albumin 3.3 (L) 3.5 - 5.0 g/dL    Globulin 4.3 (H) 2.0 - 4.0 g/dL    A-G Ratio 0.8 (L) 1.1 - 2.2     CEA    Collection Time: 02/06/19 10:15 AM   Result Value Ref Range    CEA 0.7 ng/mL

## 2019-02-06 NOTE — PROGRESS NOTES
Juliet Mckeon is a 63 y.o. female here today for follow up, rectal cancer. 1. Have you been to the ER, urgent care clinic since your last visit? Hospitalized since your last visit? No     2. Have you seen or consulted any other health care providers outside of the [address] [place] [address] since your last visit? Include any pap smears or colon screening.  No

## 2019-02-08 ENCOUNTER — HOSPITAL ENCOUNTER (OUTPATIENT)
Dept: INFUSION THERAPY | Age: 59
Discharge: HOME | End: 2019-02-08
Payer: COMMERCIAL

## 2019-02-08 VITALS
TEMPERATURE: 97 F | SYSTOLIC BLOOD PRESSURE: 120 MMHG | HEART RATE: 83 BPM | DIASTOLIC BLOOD PRESSURE: 76 MMHG | RESPIRATION RATE: 18 BRPM

## 2019-02-08 DIAGNOSIS — C20 RECTAL CANCER METASTASIZED TO INTRAPELVIC LYMPH NODE (HCC): Primary | ICD-10-CM

## 2019-02-08 DIAGNOSIS — C77.5 RECTAL CANCER METASTASIZED TO INTRAPELVIC LYMPH NODE (HCC): Primary | ICD-10-CM

## 2019-02-08 PROCEDURE — 96523 IRRIG DRUG DELIVERY DEVICE: CPT

## 2019-02-08 PROCEDURE — 74011250636 HC RX REV CODE- 250/636: Performed by: INTERNAL MEDICINE

## 2019-02-08 RX ORDER — SODIUM CHLORIDE 9 MG/ML
10 INJECTION INTRAMUSCULAR; INTRAVENOUS; SUBCUTANEOUS AS NEEDED
Status: ACTIVE | OUTPATIENT
Start: 2019-02-08 | End: 2019-02-09

## 2019-02-08 RX ORDER — SODIUM CHLORIDE 0.9 % (FLUSH) 0.9 %
10 SYRINGE (ML) INJECTION AS NEEDED
Status: ACTIVE | OUTPATIENT
Start: 2019-02-08 | End: 2019-02-09

## 2019-02-08 RX ORDER — HEPARIN 100 UNIT/ML
300-500 SYRINGE INTRAVENOUS AS NEEDED
Status: ACTIVE | OUTPATIENT
Start: 2019-02-08 | End: 2019-02-09

## 2019-02-08 RX ADMIN — Medication 500 UNITS: at 14:43

## 2019-02-08 RX ADMIN — Medication 10 ML: at 14:43

## 2019-02-08 NOTE — PROGRESS NOTES
[facility] - Chemotherapy Progress Note    9962 Pt admit to [facility] for pump removal ambulatory in stable condition. Assessment completed. Pt reports nausea. No new concerns voiced. 5FU CADD pump completed and removed; port flushed with positive blood return, heparinized and de-accessed. Visit Vitals  /76   Pulse 83   Temp 97 °F (36.1 °C)   Resp 18       Medications:  NS flush  Heparin flush    1445 Pt tolerated treatment well. D/c home ambulatory in no distress. Pt aware of next [facility] appointment scheduled for 02/20/2019.

## 2019-02-11 ENCOUNTER — TELEPHONE (OUTPATIENT)
Dept: ONCOLOGY | Age: 59
End: 2019-02-11

## 2019-02-11 DIAGNOSIS — C20 RECTAL CANCER METASTASIZED TO INTRAPELVIC LYMPH NODE (HCC): Primary | Chronic | ICD-10-CM

## 2019-02-11 DIAGNOSIS — C77.5 RECTAL CANCER METASTASIZED TO INTRAPELVIC LYMPH NODE (HCC): Primary | Chronic | ICD-10-CM

## 2019-02-11 RX ORDER — PROCHLORPERAZINE MALEATE 5 MG
5 TABLET ORAL
Qty: 30 TAB | Refills: 2 | Status: ON HOLD | OUTPATIENT
Start: 2019-02-11 | End: 2019-04-01

## 2019-02-11 NOTE — TELEPHONE ENCOUNTER
Call to patient. HIPAA verified. C1 modified FOLFOX administered 2/6. Patient has taken dexamethasone as directed. Zofran 1 maybe two times a day. Intermittent tingling in her feet has not gone away. Tingling in fingers have subsided a little. Cold sensitivity. Room temperature liquids bother her throat. It is not pain but she can \"feel it\". She warms fluid in her mouth prior to swallowing. Not eating well. Trying to hydrate. She doesn't feel like she needs to come in for hydration at this point but will monitor and advise. Nausea started yesterday. Vomit x1 last night and once this morning. \"I don't think I can do this if this is the way it is\". Advised to take zofran q8 hours. Advised we would call something else in to her pharmacy for nausea. Encouraged her to try and eat mildly BRAT diet, mashed potatoes, pasta. Encouraged to slightly warm fluids to alleviate sensation in throat. Encouraged to call if any additional questions/concerns and to keep us posted of progress.

## 2019-02-11 NOTE — TELEPHONE ENCOUNTER
Patient called and stated she is having symptoms nausea and she would like a callback to see if there is anything she can take.     [phone]

## 2019-02-11 NOTE — TELEPHONE ENCOUNTER
Compazine q6 hours sent to pharmacy on file. She can alterate this with Zofran. Can also come in for IVF and IV anti-emetics if needed. If this does not alleviate nausea have her call back for further direction.

## 2019-02-11 NOTE — TELEPHONE ENCOUNTER
Call to patient, HIPAA verified. Advised of compazine prescription sent in to pharmacy on file. Advised to alternate with Zofran to help with relief of nausea. Encouraged to contact office if this is not effective or if unrelieved symptoms progress. Patient does not believe she is in need of IVF or antiemetics at this time. Advised to contact office is she feels this would be of benefit and she is unable to hydrate at home. Patient verbalized understanding and thanked for call.

## 2019-02-13 RX ORDER — EPINEPHRINE 1 MG/ML
0.3 INJECTION, SOLUTION, CONCENTRATE INTRAVENOUS AS NEEDED
Status: CANCELLED | OUTPATIENT
Start: 2019-03-06

## 2019-02-13 RX ORDER — DIPHENHYDRAMINE HYDROCHLORIDE 50 MG/ML
50 INJECTION, SOLUTION INTRAMUSCULAR; INTRAVENOUS AS NEEDED
Status: CANCELLED
Start: 2019-03-06

## 2019-02-13 RX ORDER — DIPHENHYDRAMINE HYDROCHLORIDE 50 MG/ML
50 INJECTION, SOLUTION INTRAMUSCULAR; INTRAVENOUS AS NEEDED
Status: CANCELLED
Start: 2019-02-20

## 2019-02-13 RX ORDER — HYDROCORTISONE SODIUM SUCCINATE 100 MG/2ML
100 INJECTION, POWDER, FOR SOLUTION INTRAMUSCULAR; INTRAVENOUS AS NEEDED
Status: CANCELLED | OUTPATIENT
Start: 2019-03-06

## 2019-02-13 RX ORDER — HEPARIN 100 UNIT/ML
300-500 SYRINGE INTRAVENOUS AS NEEDED
Status: CANCELLED
Start: 2019-02-20

## 2019-02-13 RX ORDER — SODIUM CHLORIDE 9 MG/ML
10 INJECTION INTRAMUSCULAR; INTRAVENOUS; SUBCUTANEOUS AS NEEDED
Status: CANCELLED | OUTPATIENT
Start: 2019-03-06

## 2019-02-13 RX ORDER — ACETAMINOPHEN 325 MG/1
650 TABLET ORAL AS NEEDED
Status: CANCELLED
Start: 2019-02-20

## 2019-02-13 RX ORDER — EPINEPHRINE 1 MG/ML
0.3 INJECTION, SOLUTION, CONCENTRATE INTRAVENOUS AS NEEDED
Status: CANCELLED | OUTPATIENT
Start: 2019-02-20

## 2019-02-13 RX ORDER — SODIUM CHLORIDE 0.9 % (FLUSH) 0.9 %
10 SYRINGE (ML) INJECTION AS NEEDED
Status: CANCELLED
Start: 2019-03-08

## 2019-02-13 RX ORDER — HEPARIN 100 UNIT/ML
300-500 SYRINGE INTRAVENOUS AS NEEDED
Status: CANCELLED
Start: 2019-03-08

## 2019-02-13 RX ORDER — ONDANSETRON 2 MG/ML
8 INJECTION INTRAMUSCULAR; INTRAVENOUS ONCE
Status: CANCELLED | OUTPATIENT
Start: 2019-03-06 | End: 2019-03-06

## 2019-02-13 RX ORDER — HEPARIN 100 UNIT/ML
300-500 SYRINGE INTRAVENOUS AS NEEDED
Status: CANCELLED
Start: 2019-03-06

## 2019-02-13 RX ORDER — SODIUM CHLORIDE 9 MG/ML
10 INJECTION INTRAMUSCULAR; INTRAVENOUS; SUBCUTANEOUS AS NEEDED
Status: CANCELLED | OUTPATIENT
Start: 2019-02-22

## 2019-02-13 RX ORDER — FLUOROURACIL 50 MG/ML
400 INJECTION, SOLUTION INTRAVENOUS ONCE
Status: CANCELLED
Start: 2019-02-20 | End: 2019-02-20

## 2019-02-13 RX ORDER — SODIUM CHLORIDE 0.9 % (FLUSH) 0.9 %
10 SYRINGE (ML) INJECTION AS NEEDED
Status: CANCELLED
Start: 2019-02-22

## 2019-02-13 RX ORDER — ONDANSETRON 2 MG/ML
8 INJECTION INTRAMUSCULAR; INTRAVENOUS ONCE
Status: CANCELLED | OUTPATIENT
Start: 2019-02-20 | End: 2019-02-20

## 2019-02-13 RX ORDER — ONDANSETRON 2 MG/ML
8 INJECTION INTRAMUSCULAR; INTRAVENOUS AS NEEDED
Status: CANCELLED | OUTPATIENT
Start: 2019-03-06

## 2019-02-13 RX ORDER — DEXTROSE MONOHYDRATE 50 MG/ML
25 INJECTION, SOLUTION INTRAVENOUS CONTINUOUS
Status: CANCELLED
Start: 2019-03-06

## 2019-02-13 RX ORDER — HYDROCORTISONE SODIUM SUCCINATE 100 MG/2ML
100 INJECTION, POWDER, FOR SOLUTION INTRAMUSCULAR; INTRAVENOUS AS NEEDED
Status: CANCELLED | OUTPATIENT
Start: 2019-02-20

## 2019-02-13 RX ORDER — ONDANSETRON 2 MG/ML
8 INJECTION INTRAMUSCULAR; INTRAVENOUS AS NEEDED
Status: CANCELLED | OUTPATIENT
Start: 2019-02-20

## 2019-02-13 RX ORDER — SODIUM CHLORIDE 0.9 % (FLUSH) 0.9 %
10 SYRINGE (ML) INJECTION AS NEEDED
Status: CANCELLED
Start: 2019-02-20

## 2019-02-13 RX ORDER — ALBUTEROL SULFATE 0.83 MG/ML
2.5 SOLUTION RESPIRATORY (INHALATION) AS NEEDED
Status: CANCELLED
Start: 2019-02-20

## 2019-02-13 RX ORDER — SODIUM CHLORIDE 9 MG/ML
10 INJECTION INTRAMUSCULAR; INTRAVENOUS; SUBCUTANEOUS AS NEEDED
Status: CANCELLED | OUTPATIENT
Start: 2019-02-20

## 2019-02-13 RX ORDER — HEPARIN 100 UNIT/ML
300-500 SYRINGE INTRAVENOUS AS NEEDED
Status: CANCELLED
Start: 2019-02-22

## 2019-02-13 RX ORDER — DEXTROSE MONOHYDRATE 50 MG/ML
25 INJECTION, SOLUTION INTRAVENOUS CONTINUOUS
Status: CANCELLED
Start: 2019-02-20

## 2019-02-13 RX ORDER — FLUOROURACIL 50 MG/ML
400 INJECTION, SOLUTION INTRAVENOUS ONCE
Status: CANCELLED
Start: 2019-03-06 | End: 2019-03-06

## 2019-02-13 RX ORDER — SODIUM CHLORIDE 9 MG/ML
10 INJECTION INTRAMUSCULAR; INTRAVENOUS; SUBCUTANEOUS AS NEEDED
Status: CANCELLED | OUTPATIENT
Start: 2019-03-08

## 2019-02-13 RX ORDER — ALBUTEROL SULFATE 0.83 MG/ML
2.5 SOLUTION RESPIRATORY (INHALATION) AS NEEDED
Status: CANCELLED
Start: 2019-03-06

## 2019-02-13 RX ORDER — ACETAMINOPHEN 325 MG/1
650 TABLET ORAL AS NEEDED
Status: CANCELLED
Start: 2019-03-06

## 2019-02-13 RX ORDER — SODIUM CHLORIDE 0.9 % (FLUSH) 0.9 %
10 SYRINGE (ML) INJECTION AS NEEDED
Status: CANCELLED
Start: 2019-03-06

## 2019-02-20 ENCOUNTER — HOSPITAL ENCOUNTER (OUTPATIENT)
Dept: INFUSION THERAPY | Age: 59
End: 2019-02-20
Payer: COMMERCIAL

## 2019-02-20 ENCOUNTER — OFFICE VISIT (OUTPATIENT)
Dept: ONCOLOGY | Age: 59
End: 2019-02-20

## 2019-02-20 VITALS
WEIGHT: 188.7 LBS | DIASTOLIC BLOOD PRESSURE: 86 MMHG | HEIGHT: 66 IN | SYSTOLIC BLOOD PRESSURE: 118 MMHG | HEART RATE: 90 BPM | OXYGEN SATURATION: 97 % | TEMPERATURE: 97.5 F | RESPIRATION RATE: 20 BRPM | BODY MASS INDEX: 30.33 KG/M2

## 2019-02-20 DIAGNOSIS — C20 RECTAL CANCER METASTASIZED TO INTRAPELVIC LYMPH NODE (HCC): Chronic | ICD-10-CM

## 2019-02-20 DIAGNOSIS — K21.9 GASTROESOPHAGEAL REFLUX DISEASE WITHOUT ESOPHAGITIS: Primary | ICD-10-CM

## 2019-02-20 DIAGNOSIS — C77.5 RECTAL CANCER METASTASIZED TO INTRAPELVIC LYMPH NODE (HCC): Chronic | ICD-10-CM

## 2019-02-20 RX ORDER — CAPECITABINE 500 MG/1
TABLET, FILM COATED ORAL
Qty: 84 TAB | Refills: 0 | Status: ON HOLD | OUTPATIENT
Start: 2019-02-20 | End: 2019-04-01

## 2019-02-20 RX ORDER — PANTOPRAZOLE SODIUM 20 MG/1
20 TABLET, DELAYED RELEASE ORAL DAILY
Qty: 30 TAB | Refills: 2 | Status: SHIPPED | OUTPATIENT
Start: 2019-02-20 | End: 2019-12-09

## 2019-02-20 RX ORDER — CAPECITABINE 150 MG/1
TABLET, FILM COATED ORAL
Qty: 28 TAB | Refills: 0 | Status: ON HOLD | OUTPATIENT
Start: 2019-02-20 | End: 2019-04-01

## 2019-02-20 NOTE — PROGRESS NOTES
[facility] [place] at [place] [address] [address], [address], [place]: [phone]  F: [phone]    Reason for Visit:   Cresencio Jeans is a 63 y.o. female who is seen in follow up for Rectal cancer. Treatment History:   · 6/29/18-colonoscopy was notable for a bleeding mass of malignant appearance in the mid rectum and distal rectum at a distance between 4 cm to 11 cm from the anus. Caused partial obstruction. · 7/6/18-CT chest abdomen pelvis showed rectal thickening, some perirectal stranding and several subcentimeter perirectal lymph nodes  · 7/11/18: Rectal ultrasound showed a partially circumferential mass seen in the distal rectum extending through the muscularis propria, 2 small lymph nodes about 6 mm in size adjacent to the mass. · 8/7/18~ 9/20/18-  Xeloda with Radiation  · 12/31/18: Hand-assisted laparoscopic low anterior resection- 0/17 nodes, no residual cancer and negative margins  · 2/6/19: adjuvant FOLFOX    History of Present Illness:   Patient is a 63 y.o. female who had not had a screening colonoscopy developed constipation and subsequently BRBPR x 5-6 months. She wondered if this was secondary to L thyroxine. However symptoms continued to progress and she was referred to GI for further evaluation. Further investigations as above. Completed neoadjuvant chemoradiation and comes after LAR on 12/3/1/18 by Dr. Adriana Flores. Presents today after cycle 1 of FOLFOX. She had grade 2 nausea with vomiting up until a few days ago. She threw up about 2-3 times a day. She was able to eat bland soft foods such as mashed potatoes but has lost 6 lbs since we last saw her. She took dexamethasone as prescribed on days 2 and 3 of chemotherapy. She took zofran in the morning and in the evening. Compazine was called in and she tried this and it did not help.  Since infusion she has been very sensitive to smell and can only dump ostomy bag if she has her mouth and nose covered. States stool in ostomy bag has not changed and she has seen no blood. She had cold sensitivity with fluids and prepping food. She had acid reflux and started taking nexium OTC which improved symptoms. She has began to feel back to her baseline over the last 3 days. She had no mouth sores, denies SOB, CP, or fevers/chills. She has concerns about the pump as this has really affected her QOL. She is hoping to go back to work soon but feels she will be unable to do this if she has the pump. She has 1 adopted daughter and supportive    Quit smoking in   Rare ETOH    No FH of cancer. Past Medical History:   Diagnosis Date    Asthma     SEASONAL ALLERGY INDUCED    GERD (gastroesophageal reflux disease)     Rectal cancer ([facility] [address] 2018    Diagnosed via colonoscopy on 2018. Staged as [id number]. Treated with neoadjuvant chemoradiation using Xeloda and 5040 cGy radiation and with the final radiation dose administered on 2018.  Thyroid disease     hyperthyroidism      Past Surgical History:   Procedure Laterality Date    HX COLONOSCOPY  2018    Dr. Cardoza Reason.  HX GI  2005    Laparoscopic cholecystectomy.  HX HEENT      Sinus surgery.  HX ORTHOPAEDIC Right 1998    Right carpal tunnel release.  HX ORTHOPAEDIC  1982    Right ulnar nerve repositioning.  IR INSERT TUNL CVC W PORT OVER 5 YEARS  2019      Social History     Tobacco Use    Smoking status: Former Smoker     Packs/day: 0.50     Years: 10.00     Pack years: 5.00     Last attempt to quit:      Years since quittin.1    Smokeless tobacco: Never Used   Substance Use Topics    Alcohol use: No      Family History   Problem Relation Age of Onset    Heart Disease Mother     Cancer Mother         CERVICAL    Hypertension Mother     Heart Disease Father     Hypertension Father      Current Outpatient Medications   Medication Sig    esomeprazole magnesium (NEXIUM PO) Take  by mouth.     lidocaine-prilocaine (EMLA) topical cream Apply  to affected area as needed for Pain.  dexamethasone (DECADRON) 4 mg tablet Take 8 mg by mouth See Admin Instructions. Take 2 tabs daily On day 2,3  after chemotherapy.  ondansetron (ZOFRAN ODT) 8 mg disintegrating tablet Take 1 Tab by mouth every eight (8) hours as needed for Nausea.  methIMAzole (TAPAZOLE) 5 mg tablet Take 5 mg by mouth nightly.  loratadine (CLARITIN) 10 mg tablet Take 10 mg by mouth daily as needed.  prochlorperazine (COMPAZINE) 5 mg tablet Take 1 Tab by mouth every six (6) hours as needed for Nausea.  oxyCODONE IR (ROXICODONE) 5 mg immediate release tablet Take 1 Tab by mouth every four (4) hours as needed. Max Daily Amount: 30 mg.    benzonatate (TESSALON) 100 mg capsule Take 1 Cap by mouth three (3) times daily as needed for Cough.  terconazole (TERAZOL 7) 0.4 % vaginal cream INSERT 1 APPLICATORFUL IN VAGINA EVERY EVENING FOR 7 DAYS IF NEEDED FOR YEAST INFECTION     No current facility-administered medications for this visit. No Known Allergies     Review of Systems: A complete review of systems was obtained, negative except as described above. Physical Exam:     Visit Vitals  /86 (BP 1 Location: Right arm, BP Patient Position: Sitting)   Pulse 90   Temp 97.5 °F (36.4 °C) (Oral)   Resp 20   Ht 5' 6\" (1.676 m)   Wt 188 lb 11.2 oz (85.6 kg)   SpO2 97%   BMI 30.46 kg/m²     ECOG PS: 0  General: No distress  Eyes: PERRL, anicteric sclerae  HENT: Atraumatic, OP clear  Neck: Supple  Lymphatic: No cervical, supraclavicular adenopathy  Respiratory: CTAB, normal respiratory effort  CV: Normal rate, regular rhythm, no murmurs, no peripheral edema  GI: Soft, nontender, nondistended, no masses, no hepatomegaly, no splenomegaly, Stoma noted  MS: Normal gait and station. Digits without clubbing or cyanosis. Skin: No rashes, ecchymoses, or petechiae.  Normal temperature, turgor, and texture; PAC site looks good  Psych: Alert, oriented, appropriate affect, normal judgment/insight    Results:     Lab Results   Component Value Date/Time    WBC 5.5 02/06/2019 10:15 AM    HGB 12.6 02/06/2019 10:15 AM    HCT 40.9 02/06/2019 10:15 AM    PLATELET 182 93/97/9369 10:15 AM    MCV 85.0 02/06/2019 10:15 AM    ABS. NEUTROPHILS 4.2 02/06/2019 10:15 AM     Lab Results   Component Value Date/Time    Sodium 138 02/06/2019 10:15 AM    Potassium 3.9 02/06/2019 10:15 AM    Chloride 106 02/06/2019 10:15 AM    CO2 22 02/06/2019 10:15 AM    Glucose 136 (H) 02/06/2019 10:15 AM    BUN 13 02/06/2019 10:15 AM    Creatinine 1.01 02/06/2019 10:15 AM    GFR est AA >60 02/06/2019 10:15 AM    GFR est non-AA 56 (L) 02/06/2019 10:15 AM    Calcium 9.1 02/06/2019 10:15 AM     Lab Results   Component Value Date/Time    Bilirubin, total 0.4 02/06/2019 10:15 AM    ALT (SGPT) 37 02/06/2019 10:15 AM    AST (SGOT) 25 02/06/2019 10:15 AM    Alk. phosphatase 122 (H) 02/06/2019 10:15 AM    Protein, total 7.6 02/06/2019 10:15 AM    Albumin 3.3 (L) 02/06/2019 10:15 AM    Globulin 4.3 (H) 02/06/2019 10:15 AM     Records reviewed and summarized above. Pathology report(s) reviewed above. Radiology report(s) reviewed above. 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 She poorly tolerated cycle 1 with grade 2 nausea and vomiting and grade 1 neuropathy. Also CADD pump affected her QOL and she wishes to pursue a chemo treatment that does not involve a CADD. Will transition to CAPOX and plan for 4 total cycles . She understands she will still be receiving oxaliplatin. 2) Hypothyroidism  Controlled     3) Anemia  Post op.  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 CEA monthly. · Dexamethasone 8mg daily on days 2 and 3 of chemotherapy  · Zofran and compazine PRN  · Consent and teach when she returns for cycle 1  · PPI    RTC in ~1 week for cycle 1 of CAPOX    I appreciate the opportunity to participate in Ms. Billie Galeano's care.     Signed By: Devin Morataya MD

## 2019-02-20 NOTE — Clinical Note
FYI we are switching to CAPOX. She has taken xeloda before but only on xrt date.  She will need teach and consent when she returns (~1 week or whenever she receives drug and we can start)

## 2019-02-20 NOTE — PROGRESS NOTES
Noah Lara is a 63 y.o. female here today for follow up, rectal cancer. 1. Have you been to the ER, urgent care clinic since your last visit? Hospitalized since your last visit? No     2. Have you seen or consulted any other health care providers outside of the [address] [place] [address] since your last visit? Include any pap smears or colon screening.  No

## 2019-02-21 ENCOUNTER — DOCUMENTATION ONLY (OUTPATIENT)
Dept: ONCOLOGY | Age: 59
End: 2019-02-21

## 2019-02-21 NOTE — PROGRESS NOTES
xeloda faxed to [facility] for processing.   Confirmation rec'd    Anticipated start date 2/27

## 2019-02-22 ENCOUNTER — APPOINTMENT (OUTPATIENT)
Dept: INFUSION THERAPY | Age: 59
End: 2019-02-22
Payer: COMMERCIAL

## 2019-02-26 RX ORDER — SODIUM CHLORIDE 0.9 % (FLUSH) 0.9 %
10 SYRINGE (ML) INJECTION AS NEEDED
Status: CANCELLED
Start: 2019-03-01

## 2019-02-26 RX ORDER — SODIUM CHLORIDE 9 MG/ML
10 INJECTION INTRAMUSCULAR; INTRAVENOUS; SUBCUTANEOUS AS NEEDED
Status: CANCELLED | OUTPATIENT
Start: 2019-03-01

## 2019-02-26 RX ORDER — HEPARIN 100 UNIT/ML
300-500 SYRINGE INTRAVENOUS AS NEEDED
Status: CANCELLED
Start: 2019-03-01

## 2019-02-26 RX ORDER — EPINEPHRINE 1 MG/ML
0.3 INJECTION, SOLUTION, CONCENTRATE INTRAVENOUS AS NEEDED
Status: CANCELLED | OUTPATIENT
Start: 2019-03-01

## 2019-02-26 RX ORDER — ONDANSETRON 2 MG/ML
8 INJECTION INTRAMUSCULAR; INTRAVENOUS ONCE
Status: CANCELLED | OUTPATIENT
Start: 2019-03-01 | End: 2019-03-01

## 2019-02-26 RX ORDER — ALBUTEROL SULFATE 0.83 MG/ML
2.5 SOLUTION RESPIRATORY (INHALATION) AS NEEDED
Status: CANCELLED
Start: 2019-03-01

## 2019-02-26 RX ORDER — DIPHENHYDRAMINE HYDROCHLORIDE 50 MG/ML
50 INJECTION, SOLUTION INTRAMUSCULAR; INTRAVENOUS AS NEEDED
Status: CANCELLED
Start: 2019-03-01

## 2019-02-26 RX ORDER — DEXTROSE MONOHYDRATE 50 MG/ML
25 INJECTION, SOLUTION INTRAVENOUS CONTINUOUS
Status: CANCELLED
Start: 2019-03-01

## 2019-02-26 RX ORDER — HYDROCORTISONE SODIUM SUCCINATE 100 MG/2ML
100 INJECTION, POWDER, FOR SOLUTION INTRAMUSCULAR; INTRAVENOUS AS NEEDED
Status: CANCELLED | OUTPATIENT
Start: 2019-03-01

## 2019-02-26 RX ORDER — ACETAMINOPHEN 325 MG/1
650 TABLET ORAL AS NEEDED
Status: CANCELLED
Start: 2019-03-01

## 2019-02-26 RX ORDER — ONDANSETRON 2 MG/ML
8 INJECTION INTRAMUSCULAR; INTRAVENOUS AS NEEDED
Status: CANCELLED | OUTPATIENT
Start: 2019-03-01

## 2019-02-27 ENCOUNTER — TELEPHONE (OUTPATIENT)
Dept: ONCOLOGY | Age: 59
End: 2019-02-27

## 2019-02-27 RX ORDER — HYDROCORTISONE SODIUM SUCCINATE 100 MG/2ML
100 INJECTION, POWDER, FOR SOLUTION INTRAMUSCULAR; INTRAVENOUS AS NEEDED
Status: CANCELLED | OUTPATIENT
Start: 2019-03-22

## 2019-02-27 RX ORDER — DIPHENHYDRAMINE HYDROCHLORIDE 50 MG/ML
50 INJECTION, SOLUTION INTRAMUSCULAR; INTRAVENOUS AS NEEDED
Status: CANCELLED
Start: 2019-03-22

## 2019-02-27 RX ORDER — ACETAMINOPHEN 325 MG/1
650 TABLET ORAL AS NEEDED
Status: CANCELLED
Start: 2019-03-22

## 2019-02-27 RX ORDER — DEXTROSE MONOHYDRATE 50 MG/ML
25 INJECTION, SOLUTION INTRAVENOUS CONTINUOUS
Status: CANCELLED
Start: 2019-03-22

## 2019-02-27 RX ORDER — EPINEPHRINE 1 MG/ML
0.3 INJECTION, SOLUTION, CONCENTRATE INTRAVENOUS AS NEEDED
Status: CANCELLED | OUTPATIENT
Start: 2019-03-22

## 2019-02-27 RX ORDER — ONDANSETRON 2 MG/ML
8 INJECTION INTRAMUSCULAR; INTRAVENOUS ONCE
Status: CANCELLED | OUTPATIENT
Start: 2019-03-22 | End: 2019-03-22

## 2019-02-27 RX ORDER — SODIUM CHLORIDE 0.9 % (FLUSH) 0.9 %
10 SYRINGE (ML) INJECTION AS NEEDED
Status: CANCELLED
Start: 2019-03-22

## 2019-02-27 RX ORDER — SODIUM CHLORIDE 9 MG/ML
10 INJECTION INTRAMUSCULAR; INTRAVENOUS; SUBCUTANEOUS AS NEEDED
Status: CANCELLED | OUTPATIENT
Start: 2019-03-22

## 2019-02-27 RX ORDER — ONDANSETRON 2 MG/ML
8 INJECTION INTRAMUSCULAR; INTRAVENOUS AS NEEDED
Status: CANCELLED | OUTPATIENT
Start: 2019-03-22

## 2019-02-27 RX ORDER — HEPARIN 100 UNIT/ML
300-500 SYRINGE INTRAVENOUS AS NEEDED
Status: CANCELLED
Start: 2019-03-22

## 2019-02-27 RX ORDER — ALBUTEROL SULFATE 0.83 MG/ML
2.5 SOLUTION RESPIRATORY (INHALATION) AS NEEDED
Status: CANCELLED
Start: 2019-03-22

## 2019-02-27 NOTE — TELEPHONE ENCOUNTER
STACEY verfied. Pt will be receiving xeloda today not later than tomorrow. She is anxious to get started.

## 2019-03-01 ENCOUNTER — HOSPITAL ENCOUNTER (OUTPATIENT)
Dept: INFUSION THERAPY | Age: 59
Discharge: HOME | End: 2019-03-01
Payer: COMMERCIAL

## 2019-03-01 ENCOUNTER — OFFICE VISIT (OUTPATIENT)
Dept: ONCOLOGY | Age: 59
End: 2019-03-01

## 2019-03-01 VITALS
BODY MASS INDEX: 30.86 KG/M2 | SYSTOLIC BLOOD PRESSURE: 112 MMHG | TEMPERATURE: 98 F | HEIGHT: 66 IN | HEART RATE: 93 BPM | RESPIRATION RATE: 20 BRPM | WEIGHT: 192 LBS | OXYGEN SATURATION: 96 % | DIASTOLIC BLOOD PRESSURE: 74 MMHG

## 2019-03-01 VITALS
TEMPERATURE: 96.7 F | DIASTOLIC BLOOD PRESSURE: 85 MMHG | SYSTOLIC BLOOD PRESSURE: 132 MMHG | HEIGHT: 66 IN | RESPIRATION RATE: 16 BRPM | HEART RATE: 96 BPM | BODY MASS INDEX: 31.08 KG/M2 | WEIGHT: 193.4 LBS

## 2019-03-01 DIAGNOSIS — C20 RECTAL CANCER METASTASIZED TO INTRAPELVIC LYMPH NODE (HCC): Primary | ICD-10-CM

## 2019-03-01 DIAGNOSIS — C77.5 RECTAL CANCER METASTASIZED TO INTRAPELVIC LYMPH NODE (HCC): Primary | Chronic | ICD-10-CM

## 2019-03-01 DIAGNOSIS — Z51.11 CHEMOTHERAPY MANAGEMENT, ENCOUNTER FOR: ICD-10-CM

## 2019-03-01 DIAGNOSIS — C77.5 RECTAL CANCER METASTASIZED TO INTRAPELVIC LYMPH NODE (HCC): Primary | ICD-10-CM

## 2019-03-01 DIAGNOSIS — C20 RECTAL CANCER METASTASIZED TO INTRAPELVIC LYMPH NODE (HCC): Primary | Chronic | ICD-10-CM

## 2019-03-01 LAB
ALBUMIN SERPL-MCNC: 3.3 G/DL (ref 3.5–5)
ALBUMIN/GLOB SERPL: 0.8 {RATIO} (ref 1.1–2.2)
ALP SERPL-CCNC: 123 U/L (ref 45–117)
ALT SERPL-CCNC: 39 U/L (ref 12–78)
ANION GAP SERPL CALC-SCNC: 11 MMOL/L (ref 5–15)
AST SERPL-CCNC: 26 U/L (ref 15–37)
BASOPHILS # BLD: 0 K/UL (ref 0–0.1)
BASOPHILS NFR BLD: 1 % (ref 0–1)
BILIRUB SERPL-MCNC: 0.3 MG/DL (ref 0.2–1)
BUN SERPL-MCNC: 9 MG/DL (ref 6–20)
BUN/CREAT SERPL: 10 (ref 12–20)
CALCIUM SERPL-MCNC: 8.9 MG/DL (ref 8.5–10.1)
CEA SERPL-MCNC: 0.7 NG/ML
CHLORIDE SERPL-SCNC: 104 MMOL/L (ref 97–108)
CO2 SERPL-SCNC: 23 MMOL/L (ref 21–32)
CREAT SERPL-MCNC: 0.88 MG/DL (ref 0.55–1.02)
DIFFERENTIAL METHOD BLD: ABNORMAL
EOSINOPHIL # BLD: 0.1 K/UL (ref 0–0.4)
EOSINOPHIL NFR BLD: 3 % (ref 0–7)
ERYTHROCYTE [DISTWIDTH] IN BLOOD BY AUTOMATED COUNT: 15.8 % (ref 11.5–14.5)
GLOBULIN SER CALC-MCNC: 3.9 G/DL (ref 2–4)
GLUCOSE SERPL-MCNC: 121 MG/DL (ref 65–100)
HCT VFR BLD AUTO: 39 % (ref 35–47)
HGB BLD-MCNC: 12.2 G/DL (ref 11.5–16)
IMM GRANULOCYTES # BLD AUTO: 0.1 K/UL (ref 0–0.04)
IMM GRANULOCYTES NFR BLD AUTO: 2 % (ref 0–0.5)
LYMPHOCYTES # BLD: 0.8 K/UL (ref 0.8–3.5)
LYMPHOCYTES NFR BLD: 20 % (ref 12–49)
MCH RBC QN AUTO: 26.5 PG (ref 26–34)
MCHC RBC AUTO-ENTMCNC: 31.3 G/DL (ref 30–36.5)
MCV RBC AUTO: 84.6 FL (ref 80–99)
MONOCYTES # BLD: 0.4 K/UL (ref 0–1)
MONOCYTES NFR BLD: 9 % (ref 5–13)
NEUTS SEG # BLD: 2.6 K/UL (ref 1.8–8)
NEUTS SEG NFR BLD: 65 % (ref 32–75)
NRBC # BLD: 0 K/UL (ref 0–0.01)
NRBC BLD-RTO: 0 PER 100 WBC
PLATELET # BLD AUTO: 216 K/UL (ref 150–400)
PMV BLD AUTO: 10.4 FL (ref 8.9–12.9)
POTASSIUM SERPL-SCNC: 4 MMOL/L (ref 3.5–5.1)
PROT SERPL-MCNC: 7.2 G/DL (ref 6.4–8.2)
RBC # BLD AUTO: 4.61 M/UL (ref 3.8–5.2)
RBC MORPH BLD: ABNORMAL
SODIUM SERPL-SCNC: 138 MMOL/L (ref 136–145)
WBC # BLD AUTO: 4 K/UL (ref 3.6–11)

## 2019-03-01 PROCEDURE — 77030012965 HC NDL HUBR BBMI -A

## 2019-03-01 PROCEDURE — 80053 COMPREHEN METABOLIC PANEL: CPT

## 2019-03-01 PROCEDURE — 74011250636 HC RX REV CODE- 250/636

## 2019-03-01 PROCEDURE — 85025 COMPLETE CBC W/AUTO DIFF WBC: CPT

## 2019-03-01 PROCEDURE — 74011000258 HC RX REV CODE- 258: Performed by: INTERNAL MEDICINE

## 2019-03-01 PROCEDURE — 96375 TX/PRO/DX INJ NEW DRUG ADDON: CPT

## 2019-03-01 PROCEDURE — 74011250636 HC RX REV CODE- 250/636: Performed by: REGISTERED NURSE

## 2019-03-01 PROCEDURE — 74011250636 HC RX REV CODE- 250/636: Performed by: INTERNAL MEDICINE

## 2019-03-01 PROCEDURE — 82378 CARCINOEMBRYONIC ANTIGEN: CPT

## 2019-03-01 PROCEDURE — 74011000258 HC RX REV CODE- 258: Performed by: REGISTERED NURSE

## 2019-03-01 PROCEDURE — 96413 CHEMO IV INFUSION 1 HR: CPT

## 2019-03-01 PROCEDURE — 36415 COLL VENOUS BLD VENIPUNCTURE: CPT

## 2019-03-01 PROCEDURE — 96367 TX/PROPH/DG ADDL SEQ IV INF: CPT

## 2019-03-01 PROCEDURE — 96415 CHEMO IV INFUSION ADDL HR: CPT

## 2019-03-01 RX ORDER — ONDANSETRON 4 MG/1
4 TABLET, ORALLY DISINTEGRATING ORAL
Qty: 60 TAB | Refills: 2 | Status: SHIPPED | OUTPATIENT
Start: 2019-03-01 | End: 2019-04-15

## 2019-03-01 RX ORDER — ONDANSETRON 2 MG/ML
8 INJECTION INTRAMUSCULAR; INTRAVENOUS ONCE
Status: COMPLETED | OUTPATIENT
Start: 2019-03-01 | End: 2019-03-01

## 2019-03-01 RX ORDER — OLANZAPINE 10 MG/1
TABLET ORAL
Qty: 30 TAB | Refills: 0 | Status: ON HOLD | OUTPATIENT
Start: 2019-03-01 | End: 2019-04-01

## 2019-03-01 RX ORDER — DEXTROSE MONOHYDRATE 50 MG/ML
25 INJECTION, SOLUTION INTRAVENOUS CONTINUOUS
Status: DISPENSED | OUTPATIENT
Start: 2019-03-01 | End: 2019-03-02

## 2019-03-01 RX ADMIN — DEXAMETHASONE SODIUM PHOSPHATE 12 MG: 4 INJECTION, SOLUTION INTRAMUSCULAR; INTRAVENOUS at 13:47

## 2019-03-01 RX ADMIN — SODIUM CHLORIDE 150 MG: 900 INJECTION, SOLUTION INTRAVENOUS at 14:05

## 2019-03-01 RX ADMIN — ONDANSETRON 8 MG: 2 INJECTION, SOLUTION INTRAMUSCULAR; INTRAVENOUS at 13:46

## 2019-03-01 RX ADMIN — OXALIPLATIN 208 MG: 5 INJECTION, SOLUTION, CONCENTRATE INTRAVENOUS at 14:32

## 2019-03-01 RX ADMIN — DEXTROSE MONOHYDRATE 25 ML/HR: 5 INJECTION, SOLUTION INTRAVENOUS at 14:06

## 2019-03-01 NOTE — PATIENT INSTRUCTIONS
Nausea:  · Take zofran 4mg every 4-6 hours as needed for nausea. Okay to take on a regular schedule. · If you take the 8mg tablet previously prescribed, only take every 8 hours  · Take zyprexa 10mg as needed at BEDTIME for nausea (new prescription sent to your pharmacy)   · Take dexamethasone on days 2 and 3 of chemotherapy    If you are still nauseated please call us for further direction. We can always adjust medication and/or give you IV fluids and IV nausea medication.

## 2019-03-01 NOTE — PROGRESS NOTES
Del Piña is a 63 y.o. female here today for follow up, rectal cancer. 1. Have you been to the ER, urgent care clinic since your last visit? Hospitalized since your last visit? No     2. Have you seen or consulted any other health care providers outside of the [address] [place] [address] since your last visit? Include any pap smears or colon screening.  No

## 2019-03-01 NOTE — PROGRESS NOTES
[facility] at [place] [address] [address], [address], [place]: [phone]  F: [phone]    Reason for Visit:   Armand Wright is a 63 y.o. female who is seen in follow up for Rectal cancer. Treatment History:   · 6/29/18-colonoscopy was notable for a bleeding mass of malignant appearance in the mid rectum and distal rectum at a distance between 4 cm to 11 cm from the anus. Caused partial obstruction. · 7/6/18-CT chest abdomen pelvis showed rectal thickening, some perirectal stranding and several subcentimeter perirectal lymph nodes  · 7/11/18: Rectal ultrasound showed a partially circumferential mass seen in the distal rectum extending through the muscularis propria, 2 small lymph nodes about 6 mm in size adjacent to the mass. · 8/7/18~ 9/20/18-  Xeloda with Radiation  · 12/31/18: Hand-assisted laparoscopic low anterior resection- 0/17 nodes, no residual cancer and negative margins  · 2/6/19: adjuvant FOLFOX x 1 cycle  · 3/1/19: adjuvant CAPOX    History of Present Illness:   Patient is a 63 y.o. female who had not had a screening colonoscopy developed constipation and subsequently BRBPR x 5-6 months. She wondered if this was secondary to L thyroxine. However symptoms continued to progress and she was referred to GI for further evaluation. Further investigations as above. Completed neoadjuvant chemoradiation and comes after LAR on 12/3/1/18 by Dr. David Carter. She received one cycle of adjuvant FOLFOX and chose not to continue due to grade 2 nausea, grade 1 neuropathy, and concerns over QOL with CADD pump. Presents today for cycle 1 of CAPOX. She feels well today. Reports neuropathy remains stable and she is still sensitive to cold. She denies nausea/vomiting. Stool in ostomy bag is unchanged. Denies fevers/chills. No bleeding. Denies SOB, CP, or cough.      She has 1 adopted daughter and supportive    Quit smoking in 1992  Rare ETOH    No FH of cancer. Past Medical History:   Diagnosis Date    Asthma     SEASONAL ALLERGY INDUCED    GERD (gastroesophageal reflux disease)     Rectal cancer ([facility] [address] 2018    Diagnosed via colonoscopy on 2018. Staged as [id number]. Treated with neoadjuvant chemoradiation using Xeloda and 5040 cGy radiation and with the final radiation dose administered on 2018.  Thyroid disease     hyperthyroidism      Past Surgical History:   Procedure Laterality Date    HX COLONOSCOPY  2018    Dr. Leonel Florentino.  HX GI  2005    Laparoscopic cholecystectomy.  HX HEENT      Sinus surgery.  HX ORTHOPAEDIC Right 1998    Right carpal tunnel release.  HX ORTHOPAEDIC  1982    Right ulnar nerve repositioning.  IR INSERT TUNL CVC W PORT OVER 5 YEARS  2019      Social History     Tobacco Use    Smoking status: Former Smoker     Packs/day: 0.50     Years: 10.00     Pack years: 5.00     Last attempt to quit:      Years since quittin.1    Smokeless tobacco: Never Used   Substance Use Topics    Alcohol use: No      Family History   Problem Relation Age of Onset    Heart Disease Mother     Cancer Mother         CERVICAL    Hypertension Mother     Heart Disease Father     Hypertension Father      Current Outpatient Medications   Medication Sig    pantoprazole (PROTONIX) 20 mg tablet Take 1 Tab by mouth daily.  capecitabine (XELODA) 150 mg tablet Take 1 tab (150mg) twice daily on days 1-14 followed by 7 days off in a 21 day cycle    capecitabine (XELODA) 500 mg tablet Take 3 tabs (1,500mg) twice daily on days 1-14 followed by 7 days off in a 21 day cycle    prochlorperazine (COMPAZINE) 5 mg tablet Take 1 Tab by mouth every six (6) hours as needed for Nausea.  lidocaine-prilocaine (EMLA) topical cream Apply  to affected area as needed for Pain.  dexamethasone (DECADRON) 4 mg tablet Take 8 mg by mouth See Admin Instructions. Take 2 tabs daily On day 2,3  after chemotherapy.     ondansetron (ZOFRAN ODT) 8 mg disintegrating tablet Take 1 Tab by mouth every eight (8) hours as needed for Nausea.  methIMAzole (TAPAZOLE) 5 mg tablet Take 5 mg by mouth nightly.  loratadine (CLARITIN) 10 mg tablet Take 10 mg by mouth daily as needed.  oxyCODONE IR (ROXICODONE) 5 mg immediate release tablet Take 1 Tab by mouth every four (4) hours as needed. Max Daily Amount: 30 mg.    benzonatate (TESSALON) 100 mg capsule Take 1 Cap by mouth three (3) times daily as needed for Cough.  terconazole (TERAZOL 7) 0.4 % vaginal cream INSERT 1 APPLICATORFUL IN VAGINA EVERY EVENING FOR 7 DAYS IF NEEDED FOR YEAST INFECTION     No current facility-administered medications for this visit. No Known Allergies     Review of Systems: A complete review of systems was obtained, negative except as described above. Physical Exam:     Visit Vitals  /74 (BP 1 Location: Right arm, BP Patient Position: Sitting)   Pulse 93   Temp 98 °F (36.7 °C) (Oral)   Resp 20   Ht 5' 6\" (1.676 m)   Wt 192 lb (87.1 kg)   SpO2 96%   BMI 30.99 kg/m²     ECOG PS: 0  General: No distress  Eyes: PERRL, anicteric sclerae  HENT: Atraumatic, OP clear  Neck: Supple  Lymphatic: No cervical, supraclavicular adenopathy  Respiratory: CTAB, normal respiratory effort  CV: Normal rate, regular rhythm, no murmurs, no peripheral edema  GI: Soft, nontender, nondistended, no masses, no hepatomegaly, no splenomegaly, Stoma noted  MS: Normal gait and station. Digits without clubbing or cyanosis. Skin: No rashes, ecchymoses, or petechiae. Normal temperature, turgor, and texture; PAC site looks good  Psych: Alert, oriented, appropriate affect, normal judgment/insight    Results:     Lab Results   Component Value Date/Time    WBC 4.0 03/01/2019 11:06 AM    HGB 12.2 03/01/2019 11:06 AM    HCT 39.0 03/01/2019 11:06 AM    PLATELET 185 02/[birth year]/1950 11:06 AM    MCV 84.6 03/01/2019 11:06 AM    ABS.  NEUTROPHILS PENDING 03/01/2019 11:06 AM     Lab Results Component Value Date/Time    Sodium 138 02/06/2019 10:15 AM    Potassium 3.9 02/06/2019 10:15 AM    Chloride 106 02/06/2019 10:15 AM    CO2 22 02/06/2019 10:15 AM    Glucose 136 (H) 02/06/2019 10:15 AM    BUN 13 02/06/2019 10:15 AM    Creatinine 1.01 02/06/2019 10:15 AM    GFR est AA >60 02/06/2019 10:15 AM    GFR est non-AA 56 (L) 02/06/2019 10:15 AM    Calcium 9.1 02/06/2019 10:15 AM     Lab Results   Component Value Date/Time    Bilirubin, total 0.4 02/06/2019 10:15 AM    ALT (SGPT) 37 02/06/2019 10:15 AM    AST (SGOT) 25 02/06/2019 10:15 AM    Alk. phosphatase 122 (H) 02/06/2019 10:15 AM    Protein, total 7.6 02/06/2019 10:15 AM    Albumin 3.3 (L) 02/06/2019 10:15 AM    Globulin 4.3 (H) 02/06/2019 10:15 AM     Records reviewed and summarized above. Pathology report(s) reviewed above. Radiology report(s) reviewed above. 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 She poorly tolerated cycle 1 with grade 2 nausea and vomiting and grade 1 neuropathy. Also CADD pump affected her QOL and she wishes to pursue a chemo treatment that does not involve a CADD. Decided to transition to CAPOX for 4 total cycles. Today is cycle 1 of CAPOX. She understands she will be receiving oxaliplatin at a higher dose. She wishes to have her ostomy reversed as soon as possible. Discussed with her today that this should wait until chemotherapy is completed. She understands. 2) Hypothyroidism  Controlled     3) Anemia  Post op.  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 CEA monthly. · Dexamethasone 8mg daily on days 2 and 3 of chemotherapy  · Zofran PRN  · Zyprexa QHS PRN  · Continue PPI    RTC in 3 weeks for cycle 2 of CAPOX    I appreciate the opportunity to participate in Ms. Dl Galeano's care.     Signed By: Gavin Kirby MD

## 2019-03-01 NOTE — PROGRESS NOTES
1100 Pt admit to [facility] for C1 CAPOX ambulatory in stable condition. Assessment completed. No new concerns voiced. Port accessed and flushed with positive blood return. Labs drawn per order and sent for processing. Patient to MD office. Patient returned top opic, Normal Saline started at [facility]. Patient finished treatment and was walking out and started experiencing tingling in her hands and her throat was sore. Lyly York NP notified, advised patient the tingling was from the medication and if she had any concerns to call the on call physician or if she experienced any trouble breathing to please go to the emergency room, patient verbalized understanding. Visit Vitals  BP (!) 126/92   Pulse (!) 114   Temp 96.7 °F (35.9 °C)   Resp 16   Wt 87.7 kg (193 lb 6.4 oz)   Breastfeeding? No   BMI 31.22 kg/m²       Medications:  Normal Saline KVO  Dextrose 5%  Zofran IV Push  Decadron IVPB  Emend IVPB   Oxaliplatin  Heparin  Flush      1730 Pt tolerated treatment well. Port maintained positive blood return throughout treatment, flushed with positive blood return at conclusion and port heparinized and de-accessed per protocol. D/c home ambulatory in no distress. Pt aware of next [facility] appointment scheduled for 3/22/19. Recent Results (from the past 12 hour(s))   CBC WITH AUTOMATED DIFF    Collection Time: 03/01/19 11:06 AM   Result Value Ref Range    WBC 4.0 3.6 - 11.0 K/uL    RBC 4.61 3.80 - 5.20 M/uL    HGB 12.2 11.5 - 16.0 g/dL    HCT 39.0 35.0 - 47.0 %    MCV 84.6 80.0 - 99.0 FL    MCH 26.5 26.0 - 34.0 PG    MCHC 31.3 30.0 - 36.5 g/dL    RDW 15.8 (H) 11.5 - 14.5 %    PLATELET 341 648 - 962 K/uL    MPV 10.4 8.9 - 12.9 FL    NRBC 0.0 0  WBC    ABSOLUTE NRBC 0.00 0.00 - 0.01 K/uL    NEUTROPHILS 65 32 - 75 %    LYMPHOCYTES 20 12 - 49 %    MONOCYTES 9 5 - 13 %    EOSINOPHILS 3 0 - 7 %    BASOPHILS 1 0 - 1 %    IMMATURE GRANULOCYTES 2 (H) 0.0 - 0.5 %    ABS. NEUTROPHILS 2.6 1.8 - 8.0 K/UL    ABS.  LYMPHOCYTES 0.8 0.8 - 3.5 K/UL    ABS. MONOCYTES 0.4 0.0 - 1.0 K/UL    ABS. EOSINOPHILS 0.1 0.0 - 0.4 K/UL    ABS. BASOPHILS 0.0 0.0 - 0.1 K/UL    ABS. IMM. GRANS. 0.1 (H) 0.00 - 0.04 K/UL    DF SMEAR SCANNED      RBC COMMENTS ANISOCYTOSIS  1+       METABOLIC PANEL, COMPREHENSIVE    Collection Time: 03/01/19 11:06 AM   Result Value Ref Range    Sodium 138 136 - 145 mmol/L    Potassium 4.0 3.5 - 5.1 mmol/L    Chloride 104 97 - 108 mmol/L    CO2 23 21 - 32 mmol/L    Anion gap 11 5 - 15 mmol/L    Glucose 121 (H) 65 - 100 mg/dL    BUN 9 6 - 20 MG/DL    Creatinine 0.88 0.55 - 1.02 MG/DL    BUN/Creatinine ratio 10 (L) 12 - 20      GFR est AA >60 >60 ml/min/1.73m2    GFR est non-AA >60 >60 ml/min/1.73m2    Calcium 8.9 8.5 - 10.1 MG/DL    Bilirubin, total 0.3 0.2 - 1.0 MG/DL    ALT (SGPT) 39 12 - 78 U/L    AST (SGOT) 26 15 - 37 U/L    Alk.  phosphatase 123 (H) 45 - 117 U/L    Protein, total 7.2 6.4 - 8.2 g/dL    Albumin 3.3 (L) 3.5 - 5.0 g/dL    Globulin 3.9 2.0 - 4.0 g/dL    A-G Ratio 0.8 (L) 1.1 - 2.2     CEA    Collection Time: 03/01/19 11:06 AM   Result Value Ref Range    CEA 0.7 ng/mL

## 2019-03-06 ENCOUNTER — APPOINTMENT (OUTPATIENT)
Dept: INFUSION THERAPY | Age: 59
End: 2019-03-06

## 2019-03-08 ENCOUNTER — APPOINTMENT (OUTPATIENT)
Dept: INFUSION THERAPY | Age: 59
End: 2019-03-08

## 2019-03-20 ENCOUNTER — APPOINTMENT (OUTPATIENT)
Dept: INFUSION THERAPY | Age: 59
End: 2019-03-20

## 2019-03-20 ENCOUNTER — HOSPITAL ENCOUNTER (OUTPATIENT)
Dept: GENERAL RADIOLOGY | Age: 59
Discharge: HOME | End: 2019-03-20
Attending: COLON & RECTAL SURGERY
Payer: COMMERCIAL

## 2019-03-20 DIAGNOSIS — C20 MALIGNANT NEOPLASM OF RECTUM (HCC): ICD-10-CM

## 2019-03-20 PROCEDURE — 74270 X-RAY XM COLON 1CNTRST STD: CPT

## 2019-03-20 PROCEDURE — 74011636320 HC RX REV CODE- 636/320: Performed by: RADIOLOGY

## 2019-03-20 RX ADMIN — DIATRIZOATE MEGLUMINE AND DIATRIZOATE SODIUM 360 ML: 660; 100 LIQUID ORAL; RECTAL at 10:35

## 2019-03-20 RX ADMIN — IOTHALAMATE MEGLUMINE 750 ML: 172 INJECTION URETERAL at 10:35

## 2019-03-22 ENCOUNTER — APPOINTMENT (OUTPATIENT)
Dept: INFUSION THERAPY | Age: 59
End: 2019-03-22

## 2019-03-22 ENCOUNTER — OFFICE VISIT (OUTPATIENT)
Dept: ONCOLOGY | Age: 59
End: 2019-03-22

## 2019-03-22 ENCOUNTER — HOSPITAL ENCOUNTER (OUTPATIENT)
Dept: INFUSION THERAPY | Age: 59
Discharge: HOME | End: 2019-03-22
Payer: COMMERCIAL

## 2019-03-22 VITALS
OXYGEN SATURATION: 98 % | HEIGHT: 66 IN | SYSTOLIC BLOOD PRESSURE: 123 MMHG | HEART RATE: 92 BPM | TEMPERATURE: 97.8 F | WEIGHT: 196 LBS | RESPIRATION RATE: 16 BRPM | DIASTOLIC BLOOD PRESSURE: 85 MMHG | BODY MASS INDEX: 31.5 KG/M2

## 2019-03-22 VITALS
HEART RATE: 90 BPM | HEIGHT: 66 IN | TEMPERATURE: 96.9 F | DIASTOLIC BLOOD PRESSURE: 85 MMHG | WEIGHT: 197.2 LBS | RESPIRATION RATE: 18 BRPM | BODY MASS INDEX: 31.69 KG/M2 | SYSTOLIC BLOOD PRESSURE: 132 MMHG

## 2019-03-22 DIAGNOSIS — C77.5 RECTAL CANCER METASTASIZED TO INTRAPELVIC LYMPH NODE (HCC): Primary | Chronic | ICD-10-CM

## 2019-03-22 DIAGNOSIS — E66.9 OBESITY (BMI 30.0-34.9): ICD-10-CM

## 2019-03-22 DIAGNOSIS — C20 RECTAL CANCER METASTASIZED TO INTRAPELVIC LYMPH NODE (HCC): Primary | Chronic | ICD-10-CM

## 2019-03-22 DIAGNOSIS — C20 RECTAL CANCER METASTASIZED TO INTRAPELVIC LYMPH NODE (HCC): Primary | ICD-10-CM

## 2019-03-22 DIAGNOSIS — C77.5 RECTAL CANCER METASTASIZED TO INTRAPELVIC LYMPH NODE (HCC): Primary | ICD-10-CM

## 2019-03-22 DIAGNOSIS — E66.9 CLASS 1 OBESITY WITH SERIOUS COMORBIDITY AND BODY MASS INDEX (BMI) OF 33.0 TO 33.9 IN ADULT, UNSPECIFIED OBESITY TYPE: ICD-10-CM

## 2019-03-22 LAB
ALBUMIN SERPL-MCNC: 3.1 G/DL (ref 3.5–5)
ALBUMIN/GLOB SERPL: 0.9 {RATIO} (ref 1.1–2.2)
ALP SERPL-CCNC: 105 U/L (ref 45–117)
ALT SERPL-CCNC: 33 U/L (ref 12–78)
ANION GAP SERPL CALC-SCNC: 8 MMOL/L (ref 5–15)
AST SERPL-CCNC: 23 U/L (ref 15–37)
BASOPHILS # BLD: 0 K/UL (ref 0–0.1)
BASOPHILS NFR BLD: 1 % (ref 0–1)
BILIRUB SERPL-MCNC: 0.4 MG/DL (ref 0.2–1)
BUN SERPL-MCNC: 11 MG/DL (ref 6–20)
BUN/CREAT SERPL: 13 (ref 12–20)
CALCIUM SERPL-MCNC: 8.9 MG/DL (ref 8.5–10.1)
CHLORIDE SERPL-SCNC: 106 MMOL/L (ref 97–108)
CO2 SERPL-SCNC: 24 MMOL/L (ref 21–32)
CREAT SERPL-MCNC: 0.85 MG/DL (ref 0.55–1.02)
DIFFERENTIAL METHOD BLD: ABNORMAL
EOSINOPHIL # BLD: 0.2 K/UL (ref 0–0.4)
EOSINOPHIL NFR BLD: 4 % (ref 0–7)
ERYTHROCYTE [DISTWIDTH] IN BLOOD BY AUTOMATED COUNT: 17.2 % (ref 11.5–14.5)
GLOBULIN SER CALC-MCNC: 3.5 G/DL (ref 2–4)
GLUCOSE SERPL-MCNC: 120 MG/DL (ref 65–100)
HCT VFR BLD AUTO: 35.7 % (ref 35–47)
HGB BLD-MCNC: 11.2 G/DL (ref 11.5–16)
IMM GRANULOCYTES # BLD AUTO: 0 K/UL (ref 0–0.04)
IMM GRANULOCYTES NFR BLD AUTO: 0 % (ref 0–0.5)
LYMPHOCYTES # BLD: 0.7 K/UL (ref 0.8–3.5)
LYMPHOCYTES NFR BLD: 17 % (ref 12–49)
MCH RBC QN AUTO: 26.9 PG (ref 26–34)
MCHC RBC AUTO-ENTMCNC: 31.4 G/DL (ref 30–36.5)
MCV RBC AUTO: 85.8 FL (ref 80–99)
MONOCYTES # BLD: 0.3 K/UL (ref 0–1)
MONOCYTES NFR BLD: 6 % (ref 5–13)
NEUTS SEG # BLD: 3.1 K/UL (ref 1.8–8)
NEUTS SEG NFR BLD: 72 % (ref 32–75)
NRBC # BLD: 0 K/UL (ref 0–0.01)
NRBC BLD-RTO: 0 PER 100 WBC
PLATELET # BLD AUTO: 221 K/UL (ref 150–400)
PMV BLD AUTO: 11.1 FL (ref 8.9–12.9)
POTASSIUM SERPL-SCNC: 3.8 MMOL/L (ref 3.5–5.1)
PROT SERPL-MCNC: 6.6 G/DL (ref 6.4–8.2)
RBC # BLD AUTO: 4.16 M/UL (ref 3.8–5.2)
RBC MORPH BLD: ABNORMAL
RBC MORPH BLD: ABNORMAL
SODIUM SERPL-SCNC: 138 MMOL/L (ref 136–145)
WBC # BLD AUTO: 4.3 K/UL (ref 3.6–11)

## 2019-03-22 PROCEDURE — 80053 COMPREHEN METABOLIC PANEL: CPT

## 2019-03-22 PROCEDURE — 85025 COMPLETE CBC W/AUTO DIFF WBC: CPT

## 2019-03-22 PROCEDURE — 77030012965 HC NDL HUBR BBMI -A

## 2019-03-22 PROCEDURE — 74011000250 HC RX REV CODE- 250: Performed by: REGISTERED NURSE

## 2019-03-22 PROCEDURE — 36415 COLL VENOUS BLD VENIPUNCTURE: CPT

## 2019-03-22 PROCEDURE — 36591 DRAW BLOOD OFF VENOUS DEVICE: CPT

## 2019-03-22 RX ORDER — SODIUM CHLORIDE 9 MG/ML
10 INJECTION INTRAMUSCULAR; INTRAVENOUS; SUBCUTANEOUS AS NEEDED
Status: ACTIVE | OUTPATIENT
Start: 2019-03-22 | End: 2019-03-22

## 2019-03-22 RX ORDER — HEPARIN 100 UNIT/ML
300-500 SYRINGE INTRAVENOUS AS NEEDED
Status: ACTIVE | OUTPATIENT
Start: 2019-03-22 | End: 2019-03-22

## 2019-03-22 RX ORDER — SODIUM CHLORIDE 0.9 % (FLUSH) 0.9 %
10 SYRINGE (ML) INJECTION AS NEEDED
Status: ACTIVE | OUTPATIENT
Start: 2019-03-22 | End: 2019-03-22

## 2019-03-22 RX ADMIN — Medication 10 ML: at 11:20

## 2019-03-22 RX ADMIN — SODIUM CHLORIDE 10 ML: 9 INJECTION, SOLUTION INTRAMUSCULAR; INTRAVENOUS; SUBCUTANEOUS at 11:15

## 2019-03-22 NOTE — PROGRESS NOTES
Wilmer Donaldson is a 63 y.o. female here today for follow up, rectal cancer. 1. Have you been to the ER, urgent care clinic since your last visit? Hospitalized since your last visit? No     2. Have you seen or consulted any other health care providers outside of the [address] [place] [address] since your last visit? Include any pap smears or colon screening.  No

## 2019-03-22 NOTE — PROGRESS NOTES
[facility] [place] at [place] [address] [address], [address], [place]: [phone]  F: [phone]    Reason for Visit:   Iain Ku is a 63 y.o. female who is seen in follow up for Rectal cancer. Treatment History:   · 6/29/18-colonoscopy was notable for a bleeding mass of malignant appearance in the mid rectum and distal rectum at a distance between 4 cm to 11 cm from the anus. Caused partial obstruction. · 7/6/18-CT chest abdomen pelvis showed rectal thickening, some perirectal stranding and several subcentimeter perirectal lymph nodes  · 7/11/18: Rectal ultrasound showed a partially circumferential mass seen in the distal rectum extending through the muscularis propria, 2 small lymph nodes about 6 mm in size adjacent to the mass. · 8/7/18~ 9/20/18-  Xeloda with Radiation  · 12/31/18: Hand-assisted laparoscopic low anterior resection- 0/17 nodes, no residual cancer and negative margins  · 2/6/19: adjuvant FOLFOX x 1 cycle  · 3/1/19: adjuvant CAPOX    History of Present Illness:   Patient is a 63 y.o. female who had not had a screening colonoscopy developed constipation and subsequently BRBPR x 5-6 months. She wondered if this was secondary to L thyroxine. However symptoms continued to progress and she was referred to GI for further evaluation. Further investigations as above. Completed neoadjuvant chemoradiation and comes after LAR on 12/3/1/18 by Dr. Tristen Quinones. She received one cycle of adjuvant FOLFOX and chose not to continue due to grade 2 nausea, grade 1 neuropathy, and concerns over QOL with CADD pump. Presents today for cycle 3 of CAPOX. She got weakness in her arms and legs with cold sensitivity right after infusion, her throat was closing up, thereafter her weakness in her legs and arms lasted 5 days, no falls. She No rash. She could not swallow Xeloda. She could not drink even room temperature water. She has skipped all Xeloda . Still shaking some. No nausea. No diarrhea. She has 1 adopted daughter and supportive    Quit smoking in   Rare ETOH    No FH of cancer. Past Medical History:   Diagnosis Date    Asthma     SEASONAL ALLERGY INDUCED    GERD (gastroesophageal reflux disease)     Rectal cancer ([facility] [address] 2018    Diagnosed via colonoscopy on 2018. Staged as [id number]. Treated with neoadjuvant chemoradiation using Xeloda and 5040 cGy radiation and with the final radiation dose administered on 2018.  Thyroid disease     hyperthyroidism      Past Surgical History:   Procedure Laterality Date    HX COLONOSCOPY  2018    Dr. Jeacnarlos Dong.  HX GI  2005    Laparoscopic cholecystectomy.  HX HEENT      Sinus surgery.  HX ORTHOPAEDIC Right 1998    Right carpal tunnel release.  HX ORTHOPAEDIC      Right ulnar nerve repositioning.  IR INSERT TUNL CVC W PORT OVER 5 YEARS  2019      Social History     Tobacco Use    Smoking status: Former Smoker     Packs/day: 0.50     Years: 10.00     Pack years: 5.00     Last attempt to quit:      Years since quittin.2    Smokeless tobacco: Never Used   Substance Use Topics    Alcohol use: No      Family History   Problem Relation Age of Onset    Heart Disease Mother     Cancer Mother         CERVICAL    Hypertension Mother     Heart Disease Father     Hypertension Father      Current Outpatient Medications   Medication Sig    ondansetron (ZOFRAN ODT) 4 mg disintegrating tablet Take 1 Tab by mouth every four to six (4-6) hours as needed for Nausea.  OLANZapine (ZYPREXA) 10 mg tablet Take 10mg at bedtime as needed for nausea    pantoprazole (PROTONIX) 20 mg tablet Take 1 Tab by mouth daily.  prochlorperazine (COMPAZINE) 5 mg tablet Take 1 Tab by mouth every six (6) hours as needed for Nausea.  lidocaine-prilocaine (EMLA) topical cream Apply  to affected area as needed for Pain.     methIMAzole (TAPAZOLE) 5 mg tablet Take 5 mg by mouth nightly.  loratadine (CLARITIN) 10 mg tablet Take 10 mg by mouth daily as needed.  capecitabine (XELODA) 150 mg tablet Take 1 tab (150mg) twice daily on days 1-14 followed by 7 days off in a 21 day cycle    capecitabine (XELODA) 500 mg tablet Take 3 tabs (1,500mg) twice daily on days 1-14 followed by 7 days off in a 21 day cycle    dexamethasone (DECADRON) 4 mg tablet Take 8 mg by mouth See Admin Instructions. Take 2 tabs daily On day 2,3  after chemotherapy.  oxyCODONE IR (ROXICODONE) 5 mg immediate release tablet Take 1 Tab by mouth every four (4) hours as needed. Max Daily Amount: 30 mg.    benzonatate (TESSALON) 100 mg capsule Take 1 Cap by mouth three (3) times daily as needed for Cough.  terconazole (TERAZOL 7) 0.4 % vaginal cream INSERT 1 APPLICATORFUL IN VAGINA EVERY EVENING FOR 7 DAYS IF NEEDED FOR YEAST INFECTION     No current facility-administered medications for this visit. Facility-Administered Medications Ordered in Other Visits   Medication Dose Route Frequency    saline peripheral flush soln 10 mL  10 mL InterCATHeter PRN    sodium chloride 0.9% injection 10 mL  10 mL IntraVENous PRN    heparin (porcine) pf 300-500 Units  300-500 Units InterCATHeter PRN      No Known Allergies     Review of Systems: A complete review of systems was obtained, negative except as described above.     Physical Exam:     Visit Vitals  /85 (BP 1 Location: Left arm, BP Patient Position: Sitting)   Pulse 92   Temp 97.8 °F (36.6 °C) (Oral)   Resp 16   Ht 5' 6\" (1.676 m)   Wt 196 lb (88.9 kg)   SpO2 98%   BMI 31.64 kg/m²     ECOG PS: 0  General: No distress  Eyes: PERRL, anicteric sclerae  HENT: Atraumatic, OP clear  Neck: Supple  Lymphatic: No cervical, supraclavicular adenopathy  Respiratory: CTAB, normal respiratory effort  CV: Normal rate, regular rhythm, no murmurs, no peripheral edema  GI: Soft, nontender, nondistended, no masses, no hepatomegaly, no splenomegaly, Stoma noted  MS: Normal gait and station. Digits without clubbing or cyanosis. Skin: No rashes, ecchymoses, or petechiae. Normal temperature, turgor, and texture; PAC site looks good  Psych: Alert, oriented, appropriate affect, normal judgment/insight    Results:     Lab Results   Component Value Date/Time    WBC 4.0 03/01/2019 11:06 AM    HGB 12.2 03/01/2019 11:06 AM    HCT 39.0 03/01/2019 11:06 AM    PLATELET 103 14/20/6157 11:06 AM    MCV 84.6 03/01/2019 11:06 AM    ABS. NEUTROPHILS 2.6 03/01/2019 11:06 AM     Lab Results   Component Value Date/Time    Sodium 138 03/01/2019 11:06 AM    Potassium 4.0 03/01/2019 11:06 AM    Chloride 104 03/01/2019 11:06 AM    CO2 23 03/01/2019 11:06 AM    Glucose 121 (H) 03/01/2019 11:06 AM    BUN 9 03/01/2019 11:06 AM    Creatinine 0.88 03/01/2019 11:06 AM    GFR est AA >60 03/01/2019 11:06 AM    GFR est non-AA >60 03/01/2019 11:06 AM    Calcium 8.9 03/01/2019 11:06 AM     Lab Results   Component Value Date/Time    Bilirubin, total 0.3 03/01/2019 11:06 AM    ALT (SGPT) 39 03/01/2019 11:06 AM    AST (SGOT) 26 03/01/2019 11:06 AM    Alk. phosphatase 123 (H) 03/01/2019 11:06 AM    Protein, total 7.2 03/01/2019 11:06 AM    Albumin 3.3 (L) 03/01/2019 11:06 AM    Globulin 3.9 03/01/2019 11:06 AM     Records reviewed and summarized above. Pathology report(s) reviewed above. Radiology report(s) reviewed above. 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 She poorly tolerated cycle 1 with grade 2 nausea and vomiting and grade 1 neuropathy. Also CADD pump affected her QOL and she wishes to pursue a chemo treatment that does not involve a CADD. Decided to transition to CAPOX for 4 total cycles.        She developed a grade 3 hypersensitivity reaction to Oxaliplatin  Symptoms lasted 2 weeks    She has declined further chemotherapy including single agent Xeloda    We discussed pros and cons. We talked about surveillance based on NCCN guidelines. 2) Hypothyroidism  Controlled     3) Anemia  Post op. 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 Sue Noel  · RTC 3 months with cbc, cmp, cea and CT     I appreciate the opportunity to participate in Ms. Aguilar Galeano's care.     Signed By: Kian Almazan MD

## 2019-03-22 NOTE — PROGRESS NOTES
Outpatient Infusion Center Progress Note    1100 Pt admit to [facility] for Capox ambulatory in stable condition. Assessment completed, pt reports having extreme neuropathy for 5 days after last treatment, unable to hold a glass and unsteady gait, pt to discuss with MD today at appt, port accessed per protocol, good blood return, labs drawn and sent for processing. 1125 pt upstairs for MD appt    1210 pt back to [facility], per MD we are stopping tx completely    Visit Vitals  /85   Pulse 90   Temp 96.9 °F (36.1 °C)   Resp 18   Ht 5' 6\" (1.676 m)   Wt 89.4 kg (197 lb 3.2 oz)   BMI 31.83 kg/m²       1215  Port flushed, heparinized and de-accessed per protocol, D/c home ambulatory in no distress. Pt does not need any future appts as port is being removed.

## 2019-04-01 ENCOUNTER — HOSPITAL ENCOUNTER (OUTPATIENT)
Dept: INTERVENTIONAL RADIOLOGY/VASCULAR | Age: 59
Discharge: HOME | End: 2019-04-01
Attending: STUDENT IN AN ORGANIZED HEALTH CARE EDUCATION/TRAINING PROGRAM | Admitting: STUDENT IN AN ORGANIZED HEALTH CARE EDUCATION/TRAINING PROGRAM
Payer: COMMERCIAL

## 2019-04-01 VITALS
BODY MASS INDEX: 29.66 KG/M2 | WEIGHT: 189 LBS | HEIGHT: 67 IN | OXYGEN SATURATION: 100 % | TEMPERATURE: 98 F | HEART RATE: 82 BPM | SYSTOLIC BLOOD PRESSURE: 125 MMHG | RESPIRATION RATE: 18 BRPM | DIASTOLIC BLOOD PRESSURE: 58 MMHG

## 2019-04-01 DIAGNOSIS — C20 RECTAL CANCER METASTASIZED TO INTRAPELVIC LYMPH NODE (HCC): Chronic | ICD-10-CM

## 2019-04-01 DIAGNOSIS — C77.5 RECTAL CANCER METASTASIZED TO INTRAPELVIC LYMPH NODE (HCC): Chronic | ICD-10-CM

## 2019-04-01 PROCEDURE — 74011250636 HC RX REV CODE- 250/636: Performed by: STUDENT IN AN ORGANIZED HEALTH CARE EDUCATION/TRAINING PROGRAM

## 2019-04-01 PROCEDURE — 77030031139 HC SUT VCRL2 J&J -A

## 2019-04-01 PROCEDURE — 36590 REMOVAL TUNNELED CV CATH: CPT

## 2019-04-01 PROCEDURE — 99152 MOD SED SAME PHYS/QHP 5/>YRS: CPT

## 2019-04-01 PROCEDURE — 77030039266 HC ADH SKN EXOFIN S2SG -A

## 2019-04-01 PROCEDURE — 74011000250 HC RX REV CODE- 250: Performed by: STUDENT IN AN ORGANIZED HEALTH CARE EDUCATION/TRAINING PROGRAM

## 2019-04-01 PROCEDURE — 77030011893 HC TY CUT DN TRIS -B

## 2019-04-01 RX ORDER — SODIUM CHLORIDE 9 MG/ML
25 INJECTION, SOLUTION INTRAVENOUS CONTINUOUS
Status: DISCONTINUED | OUTPATIENT
Start: 2019-04-01 | End: 2019-04-01

## 2019-04-01 RX ORDER — LIDOCAINE HYDROCHLORIDE AND EPINEPHRINE 10; 10 MG/ML; UG/ML
20 INJECTION, SOLUTION INFILTRATION; PERINEURAL ONCE
Status: COMPLETED | OUTPATIENT
Start: 2019-04-01 | End: 2019-04-01

## 2019-04-01 RX ORDER — CEFAZOLIN SODIUM/WATER 2 G/20 ML
2 SYRINGE (ML) INTRAVENOUS ONCE
Status: DISCONTINUED | OUTPATIENT
Start: 2019-04-01 | End: 2019-04-01 | Stop reason: SDUPTHER

## 2019-04-01 RX ORDER — LIDOCAINE HYDROCHLORIDE 10 MG/ML
30 INJECTION, SOLUTION EPIDURAL; INFILTRATION; INTRACAUDAL; PERINEURAL ONCE
Status: DISCONTINUED | OUTPATIENT
Start: 2019-04-01 | End: 2019-04-01

## 2019-04-01 RX ORDER — MIDAZOLAM HYDROCHLORIDE 1 MG/ML
5 INJECTION, SOLUTION INTRAMUSCULAR; INTRAVENOUS
Status: DISCONTINUED | OUTPATIENT
Start: 2019-04-01 | End: 2019-04-01

## 2019-04-01 RX ORDER — CEFAZOLIN SODIUM/WATER 2 G/20 ML
2 SYRINGE (ML) INTRAVENOUS ONCE
Status: COMPLETED | OUTPATIENT
Start: 2019-04-01 | End: 2019-04-01

## 2019-04-01 RX ORDER — ONDANSETRON 2 MG/ML
4 INJECTION INTRAMUSCULAR; INTRAVENOUS AS NEEDED
Status: DISCONTINUED | OUTPATIENT
Start: 2019-04-01 | End: 2019-04-01

## 2019-04-01 RX ORDER — FENTANYL CITRATE 50 UG/ML
200 INJECTION, SOLUTION INTRAMUSCULAR; INTRAVENOUS
Status: DISCONTINUED | OUTPATIENT
Start: 2019-04-01 | End: 2019-04-01

## 2019-04-01 RX ADMIN — FENTANYL CITRATE 50 MCG: 50 INJECTION, SOLUTION INTRAMUSCULAR; INTRAVENOUS at 10:18

## 2019-04-01 RX ADMIN — Medication 2 G: at 10:18

## 2019-04-01 RX ADMIN — MIDAZOLAM HYDROCHLORIDE 2 MG: 1 INJECTION, SOLUTION INTRAMUSCULAR; INTRAVENOUS at 10:18

## 2019-04-01 RX ADMIN — MIDAZOLAM HYDROCHLORIDE 1 MG: 1 INJECTION, SOLUTION INTRAMUSCULAR; INTRAVENOUS at 10:28

## 2019-04-01 RX ADMIN — FENTANYL CITRATE 25 MCG: 50 INJECTION, SOLUTION INTRAMUSCULAR; INTRAVENOUS at 10:28

## 2019-04-01 RX ADMIN — SODIUM CHLORIDE 25 ML/HR: 900 INJECTION, SOLUTION INTRAVENOUS at 10:19

## 2019-04-01 RX ADMIN — LIDOCAINE HYDROCHLORIDE,EPINEPHRINE BITARTRATE 10 ML: 10; .01 INJECTION, SOLUTION INFILTRATION; PERINEURAL at 10:28

## 2019-04-01 RX ADMIN — ONDANSETRON 4 MG: 2 INJECTION INTRAMUSCULAR; INTRAVENOUS at 10:18

## 2019-04-01 NOTE — DISCHARGE INSTRUCTIONS
PORTACATH REMOVAL DISCHARGE INSTRUCTIONS    Home Care Instructions: Your Portacath has been removed. Do not allow bra straps or any clothing to rub the skin over the removal site. Do not bathe or swim until the skin has healed. Once it is healed (usually within 7-10 days), it is okay to bathe and swim. Restrict yourself to light activity for the first 5 days, after that resume normal activity slowly. You may resume your normal diet and medications. Follow-Up Instructions: Please see your oncologist, or the physician who ordered the port removal. Keep the area clean and dry and observe for infection. Call If: You should call your Physician and/or the Radiology Nurse if you notice redness, pus, swelling, or pain from the area of your incision. Call if you should develop a fever. To Reach Us:   Side effects of sedation medications and other medications used today have been reviewed. Notify us of nausea, itching, hives, dizziness, or anything else out of the ordinary. Should you experience any of these significant changes, please call [phone] between the hours of 7:30 am and 10 pm or [phone] after hours.  After hours, ask the  to page the [address] Technologist, and describe the problem to the technologist.

## 2019-04-01 NOTE — PROGRESS NOTES
0930: pt to angio holding for port removal  1027: procedure start  1032: procedure complete. Pt tolerated well. 1115: I have reviewed discharge instructions with the patient. The patient verbalized understanding.

## 2019-04-01 NOTE — H&P
Radiology History and Physical    Patient: Jose Nava 63 y.o. female       Chief Complaint: No chief complaint on file. History of Present Illness: Port removal.    History:    Past Medical History:   Diagnosis Date    Asthma     SEASONAL ALLERGY INDUCED    GERD (gastroesophageal reflux disease)     Rectal cancer ([facility] [address] 2018    Diagnosed via colonoscopy on 2018. Staged as [id number]. Treated with neoadjuvant chemoradiation using Xeloda and 5040 cGy radiation and with the final radiation dose administered on 2018.     Thyroid disease 2013    hyperthyroidism     Family History   Problem Relation Age of Onset    Heart Disease Mother     Cancer Mother         CERVICAL    Hypertension Mother     Heart Disease Father     Hypertension Father      Social History     Socioeconomic History    Marital status:      Spouse name: Not on file    Number of children: Not on file    Years of education: Not on file    Highest education level: Not on file   Occupational History    Not on file   Social Needs    Financial resource strain: Not on file    Food insecurity:     Worry: Not on file     Inability: Not on file    Transportation needs:     Medical: Not on file     Non-medical: Not on file   Tobacco Use    Smoking status: Former Smoker     Packs/day: 0.50     Years: 10.00     Pack years: 5.00     Last attempt to quit:      Years since quittin.2    Smokeless tobacco: Never Used   Substance and Sexual Activity    Alcohol use: No    Drug use: No    Sexual activity: Not on file   Lifestyle    Physical activity:     Days per week: Not on file     Minutes per session: Not on file    Stress: Not on file   Relationships    Social connections:     Talks on phone: Not on file     Gets together: Not on file     Attends Denominational service: Not on file     Active member of club or organization: Not on file     Attends meetings of clubs or organizations: Not on file     Relationship status: Not on file    Intimate partner violence:     Fear of current or ex partner: Not on file     Emotionally abused: Not on file     Physically abused: Not on file     Forced sexual activity: Not on file   Other Topics Concern    Not on file   Social History Narrative    Not on file       Allergies: No Known Allergies    Current Medications:  Current Facility-Administered Medications   Medication Dose Route Frequency    0.9% sodium chloride infusion  25 mL/hr IntraVENous CONTINUOUS    fentaNYL citrate (PF) injection 200 mcg  200 mcg IntraVENous Rad Multiple    midazolam (VERSED) injection 5 mg  5 mg IntraVENous Rad Multiple    lidocaine (PF) (XYLOCAINE) 10 mg/mL (1 %) injection 30 mL  30 mL SubCUTAneous ONCE    ondansetron (ZOFRAN) injection 4 mg  4 mg IntraVENous PRN        Physical Exam:  Blood pressure 127/84, pulse 81, temperature 98 °F (36.7 °C), resp. rate 14, height 5' 6.5\" (1.689 m), weight 85.7 kg (189 lb), SpO2 100 %, not currently breastfeeding. GENERAL: alert, cooperative, no distress, appears stated age  LUNG: clear to auscultation bilaterally  HEART: regular rate and rhythm  ABD: Non tender, non distended. Alerts:    Hospital Problems  Date Reviewed: 3/22/2019    None          Laboratory:    No results for input(s): HGB, HCT, WBC, PLT, INR, BUN, CREA, K, CRCLT, HGBEXT, HCTEXT, PLTEXT in the last 72 hours. No lab exists for component: PTT, PT, INREXT      Plan of Care/Planned Procedure:  Risks, benefits, and alternatives reviewed with patient and she agrees to proceed with the procedure.        Lisa Gandhi MD

## 2019-04-12 ENCOUNTER — APPOINTMENT (OUTPATIENT)
Dept: INFUSION THERAPY | Age: 59
End: 2019-04-12

## 2019-04-15 ENCOUNTER — HOSPITAL ENCOUNTER (OUTPATIENT)
Dept: PREADMISSION TESTING | Age: 59
Discharge: HOME | End: 2019-04-15
Payer: COMMERCIAL

## 2019-04-15 ENCOUNTER — HOSPITAL ENCOUNTER (OUTPATIENT)
Dept: GENERAL RADIOLOGY | Age: 59
Discharge: HOME | End: 2019-04-15
Attending: COLON & RECTAL SURGERY
Payer: COMMERCIAL

## 2019-04-15 VITALS
BODY MASS INDEX: 30.29 KG/M2 | TEMPERATURE: 97.9 F | SYSTOLIC BLOOD PRESSURE: 122 MMHG | WEIGHT: 193 LBS | DIASTOLIC BLOOD PRESSURE: 80 MMHG | HEIGHT: 67 IN | HEART RATE: 82 BPM

## 2019-04-15 LAB
ALBUMIN SERPL-MCNC: 3.3 G/DL (ref 3.5–5)
ALBUMIN/GLOB SERPL: 0.9 {RATIO} (ref 1.1–2.2)
ALP SERPL-CCNC: 117 U/L (ref 45–117)
ALT SERPL-CCNC: 34 U/L (ref 12–78)
ANION GAP SERPL CALC-SCNC: 8 MMOL/L (ref 5–15)
AST SERPL-CCNC: 27 U/L (ref 15–37)
BILIRUB SERPL-MCNC: 0.4 MG/DL (ref 0.2–1)
BUN SERPL-MCNC: 12 MG/DL (ref 6–20)
BUN/CREAT SERPL: 16 (ref 12–20)
CALCIUM SERPL-MCNC: 9.1 MG/DL (ref 8.5–10.1)
CHLORIDE SERPL-SCNC: 105 MMOL/L (ref 97–108)
CO2 SERPL-SCNC: 23 MMOL/L (ref 21–32)
CREAT SERPL-MCNC: 0.74 MG/DL (ref 0.55–1.02)
ERYTHROCYTE [DISTWIDTH] IN BLOOD BY AUTOMATED COUNT: 17.4 % (ref 11.5–14.5)
GLOBULIN SER CALC-MCNC: 3.6 G/DL (ref 2–4)
GLUCOSE SERPL-MCNC: 91 MG/DL (ref 65–100)
HCT VFR BLD AUTO: 36.4 % (ref 35–47)
HGB BLD-MCNC: 11.5 G/DL (ref 11.5–16)
MCH RBC QN AUTO: 27.1 PG (ref 26–34)
MCHC RBC AUTO-ENTMCNC: 31.6 G/DL (ref 30–36.5)
MCV RBC AUTO: 85.8 FL (ref 80–99)
NRBC # BLD: 0 K/UL (ref 0–0.01)
NRBC BLD-RTO: 0 PER 100 WBC
PLATELET # BLD AUTO: 217 K/UL (ref 150–400)
PMV BLD AUTO: 10.5 FL (ref 8.9–12.9)
POTASSIUM SERPL-SCNC: 4.3 MMOL/L (ref 3.5–5.1)
PROT SERPL-MCNC: 6.9 G/DL (ref 6.4–8.2)
RBC # BLD AUTO: 4.24 M/UL (ref 3.8–5.2)
SODIUM SERPL-SCNC: 136 MMOL/L (ref 136–145)
WBC # BLD AUTO: 4.6 K/UL (ref 3.6–11)

## 2019-04-15 PROCEDURE — 71046 X-RAY EXAM CHEST 2 VIEWS: CPT

## 2019-04-15 PROCEDURE — 80053 COMPREHEN METABOLIC PANEL: CPT

## 2019-04-15 PROCEDURE — 85027 COMPLETE CBC AUTOMATED: CPT

## 2019-04-15 NOTE — PERIOP NOTES
PREOPERATIVE INSTRUCTIONS REVIEWED WITH PATIENT. PATIENT GIVEN TWO-- SIX PACKS OF CHG WIPES. INSTRUCTIONS REVIEWED ON USE OF CHG WIPES. PATIENT GIVEN SSI INFECTIONS SHEET. PATIENT WAS GIVEN THE OPPORTUNITY TO ASK QUESTIONS ON THE INFORMATION PROVIDED. PATIENT GIVEN INSTRUCTIONS/DIRECTIONS FOR CXR TO BE DONE AT [address] [place] [address]; ORDER ENTERED. PATIENT VERBALIZES UNDERSTANDING OF PREOP PREP:  BOWEL PREP PER DR. GRAY'S INSTRUCTIONS.

## 2019-04-30 ENCOUNTER — ANESTHESIA EVENT (OUTPATIENT)
Dept: SURGERY | Age: 59
DRG: 331 | End: 2019-04-30
Payer: COMMERCIAL

## 2019-04-30 ENCOUNTER — HOSPITAL ENCOUNTER (INPATIENT)
Age: 59
LOS: 3 days | Discharge: HOME | DRG: 331 | End: 2019-05-03
Attending: COLON & RECTAL SURGERY | Admitting: COLON & RECTAL SURGERY
Payer: COMMERCIAL

## 2019-04-30 ENCOUNTER — ANESTHESIA (OUTPATIENT)
Dept: SURGERY | Age: 59
DRG: 331 | End: 2019-04-30
Payer: COMMERCIAL

## 2019-04-30 PROBLEM — Z93.2 ILEOSTOMY PRESENT (HCC): Chronic | Status: ACTIVE | Noted: 2019-04-30

## 2019-04-30 PROBLEM — Z93.2 ILEOSTOMY IN PLACE (HCC): Status: ACTIVE | Noted: 2019-04-30

## 2019-04-30 LAB
ANION GAP SERPL CALC-SCNC: 6 MMOL/L (ref 5–15)
BASOPHILS # BLD: 0 K/UL (ref 0–0.1)
BASOPHILS NFR BLD: 0 % (ref 0–1)
BUN SERPL-MCNC: 10 MG/DL (ref 6–20)
BUN/CREAT SERPL: 9 (ref 12–20)
CALCIUM SERPL-MCNC: 8.9 MG/DL (ref 8.5–10.1)
CHLORIDE SERPL-SCNC: 106 MMOL/L (ref 97–108)
CO2 SERPL-SCNC: 26 MMOL/L (ref 21–32)
CREAT SERPL-MCNC: 1.09 MG/DL (ref 0.55–1.02)
DIFFERENTIAL METHOD BLD: ABNORMAL
EOSINOPHIL # BLD: 0.1 K/UL (ref 0–0.4)
EOSINOPHIL NFR BLD: 1 % (ref 0–7)
ERYTHROCYTE [DISTWIDTH] IN BLOOD BY AUTOMATED COUNT: 15.9 % (ref 11.5–14.5)
GLUCOSE SERPL-MCNC: 137 MG/DL (ref 65–100)
HCT VFR BLD AUTO: 38.2 % (ref 35–47)
HGB BLD-MCNC: 11.8 G/DL (ref 11.5–16)
IMM GRANULOCYTES # BLD AUTO: 0 K/UL (ref 0–0.04)
IMM GRANULOCYTES NFR BLD AUTO: 0 % (ref 0–0.5)
LYMPHOCYTES # BLD: 0.6 K/UL (ref 0.8–3.5)
LYMPHOCYTES NFR BLD: 11 % (ref 12–49)
MAGNESIUM SERPL-MCNC: 2 MG/DL (ref 1.6–2.4)
MCH RBC QN AUTO: 26.9 PG (ref 26–34)
MCHC RBC AUTO-ENTMCNC: 30.9 G/DL (ref 30–36.5)
MCV RBC AUTO: 87.2 FL (ref 80–99)
MONOCYTES # BLD: 0.1 K/UL (ref 0–1)
MONOCYTES NFR BLD: 2 % (ref 5–13)
NEUTS SEG # BLD: 4.5 K/UL (ref 1.8–8)
NEUTS SEG NFR BLD: 86 % (ref 32–75)
NRBC # BLD: 0 K/UL (ref 0–0.01)
NRBC BLD-RTO: 0 PER 100 WBC
PHOSPHATE SERPL-MCNC: 4.5 MG/DL (ref 2.6–4.7)
PLATELET # BLD AUTO: 194 K/UL (ref 150–400)
PMV BLD AUTO: 10.3 FL (ref 8.9–12.9)
POTASSIUM SERPL-SCNC: 4.4 MMOL/L (ref 3.5–5.1)
RBC # BLD AUTO: 4.38 M/UL (ref 3.8–5.2)
RBC MORPH BLD: ABNORMAL
SODIUM SERPL-SCNC: 138 MMOL/L (ref 136–145)
WBC # BLD AUTO: 5.3 K/UL (ref 3.6–11)

## 2019-04-30 PROCEDURE — 80048 BASIC METABOLIC PNL TOTAL CA: CPT

## 2019-04-30 PROCEDURE — 77030018836 HC SOL IRR NACL ICUM -A: Performed by: COLON & RECTAL SURGERY

## 2019-04-30 PROCEDURE — 74011250637 HC RX REV CODE- 250/637: Performed by: COLON & RECTAL SURGERY

## 2019-04-30 PROCEDURE — 74011000250 HC RX REV CODE- 250

## 2019-04-30 PROCEDURE — 77030018536 HC STPLR ENDO TA DISP COVD -C: Performed by: COLON & RECTAL SURGERY

## 2019-04-30 PROCEDURE — 77030008467 HC STPLR SKN COVD -B: Performed by: COLON & RECTAL SURGERY

## 2019-04-30 PROCEDURE — 77030036731 HC STPLR ENDOSC J&J -F: Performed by: COLON & RECTAL SURGERY

## 2019-04-30 PROCEDURE — 88304 TISSUE EXAM BY PATHOLOGIST: CPT

## 2019-04-30 PROCEDURE — 77030002996 HC SUT SLK J&J -A: Performed by: COLON & RECTAL SURGERY

## 2019-04-30 PROCEDURE — C1765 ADHESION BARRIER: HCPCS | Performed by: COLON & RECTAL SURGERY

## 2019-04-30 PROCEDURE — 76210000006 HC OR PH I REC 0.5 TO 1 HR: Performed by: COLON & RECTAL SURGERY

## 2019-04-30 PROCEDURE — 77030026438 HC STYL ET INTUB CARD -A: Performed by: ANESTHESIOLOGY

## 2019-04-30 PROCEDURE — 77030018846 HC SOL IRR STRL H20 ICUM -A: Performed by: COLON & RECTAL SURGERY

## 2019-04-30 PROCEDURE — 77030011640 HC PAD GRND REM COVD -A: Performed by: COLON & RECTAL SURGERY

## 2019-04-30 PROCEDURE — 77030034850: Performed by: COLON & RECTAL SURGERY

## 2019-04-30 PROCEDURE — 85025 COMPLETE CBC W/AUTO DIFF WBC: CPT

## 2019-04-30 PROCEDURE — 74011250636 HC RX REV CODE- 250/636

## 2019-04-30 PROCEDURE — 77030032490 HC SLV COMPR SCD KNE COVD -B: Performed by: COLON & RECTAL SURGERY

## 2019-04-30 PROCEDURE — 76010000153 HC OR TIME 1.5 TO 2 HR: Performed by: COLON & RECTAL SURGERY

## 2019-04-30 PROCEDURE — 77030031139 HC SUT VCRL2 J&J -A: Performed by: COLON & RECTAL SURGERY

## 2019-04-30 PROCEDURE — 83735 ASSAY OF MAGNESIUM: CPT

## 2019-04-30 PROCEDURE — 74011000250 HC RX REV CODE- 250: Performed by: COLON & RECTAL SURGERY

## 2019-04-30 PROCEDURE — 77030008684 HC TU ET CUF COVD -B: Performed by: ANESTHESIOLOGY

## 2019-04-30 PROCEDURE — 74011250636 HC RX REV CODE- 250/636: Performed by: COLON & RECTAL SURGERY

## 2019-04-30 PROCEDURE — 77030020782 HC GWN BAIR PAWS FLX 3M -B

## 2019-04-30 PROCEDURE — 74011250637 HC RX REV CODE- 250/637

## 2019-04-30 PROCEDURE — 84100 ASSAY OF PHOSPHORUS: CPT

## 2019-04-30 PROCEDURE — 65270000032 HC RM SEMIPRIVATE

## 2019-04-30 PROCEDURE — 74011000258 HC RX REV CODE- 258: Performed by: COLON & RECTAL SURGERY

## 2019-04-30 PROCEDURE — 77030019702 HC WRP THER MENM -C: Performed by: COLON & RECTAL SURGERY

## 2019-04-30 PROCEDURE — 36415 COLL VENOUS BLD VENIPUNCTURE: CPT

## 2019-04-30 PROCEDURE — 74011250636 HC RX REV CODE- 250/636: Performed by: ANESTHESIOLOGY

## 2019-04-30 PROCEDURE — 76060000035 HC ANESTHESIA 2 TO 2.5 HR: Performed by: COLON & RECTAL SURGERY

## 2019-04-30 PROCEDURE — 0DBB0ZZ EXCISION OF ILEUM, OPEN APPROACH: ICD-10-PCS | Performed by: COLON & RECTAL SURGERY

## 2019-04-30 RX ORDER — LORATADINE 10 MG/1
10 TABLET ORAL
Status: DISCONTINUED | OUTPATIENT
Start: 2019-04-30 | End: 2019-05-03 | Stop reason: HOSPADM

## 2019-04-30 RX ORDER — HYDROMORPHONE HYDROCHLORIDE 1 MG/ML
0.2 INJECTION, SOLUTION INTRAMUSCULAR; INTRAVENOUS; SUBCUTANEOUS
Status: DISCONTINUED | OUTPATIENT
Start: 2019-04-30 | End: 2019-04-30 | Stop reason: HOSPADM

## 2019-04-30 RX ORDER — SODIUM CHLORIDE 9 MG/ML
50 INJECTION, SOLUTION INTRAVENOUS CONTINUOUS
Status: DISCONTINUED | OUTPATIENT
Start: 2019-04-30 | End: 2019-04-30 | Stop reason: HOSPADM

## 2019-04-30 RX ORDER — SODIUM CHLORIDE 0.9 % (FLUSH) 0.9 %
5-40 SYRINGE (ML) INJECTION AS NEEDED
Status: DISCONTINUED | OUTPATIENT
Start: 2019-04-30 | End: 2019-04-30 | Stop reason: HOSPADM

## 2019-04-30 RX ORDER — ROCURONIUM BROMIDE 10 MG/ML
INJECTION, SOLUTION INTRAVENOUS AS NEEDED
Status: DISCONTINUED | OUTPATIENT
Start: 2019-04-30 | End: 2019-04-30 | Stop reason: HOSPADM

## 2019-04-30 RX ORDER — SODIUM CHLORIDE 0.9 % (FLUSH) 0.9 %
5-40 SYRINGE (ML) INJECTION EVERY 8 HOURS
Status: DISCONTINUED | OUTPATIENT
Start: 2019-04-30 | End: 2019-04-30 | Stop reason: HOSPADM

## 2019-04-30 RX ORDER — ACETAMINOPHEN 500 MG
1000 TABLET ORAL EVERY 6 HOURS
Status: DISCONTINUED | OUTPATIENT
Start: 2019-04-30 | End: 2019-05-03 | Stop reason: HOSPADM

## 2019-04-30 RX ORDER — FENTANYL CITRATE 50 UG/ML
INJECTION, SOLUTION INTRAMUSCULAR; INTRAVENOUS AS NEEDED
Status: DISCONTINUED | OUTPATIENT
Start: 2019-04-30 | End: 2019-04-30 | Stop reason: HOSPADM

## 2019-04-30 RX ORDER — PROPOFOL 10 MG/ML
INJECTION, EMULSION INTRAVENOUS AS NEEDED
Status: DISCONTINUED | OUTPATIENT
Start: 2019-04-30 | End: 2019-04-30 | Stop reason: HOSPADM

## 2019-04-30 RX ORDER — FENTANYL CITRATE 50 UG/ML
50 INJECTION, SOLUTION INTRAMUSCULAR; INTRAVENOUS AS NEEDED
Status: DISCONTINUED | OUTPATIENT
Start: 2019-04-30 | End: 2019-04-30 | Stop reason: HOSPADM

## 2019-04-30 RX ORDER — PANTOPRAZOLE SODIUM 20 MG/1
20 TABLET, DELAYED RELEASE ORAL DAILY
Status: DISCONTINUED | OUTPATIENT
Start: 2019-05-01 | End: 2019-05-03 | Stop reason: HOSPADM

## 2019-04-30 RX ORDER — DEXTROSE, SODIUM CHLORIDE, SODIUM LACTATE, POTASSIUM CHLORIDE, AND CALCIUM CHLORIDE 5; .6; .31; .03; .02 G/100ML; G/100ML; G/100ML; G/100ML; G/100ML
100 INJECTION, SOLUTION INTRAVENOUS CONTINUOUS
Status: DISCONTINUED | OUTPATIENT
Start: 2019-04-30 | End: 2019-05-01

## 2019-04-30 RX ORDER — MIDAZOLAM HYDROCHLORIDE 1 MG/ML
INJECTION, SOLUTION INTRAMUSCULAR; INTRAVENOUS AS NEEDED
Status: DISCONTINUED | OUTPATIENT
Start: 2019-04-30 | End: 2019-04-30 | Stop reason: HOSPADM

## 2019-04-30 RX ORDER — SODIUM CHLORIDE, SODIUM LACTATE, POTASSIUM CHLORIDE, CALCIUM CHLORIDE 600; 310; 30; 20 MG/100ML; MG/100ML; MG/100ML; MG/100ML
75 INJECTION, SOLUTION INTRAVENOUS CONTINUOUS
Status: DISCONTINUED | OUTPATIENT
Start: 2019-04-30 | End: 2019-04-30 | Stop reason: HOSPADM

## 2019-04-30 RX ORDER — DIPHENHYDRAMINE HYDROCHLORIDE 50 MG/ML
12.5 INJECTION, SOLUTION INTRAMUSCULAR; INTRAVENOUS AS NEEDED
Status: DISCONTINUED | OUTPATIENT
Start: 2019-04-30 | End: 2019-04-30 | Stop reason: HOSPADM

## 2019-04-30 RX ORDER — ENOXAPARIN SODIUM 100 MG/ML
40 INJECTION SUBCUTANEOUS EVERY 24 HOURS
Status: DISCONTINUED | OUTPATIENT
Start: 2019-05-01 | End: 2019-05-03 | Stop reason: HOSPADM

## 2019-04-30 RX ORDER — MIDAZOLAM HYDROCHLORIDE 1 MG/ML
1 INJECTION, SOLUTION INTRAMUSCULAR; INTRAVENOUS AS NEEDED
Status: DISCONTINUED | OUTPATIENT
Start: 2019-04-30 | End: 2019-04-30 | Stop reason: HOSPADM

## 2019-04-30 RX ORDER — ONDANSETRON 2 MG/ML
4 INJECTION INTRAMUSCULAR; INTRAVENOUS AS NEEDED
Status: DISCONTINUED | OUTPATIENT
Start: 2019-04-30 | End: 2019-04-30 | Stop reason: HOSPADM

## 2019-04-30 RX ORDER — SUCCINYLCHOLINE CHLORIDE 20 MG/ML
INJECTION INTRAMUSCULAR; INTRAVENOUS AS NEEDED
Status: DISCONTINUED | OUTPATIENT
Start: 2019-04-30 | End: 2019-04-30 | Stop reason: HOSPADM

## 2019-04-30 RX ORDER — BUPIVACAINE HYDROCHLORIDE AND EPINEPHRINE 5; 5 MG/ML; UG/ML
30 INJECTION, SOLUTION EPIDURAL; INTRACAUDAL; PERINEURAL ONCE
Status: COMPLETED | OUTPATIENT
Start: 2019-04-30 | End: 2019-04-30

## 2019-04-30 RX ORDER — METHIMAZOLE 5 MG/1
5 TABLET ORAL
Status: DISCONTINUED | OUTPATIENT
Start: 2019-04-30 | End: 2019-05-03 | Stop reason: HOSPADM

## 2019-04-30 RX ORDER — OXYCODONE HYDROCHLORIDE 5 MG/1
5-10 TABLET ORAL
Status: DISCONTINUED | OUTPATIENT
Start: 2019-04-30 | End: 2019-05-03 | Stop reason: HOSPADM

## 2019-04-30 RX ORDER — OXYCODONE AND ACETAMINOPHEN 5; 325 MG/1; MG/1
1 TABLET ORAL AS NEEDED
Status: DISCONTINUED | OUTPATIENT
Start: 2019-04-30 | End: 2019-04-30 | Stop reason: HOSPADM

## 2019-04-30 RX ORDER — NALOXONE HYDROCHLORIDE 0.4 MG/ML
0.4 INJECTION, SOLUTION INTRAMUSCULAR; INTRAVENOUS; SUBCUTANEOUS AS NEEDED
Status: DISCONTINUED | OUTPATIENT
Start: 2019-04-30 | End: 2019-05-03 | Stop reason: HOSPADM

## 2019-04-30 RX ORDER — DEXAMETHASONE SODIUM PHOSPHATE 4 MG/ML
INJECTION, SOLUTION INTRA-ARTICULAR; INTRALESIONAL; INTRAMUSCULAR; INTRAVENOUS; SOFT TISSUE AS NEEDED
Status: DISCONTINUED | OUTPATIENT
Start: 2019-04-30 | End: 2019-04-30 | Stop reason: HOSPADM

## 2019-04-30 RX ORDER — CELECOXIB 100 MG/1
200 CAPSULE ORAL 2 TIMES DAILY
Status: DISCONTINUED | OUTPATIENT
Start: 2019-05-01 | End: 2019-05-03 | Stop reason: HOSPADM

## 2019-04-30 RX ORDER — NEOSTIGMINE METHYLSULFATE 1 MG/ML
INJECTION INTRAVENOUS AS NEEDED
Status: DISCONTINUED | OUTPATIENT
Start: 2019-04-30 | End: 2019-04-30 | Stop reason: HOSPADM

## 2019-04-30 RX ORDER — SODIUM CHLORIDE 0.9 % (FLUSH) 0.9 %
5-40 SYRINGE (ML) INJECTION EVERY 8 HOURS
Status: DISCONTINUED | OUTPATIENT
Start: 2019-04-30 | End: 2019-05-03 | Stop reason: HOSPADM

## 2019-04-30 RX ORDER — MIDAZOLAM HYDROCHLORIDE 1 MG/ML
0.5 INJECTION, SOLUTION INTRAMUSCULAR; INTRAVENOUS
Status: DISCONTINUED | OUTPATIENT
Start: 2019-04-30 | End: 2019-04-30 | Stop reason: HOSPADM

## 2019-04-30 RX ORDER — SODIUM CHLORIDE 0.9 % (FLUSH) 0.9 %
5-40 SYRINGE (ML) INJECTION AS NEEDED
Status: DISCONTINUED | OUTPATIENT
Start: 2019-04-30 | End: 2019-05-03 | Stop reason: HOSPADM

## 2019-04-30 RX ORDER — SCOLOPAMINE TRANSDERMAL SYSTEM 1 MG/1
PATCH, EXTENDED RELEASE TRANSDERMAL AS NEEDED
Status: DISCONTINUED | OUTPATIENT
Start: 2019-04-30 | End: 2019-04-30 | Stop reason: HOSPADM

## 2019-04-30 RX ORDER — ONDANSETRON 2 MG/ML
INJECTION INTRAMUSCULAR; INTRAVENOUS AS NEEDED
Status: DISCONTINUED | OUTPATIENT
Start: 2019-04-30 | End: 2019-04-30 | Stop reason: HOSPADM

## 2019-04-30 RX ORDER — FENTANYL CITRATE 50 UG/ML
25 INJECTION, SOLUTION INTRAMUSCULAR; INTRAVENOUS
Status: DISCONTINUED | OUTPATIENT
Start: 2019-04-30 | End: 2019-04-30 | Stop reason: HOSPADM

## 2019-04-30 RX ORDER — GLYCOPYRROLATE 0.2 MG/ML
INJECTION INTRAMUSCULAR; INTRAVENOUS AS NEEDED
Status: DISCONTINUED | OUTPATIENT
Start: 2019-04-30 | End: 2019-04-30 | Stop reason: HOSPADM

## 2019-04-30 RX ORDER — LIDOCAINE HYDROCHLORIDE 10 MG/ML
0.1 INJECTION, SOLUTION EPIDURAL; INFILTRATION; INTRACAUDAL; PERINEURAL AS NEEDED
Status: DISCONTINUED | OUTPATIENT
Start: 2019-04-30 | End: 2019-04-30 | Stop reason: HOSPADM

## 2019-04-30 RX ORDER — MORPHINE SULFATE 10 MG/ML
2 INJECTION, SOLUTION INTRAMUSCULAR; INTRAVENOUS
Status: DISCONTINUED | OUTPATIENT
Start: 2019-04-30 | End: 2019-04-30 | Stop reason: HOSPADM

## 2019-04-30 RX ORDER — HYDROMORPHONE HYDROCHLORIDE 2 MG/ML
INJECTION, SOLUTION INTRAMUSCULAR; INTRAVENOUS; SUBCUTANEOUS AS NEEDED
Status: DISCONTINUED | OUTPATIENT
Start: 2019-04-30 | End: 2019-04-30 | Stop reason: HOSPADM

## 2019-04-30 RX ORDER — DEXMEDETOMIDINE HYDROCHLORIDE 4 UG/ML
INJECTION, SOLUTION INTRAVENOUS AS NEEDED
Status: DISCONTINUED | OUTPATIENT
Start: 2019-04-30 | End: 2019-04-30 | Stop reason: HOSPADM

## 2019-04-30 RX ORDER — BUPIVACAINE HYDROCHLORIDE AND EPINEPHRINE 5; 5 MG/ML; UG/ML
30 INJECTION, SOLUTION EPIDURAL; INTRACAUDAL; PERINEURAL ONCE
Status: DISCONTINUED | OUTPATIENT
Start: 2019-04-30 | End: 2019-04-30 | Stop reason: HOSPADM

## 2019-04-30 RX ORDER — KETOROLAC TROMETHAMINE 30 MG/ML
15 INJECTION, SOLUTION INTRAMUSCULAR; INTRAVENOUS EVERY 6 HOURS
Status: COMPLETED | OUTPATIENT
Start: 2019-04-30 | End: 2019-05-01

## 2019-04-30 RX ORDER — SODIUM CHLORIDE, SODIUM LACTATE, POTASSIUM CHLORIDE, CALCIUM CHLORIDE 600; 310; 30; 20 MG/100ML; MG/100ML; MG/100ML; MG/100ML
INJECTION, SOLUTION INTRAVENOUS
Status: DISCONTINUED | OUTPATIENT
Start: 2019-04-30 | End: 2019-04-30 | Stop reason: HOSPADM

## 2019-04-30 RX ORDER — ONDANSETRON 4 MG/1
4 TABLET, ORALLY DISINTEGRATING ORAL
Status: DISCONTINUED | OUTPATIENT
Start: 2019-04-30 | End: 2019-05-03 | Stop reason: HOSPADM

## 2019-04-30 RX ADMIN — DEXMEDETOMIDINE HYDROCHLORIDE 4 MCG: 4 INJECTION, SOLUTION INTRAVENOUS at 14:38

## 2019-04-30 RX ADMIN — DEXMEDETOMIDINE HYDROCHLORIDE 4 MCG: 4 INJECTION, SOLUTION INTRAVENOUS at 14:54

## 2019-04-30 RX ADMIN — DEXMEDETOMIDINE HYDROCHLORIDE 4 MCG: 4 INJECTION, SOLUTION INTRAVENOUS at 14:22

## 2019-04-30 RX ADMIN — METHIMAZOLE 5 MG: 5 TABLET ORAL at 21:47

## 2019-04-30 RX ADMIN — ROCURONIUM BROMIDE 35 MG: 10 INJECTION, SOLUTION INTRAVENOUS at 13:57

## 2019-04-30 RX ADMIN — PROPOFOL 150 MG: 10 INJECTION, EMULSION INTRAVENOUS at 13:51

## 2019-04-30 RX ADMIN — GLYCOPYRROLATE 0.5 MG: 0.2 INJECTION INTRAMUSCULAR; INTRAVENOUS at 15:34

## 2019-04-30 RX ADMIN — KETOROLAC TROMETHAMINE 15 MG: 30 INJECTION, SOLUTION INTRAMUSCULAR at 19:04

## 2019-04-30 RX ADMIN — Medication 10 ML: at 21:47

## 2019-04-30 RX ADMIN — CEFOTETAN DISODIUM 2 G: 2 INJECTION, POWDER, FOR SOLUTION INTRAMUSCULAR; INTRAVENOUS at 14:05

## 2019-04-30 RX ADMIN — SODIUM CHLORIDE, SODIUM LACTATE, POTASSIUM CHLORIDE, CALCIUM CHLORIDE: 600; 310; 30; 20 INJECTION, SOLUTION INTRAVENOUS at 13:41

## 2019-04-30 RX ADMIN — FENTANYL CITRATE 50 MCG: 50 INJECTION, SOLUTION INTRAMUSCULAR; INTRAVENOUS at 14:04

## 2019-04-30 RX ADMIN — NEOSTIGMINE METHYLSULFATE 3.5 MG: 1 INJECTION INTRAVENOUS at 15:34

## 2019-04-30 RX ADMIN — DEXMEDETOMIDINE HYDROCHLORIDE 4 MCG: 4 INJECTION, SOLUTION INTRAVENOUS at 14:42

## 2019-04-30 RX ADMIN — FENTANYL CITRATE 50 MCG: 50 INJECTION, SOLUTION INTRAMUSCULAR; INTRAVENOUS at 14:58

## 2019-04-30 RX ADMIN — ROCURONIUM BROMIDE 5 MG: 10 INJECTION, SOLUTION INTRAVENOUS at 13:51

## 2019-04-30 RX ADMIN — SODIUM CHLORIDE, SODIUM LACTATE, POTASSIUM CHLORIDE, CALCIUM CHLORIDE, AND DEXTROSE MONOHYDRATE 100 ML/HR: 600; 310; 30; 20; 5 INJECTION, SOLUTION INTRAVENOUS at 16:58

## 2019-04-30 RX ADMIN — ONDANSETRON 4 MG: 4 TABLET, ORALLY DISINTEGRATING ORAL at 19:03

## 2019-04-30 RX ADMIN — DEXMEDETOMIDINE HYDROCHLORIDE 4 MCG: 4 INJECTION, SOLUTION INTRAVENOUS at 14:44

## 2019-04-30 RX ADMIN — SODIUM CHLORIDE, SODIUM LACTATE, POTASSIUM CHLORIDE, AND CALCIUM CHLORIDE 75 ML/HR: 600; 310; 30; 20 INJECTION, SOLUTION INTRAVENOUS at 12:44

## 2019-04-30 RX ADMIN — SCOLOPAMINE TRANSDERMAL SYSTEM 1 PATCH: 1 PATCH, EXTENDED RELEASE TRANSDERMAL at 13:43

## 2019-04-30 RX ADMIN — ONDANSETRON 4 MG: 2 INJECTION INTRAMUSCULAR; INTRAVENOUS at 16:45

## 2019-04-30 RX ADMIN — FENTANYL CITRATE 50 MCG: 50 INJECTION, SOLUTION INTRAMUSCULAR; INTRAVENOUS at 13:51

## 2019-04-30 RX ADMIN — DEXMEDETOMIDINE HYDROCHLORIDE 4 MCG: 4 INJECTION, SOLUTION INTRAVENOUS at 14:49

## 2019-04-30 RX ADMIN — Medication 10 ML: at 16:00

## 2019-04-30 RX ADMIN — ONDANSETRON 4 MG: 2 INJECTION INTRAMUSCULAR; INTRAVENOUS at 15:17

## 2019-04-30 RX ADMIN — SUCCINYLCHOLINE CHLORIDE 160 MG: 20 INJECTION INTRAMUSCULAR; INTRAVENOUS at 13:52

## 2019-04-30 RX ADMIN — DIPHENHYDRAMINE HYDROCHLORIDE 12.5 MG: 50 INJECTION, SOLUTION INTRAMUSCULAR; INTRAVENOUS at 17:18

## 2019-04-30 RX ADMIN — ACETAMINOPHEN 1000 MG: 500 TABLET ORAL at 19:03

## 2019-04-30 RX ADMIN — SODIUM CHLORIDE, SODIUM LACTATE, POTASSIUM CHLORIDE, CALCIUM CHLORIDE: 600; 310; 30; 20 INJECTION, SOLUTION INTRAVENOUS at 14:56

## 2019-04-30 RX ADMIN — DEXMEDETOMIDINE HYDROCHLORIDE 4 MCG: 4 INJECTION, SOLUTION INTRAVENOUS at 14:31

## 2019-04-30 RX ADMIN — ROCURONIUM BROMIDE 10 MG: 10 INJECTION, SOLUTION INTRAVENOUS at 14:46

## 2019-04-30 RX ADMIN — DEXMEDETOMIDINE HYDROCHLORIDE 4 MCG: 4 INJECTION, SOLUTION INTRAVENOUS at 14:26

## 2019-04-30 RX ADMIN — MIDAZOLAM HYDROCHLORIDE 2 MG: 1 INJECTION, SOLUTION INTRAMUSCULAR; INTRAVENOUS at 13:43

## 2019-04-30 RX ADMIN — HYDROMORPHONE HYDROCHLORIDE 0.5 MG: 2 INJECTION, SOLUTION INTRAMUSCULAR; INTRAVENOUS; SUBCUTANEOUS at 14:18

## 2019-04-30 RX ADMIN — DEXMEDETOMIDINE HYDROCHLORIDE 4 MCG: 4 INJECTION, SOLUTION INTRAVENOUS at 15:04

## 2019-04-30 RX ADMIN — DEXMEDETOMIDINE HYDROCHLORIDE 4 MCG: 4 INJECTION, SOLUTION INTRAVENOUS at 14:59

## 2019-04-30 RX ADMIN — DEXAMETHASONE SODIUM PHOSPHATE 6 MG: 4 INJECTION, SOLUTION INTRA-ARTICULAR; INTRALESIONAL; INTRAMUSCULAR; INTRAVENOUS; SOFT TISSUE at 14:04

## 2019-04-30 NOTE — ANESTHESIA POSTPROCEDURE EVALUATION
Post-Anesthesia Evaluation and Assessment Patient: Ramon Esqueda MRN: [id number]  SSN: [id number] YOB: 1960  Age: 63 y.o. Sex: female I have evaluated the patient and they are stable and ready for discharge from the PACU. Cardiovascular Function/Vital Signs Visit Vitals /69 Pulse 87 Temp 36.8 °C (98.2 °F) Resp 16 Ht 5' 6.5\" (1.689 m) Wt 87.5 kg (193 lb) SpO2 98% BMI 30.68 kg/m² Patient is status post General anesthesia for Procedure(s): CLOSURE LOOP ILEOSTOMY. Nausea/Vomiting: None Postoperative hydration reviewed and adequate. Pain: 
Pain Scale 1: Numeric (0 - 10) (04/30/19 1217) Pain Intensity 1: 0 (04/30/19 1217) Managed Neurological Status:  
Neuro (WDL): Within Defined Limits (04/30/19 1555) At baseline Mental Status, Level of Consciousness: Alert and  oriented to person, place, and time Pulmonary Status:  
O2 Device: Nasal cannula (04/30/19 1555) Adequate oxygenation and airway patent Complications related to anesthesia: None Post-anesthesia assessment completed. No concerns Signed By: Karlene Jin MD   
 April 30, 2019 Procedure(s): CLOSURE LOOP ILEOSTOMY. general 
 
<BSHSIANPOST> Vitals Value Taken Time /68 4/30/2019  4:15 PM  
Temp Pulse 85 4/30/2019  4:24 PM  
Resp 13 4/30/2019  4:24 PM  
SpO2 99 % 4/30/2019  4:24 PM  
Vitals shown include unvalidated device data.

## 2019-04-30 NOTE — PERIOP NOTES
TRANSFER - OUT REPORT:    Verbal report given to [address] RN Shahriar(name) on [facility]  being transferred to [facility](unit) for routine post - op       Report consisted of patients Situation, Background, Assessment and   Recommendations(SBAR). Time Pre op antibiotic given:1405  Anesthesia Stop time: 3638  Acosta Present on Transfer to floor:y  Order for Acosta on Chart:y  Discharge Prescriptions with Chart:n    Information from the following report(s) SBAR, OR Summary, Intake/Output, MAR, Recent Results and Cardiac Rhythm NSR was reviewed with the receiving nurse. Opportunity for questions and clarification was provided. Is the patient on 02? NO       L/Min        Other     Is the patient on a monitor? NO    Is the nurse transporting with the patient? NO    Surgical Waiting Area notified of patient's transfer from PACU?  YES, via volunteer Paige Coreas      The following personal items collected during your admission accompanied patient upon transfer:   Dental Appliance: Dental Appliances: None  Vision:    Hearing Aid:    Jewelry:    Clothing: Clothing: (clothing bag placed at foot of pt's bed in pacu)  Other Valuables:    Valuables sent to safe:

## 2019-04-30 NOTE — H&P
Colorectal Surgery Pre-Operative History and Physical Examination Note          NAME:  Bakari Valle   :   1960   MRN:   [id number]     Operation Date: 2019    Chief Complaint: Temporary ileostomy. History of Present Illness: The patient is a 61-year-old female who was diagnosed with a [id number] (stage III) adenocarcinoma of the rectum on 2018. She completed neoadjuvant chemoradiation therapy, then underwent a hand-assisted laparoscopic low anterior resection, mobilization of the splenic flexure, creation of a colonic J-pouch, coloanal anastomosis, and creation of a loop ileostomy on 2018. The final pathologic stage was [id number]; i.e., she had a complete pathologic response to the neoadjuvant therapy. She subsequently received adjuvant chemotherapy that was discontinued on 3/22/2019. There is currently no evidence of malignancy, and a water-soluble contrast enema performed on 3/20/2019 revealed a widely patent anastomosis without evidence of extravasation of contrast.      PMH:  Past Medical History:   Diagnosis Date    Asthma     SEASONAL ALLERGY INDUCED    GERD (gastroesophageal reflux disease)     Nausea & vomiting     Rectal cancer ([address] 2018    Diagnosed via colonoscopy on 2018. Staged as [id number]. Treated with neoadjuvant chemoradiation using Xeloda and 5040 cGy radiation and with the final radiation dose administered on 2018. Resected on 2018. Treated with adjuvant chemotherapy that was not well tolerated and that was discontinued on 3/22/2019.  Thyroid disease     hyperthyroidism       PSH:  Past Surgical History:   Procedure Laterality Date    HX COLONOSCOPY  2018    Dr. Marlyn Nieto.  HX GI      Laparoscopic cholecystectomy.     HX GI  2018    Hand-assisted laparoscopic low anterior resection, mobilization of the splenic flexure, creation of colonic J-pouch, coloanal anastomosis, and creation of loop ileostomy;  Aba Soliman.  HX HEENT  2015    Sinus surgery.  HX ORTHOPAEDIC Right 1998    Right carpal tunnel release.  HX ORTHOPAEDIC  1982    Right ulnar nerve repositioning.  HX UROLOGICAL  2018    Cystoscopy and placement of temporary bilateral ureteral catheters; Anel Valenzuela MD.    IR INSERT TUNL CVC W PORT OVER 5 YEARS  2019    IR REMOVE TUNL CVAD W PORT/PUMP  2019       Home Medications:  Cannot display prior to admission medications because the patient has not been admitted in this contact. Hospital Medications:  No current facility-administered medications for this encounter. Current Outpatient Medications   Medication Sig    pantoprazole (PROTONIX) 20 mg tablet Take 1 Tab by mouth daily.  terconazole (TERAZOL 7) 0.4 % vaginal cream INSERT 1 APPLICATORFUL IN VAGINA EVERY EVENING FOR 7 DAYS IF NEEDED FOR YEAST INFECTION    methIMAzole (TAPAZOLE) 5 mg tablet Take 5 mg by mouth nightly.  loratadine (CLARITIN) 10 mg tablet Take 10 mg by mouth daily as needed.        Allergies:  No Known Allergies    Family History:  Family History   Problem Relation Age of Onset    Heart Disease Mother     Cancer Mother         CERVICAL    Hypertension Mother     Heart Disease Father     Hypertension Father     Heart Disease Brother     Anesth Problems Other        Social History:  Social History     Tobacco Use    Smoking status: Former Smoker     Packs/day: 0.50     Years: 10.00     Pack years: 5.00     Last attempt to quit:      Years since quittin.3    Smokeless tobacco: Never Used   Substance Use Topics    Alcohol use: No       Review of Systems:    Symptom Y/N Comments  Symptom Y/N Comments   Fever/Chills N   Chest Pain N    Cough N   Abdominal Pain N    Sputum    Joint Pain     SOB/MUNOZ N   Pruritis/Rash     Nausea/vomit N   Tolerating PT/OT     Diarrhea N   Tolerating Diet Y    Constipation N   Other       Could NOT obtain due to:        Objective:   No data found. No intake/output data recorded. No intake/output data recorded. PHYSICAL EXAMINATION:    GENERAL:  No distress. HEENT:  Anicteric. LUNGS:  Clear to auscultation bilaterally. HEART:  Regular rate and rhythm with no murmurs, gallops, or rubs. ABDOMEN:  Soft. Non-tender. Well healed scars. Healhty-appearing right-sided loop ileostomy. EXTREMITIES:  No edema. NEURO:  Alert and oriented. Data Review   Results for Dimitri Mistry (MRN [id number]) as of 4/30/2019 09:37   4/15/2019 09:53   WBC 4.6   HGB 11.5   HCT 36.4   PLATELET 489   Sodium 136   Potassium 4.3   Chloride 105   CO2 23   Glucose 91   BUN 12   Creatinine 0.74   Calcium 9.1   GFR est non-AA >60   Bilirubin, total 0.4   Protein, total 6.9   Albumin 3.3 (L)   ALT (SGPT) 34   AST 27   Alk. phosphatase 117                     Assessment and Plan:      The loop ileostomy was intended to be temporary, and it is no longer needed. Closure is therefore indicated. The risks have been discussed in detail, and the patient has agreed to proceed.       Principal Problem:    Ileostomy present ([facility] [address] (4/30/2019)    Active Problems:    Hyperthyroidism (12/13/2018)

## 2019-04-30 NOTE — ANESTHESIA PREPROCEDURE EVALUATION
Relevant Problems No relevant active problems Anesthetic History No history of anesthetic complications PONV Review of Systems / Medical History Patient summary reviewed, nursing notes reviewed and pertinent labs reviewed Pulmonary Within defined limits Asthma Neuro/Psych Within defined limits Cardiovascular Within defined limits GI/Hepatic/Renal 
Within defined limits GERD Endo/Other Within defined limits Hypothyroidism Obesity and cancer Other Findings Physical Exam 
 
Airway Mallampati: II 
TM Distance: > 6 cm Neck ROM: normal range of motion Mouth opening: Normal 
 
 Cardiovascular Regular rate and rhythm,  S1 and S2 normal,  no murmur, click, rub, or gallop Dental 
No notable dental hx Pulmonary Breath sounds clear to auscultation Abdominal 
GI exam deferred Other Findings Anesthetic Plan ASA: 2 Anesthesia type: general 
 
 
 
 
Induction: Intravenous Anesthetic plan and risks discussed with: Patient

## 2019-04-30 NOTE — BRIEF OP NOTE
BRIEF OPERATIVE NOTE    Date of Procedure:  4/30/2019   Preoperative Diagnosis:   Temporary ileostomy. Postoperative Diagnosis:  Temporary ileostomy. Procedure:  Ileostomy closure with resection and anastomosis. Surgeon:  Arlinda Snellen, MD  Assistant:  Petey Sevilla. Gela Tran, [facility]  Anesthesia:  General endotracheal  Estimated Blood Loss:  25 mL  Crystalloid:  1250 mL  Urine:  100 mL  Specimens:   ID Type Source Tests Collected by Time Destination   ILEOSTOMY Fresh Other                  Ruben Hart MD 4/30/2019 1503 Pathology     Tubes and Drains:  None. Findings:  Healthy ileum. Complications: None apparent. Implants:  None.

## 2019-05-01 LAB
ANION GAP SERPL CALC-SCNC: 7 MMOL/L (ref 5–15)
BASOPHILS # BLD: 0 K/UL (ref 0–0.1)
BASOPHILS NFR BLD: 0 % (ref 0–1)
BUN SERPL-MCNC: 9 MG/DL (ref 6–20)
BUN/CREAT SERPL: 11 (ref 12–20)
CALCIUM SERPL-MCNC: 9 MG/DL (ref 8.5–10.1)
CHLORIDE SERPL-SCNC: 107 MMOL/L (ref 97–108)
CO2 SERPL-SCNC: 23 MMOL/L (ref 21–32)
CREAT SERPL-MCNC: 0.84 MG/DL (ref 0.55–1.02)
DIFFERENTIAL METHOD BLD: ABNORMAL
EOSINOPHIL # BLD: 0 K/UL (ref 0–0.4)
EOSINOPHIL NFR BLD: 0 % (ref 0–7)
ERYTHROCYTE [DISTWIDTH] IN BLOOD BY AUTOMATED COUNT: 15.7 % (ref 11.5–14.5)
GLUCOSE SERPL-MCNC: 134 MG/DL (ref 65–100)
HCT VFR BLD AUTO: 35.2 % (ref 35–47)
HGB BLD-MCNC: 11 G/DL (ref 11.5–16)
IMM GRANULOCYTES # BLD AUTO: 0 K/UL (ref 0–0.04)
IMM GRANULOCYTES NFR BLD AUTO: 0 % (ref 0–0.5)
LYMPHOCYTES # BLD: 0.5 K/UL (ref 0.8–3.5)
LYMPHOCYTES NFR BLD: 6 % (ref 12–49)
MAGNESIUM SERPL-MCNC: 2 MG/DL (ref 1.6–2.4)
MCH RBC QN AUTO: 27.1 PG (ref 26–34)
MCHC RBC AUTO-ENTMCNC: 31.3 G/DL (ref 30–36.5)
MCV RBC AUTO: 86.7 FL (ref 80–99)
MONOCYTES # BLD: 0.3 K/UL (ref 0–1)
MONOCYTES NFR BLD: 4 % (ref 5–13)
NEUTS SEG # BLD: 7.1 K/UL (ref 1.8–8)
NEUTS SEG NFR BLD: 90 % (ref 32–75)
NRBC # BLD: 0 K/UL (ref 0–0.01)
NRBC BLD-RTO: 0 PER 100 WBC
PHOSPHATE SERPL-MCNC: 4.1 MG/DL (ref 2.6–4.7)
PLATELET # BLD AUTO: 201 K/UL (ref 150–400)
PMV BLD AUTO: 10.6 FL (ref 8.9–12.9)
POTASSIUM SERPL-SCNC: 4.4 MMOL/L (ref 3.5–5.1)
RBC # BLD AUTO: 4.06 M/UL (ref 3.8–5.2)
SODIUM SERPL-SCNC: 137 MMOL/L (ref 136–145)
WBC # BLD AUTO: 7.9 K/UL (ref 3.6–11)

## 2019-05-01 PROCEDURE — 74011250637 HC RX REV CODE- 250/637: Performed by: COLON & RECTAL SURGERY

## 2019-05-01 PROCEDURE — 65270000032 HC RM SEMIPRIVATE

## 2019-05-01 PROCEDURE — 36415 COLL VENOUS BLD VENIPUNCTURE: CPT

## 2019-05-01 PROCEDURE — 83735 ASSAY OF MAGNESIUM: CPT

## 2019-05-01 PROCEDURE — 85025 COMPLETE CBC W/AUTO DIFF WBC: CPT

## 2019-05-01 PROCEDURE — 80048 BASIC METABOLIC PNL TOTAL CA: CPT

## 2019-05-01 PROCEDURE — 77030027138 HC INCENT SPIROMETER -A

## 2019-05-01 PROCEDURE — 74011250636 HC RX REV CODE- 250/636: Performed by: COLON & RECTAL SURGERY

## 2019-05-01 PROCEDURE — 84100 ASSAY OF PHOSPHORUS: CPT

## 2019-05-01 RX ORDER — DEXTROSE, SODIUM CHLORIDE, SODIUM LACTATE, POTASSIUM CHLORIDE, AND CALCIUM CHLORIDE 5; .6; .31; .03; .02 G/100ML; G/100ML; G/100ML; G/100ML; G/100ML
75 INJECTION, SOLUTION INTRAVENOUS CONTINUOUS
Status: DISPENSED | OUTPATIENT
Start: 2019-05-01 | End: 2019-05-01

## 2019-05-01 RX ADMIN — CELECOXIB 200 MG: 100 CAPSULE ORAL at 22:53

## 2019-05-01 RX ADMIN — KETOROLAC TROMETHAMINE 15 MG: 30 INJECTION, SOLUTION INTRAMUSCULAR at 14:21

## 2019-05-01 RX ADMIN — OXYCODONE HYDROCHLORIDE 5 MG: 5 TABLET ORAL at 16:27

## 2019-05-01 RX ADMIN — ENOXAPARIN SODIUM 40 MG: 100 INJECTION SUBCUTANEOUS at 09:07

## 2019-05-01 RX ADMIN — SODIUM CHLORIDE, SODIUM LACTATE, POTASSIUM CHLORIDE, CALCIUM CHLORIDE, AND DEXTROSE MONOHYDRATE 100 ML/HR: 600; 310; 30; 20; 5 INJECTION, SOLUTION INTRAVENOUS at 03:22

## 2019-05-01 RX ADMIN — METHIMAZOLE 5 MG: 5 TABLET ORAL at 22:54

## 2019-05-01 RX ADMIN — KETOROLAC TROMETHAMINE 15 MG: 30 INJECTION, SOLUTION INTRAMUSCULAR at 06:19

## 2019-05-01 RX ADMIN — ACETAMINOPHEN 1000 MG: 500 TABLET ORAL at 00:13

## 2019-05-01 RX ADMIN — SODIUM CHLORIDE, SODIUM LACTATE, POTASSIUM CHLORIDE, CALCIUM CHLORIDE, AND DEXTROSE MONOHYDRATE 75 ML/HR: 600; 310; 30; 20; 5 INJECTION, SOLUTION INTRAVENOUS at 15:48

## 2019-05-01 RX ADMIN — ACETAMINOPHEN 1000 MG: 500 TABLET ORAL at 18:55

## 2019-05-01 RX ADMIN — KETOROLAC TROMETHAMINE 15 MG: 30 INJECTION, SOLUTION INTRAMUSCULAR at 00:13

## 2019-05-01 RX ADMIN — ACETAMINOPHEN 1000 MG: 500 TABLET ORAL at 14:20

## 2019-05-01 RX ADMIN — PANTOPRAZOLE SODIUM 20 MG: 20 TABLET, DELAYED RELEASE ORAL at 09:07

## 2019-05-01 RX ADMIN — ACETAMINOPHEN 1000 MG: 500 TABLET ORAL at 06:19

## 2019-05-01 RX ADMIN — Medication 10 ML: at 05:15

## 2019-05-01 NOTE — PROGRESS NOTES
NUTRITION     Completed a Low Fiber diet instruction with patient. Also included information on how to increase fiber back into diet only when advised by physician to do so. Provided appropriate dietary handouts and answered all questions.       FAUSTO RiveraR

## 2019-05-01 NOTE — PROGRESS NOTES
Bedside shift change report given to [facility] (oncoming nurse) by Analy Bryant (offgoing nurse). Report included the following information SBAR, Kardex, Procedure Summary, Intake/Output, MAR and Accordion.

## 2019-05-01 NOTE — PROGRESS NOTES
General Daily Progress Note    Admission Date: 4/30/2019  Hospital Day 2  Post-Op Day 1  Subjective:   No nausea this morning. Pain well controlled. Wants to try solid food. Objective:     Patient Vitals for the past 24 hrs:   BP Temp Pulse Resp SpO2 Height Weight   05/01/19 0729 119/74 97.7 °F (36.5 °C) 74 18 97 % -- --   05/01/19 0342 107/69 97.6 °F (36.4 °C) 65 16 97 % -- --   04/30/19 2315 113/60 97.9 °F (36.6 °C) 83 18 98 % -- --   04/30/19 1849 109/73 97.7 °F (36.5 °C) 85 18 94 % -- --   04/30/19 1755 -- -- 85 13 98 % -- --   04/30/19 1750 -- -- 77 14 99 % -- --   04/30/19 1745 -- -- 76 16 99 % -- --   04/30/19 1740 -- -- 77 15 99 % -- --   04/30/19 1735 -- -- 77 15 99 % -- --   04/30/19 1730 -- -- 77 16 98 % -- --   04/30/19 1725 -- -- 76 15 98 % -- --   04/30/19 1720 -- -- 88 12 97 % -- --   04/30/19 1715 135/76 -- 93 16 98 % -- --   04/30/19 1710 -- -- 71 25 99 % -- --   04/30/19 1705 -- -- 74 17 99 % -- --   04/30/19 1700 118/65 -- 76 14 99 % -- --   04/30/19 1655 -- -- 76 16 99 % -- --   04/30/19 1651 -- 98.2 °F (36.8 °C) -- -- -- -- --   04/30/19 1650 -- -- 76 18 99 % -- --   04/30/19 1645 121/68 -- 69 14 99 % -- --   04/30/19 1640 -- -- 77 13 99 % -- --   04/30/19 1635 -- -- 82 12 98 % -- --   04/30/19 1630 125/70 -- 81 12 99 % -- --   04/30/19 1625 -- -- 77 14 98 % -- --   04/30/19 1620 -- -- 87 15 98 % -- --   04/30/19 1615 133/68 -- 87 16 98 % -- --   04/30/19 1610 -- -- 83 13 99 % -- --   04/30/19 1605 -- -- 84 13 98 % -- --   04/30/19 1600 135/69 -- 90 12 98 % -- --   04/30/19 1555 135/77 -- 88 13 97 % -- --   04/30/19 1550 130/67 98.2 °F (36.8 °C) 97 14 97 % -- --   04/30/19 1217 139/72 97.8 °F (36.6 °C) -- 15 97 % 5' 6.5\" (1.689 m) 87.5 kg (193 lb)     No intake/output data recorded. 04/29 1901 - 05/01 0700  In: 1600 [I.V.:1600]  Out: 665 [Urine:640]    Physical Examination:  General Appearance - No distress. Chest - Clear to auscultation.   Heart - Normal rate and regular rhythm. Abdomen - Non-distended. Appropriately tender. Dressing clean, dry, and intact.  - Acosta catheter draining dilute urine. Extremities - Pneumatic compression stockings in use. Data Review   Recent Results (from the past 24 hour(s))   CBC WITH AUTOMATED DIFF    Collection Time: 04/30/19  4:33 PM   Result Value Ref Range    WBC 5.3 3.6 - 11.0 K/uL    RBC 4.38 3.80 - 5.20 M/uL    HGB 11.8 11.5 - 16.0 g/dL    HCT 38.2 35.0 - 47.0 %    MCV 87.2 80.0 - 99.0 FL    MCH 26.9 26.0 - 34.0 PG    MCHC 30.9 30.0 - 36.5 g/dL    RDW 15.9 (H) 11.5 - 14.5 %    PLATELET 855 075 - 274 K/uL    MPV 10.3 8.9 - 12.9 FL    NRBC 0.0 0  WBC    ABSOLUTE NRBC 0.00 0.00 - 0.01 K/uL    NEUTROPHILS 86 (H) 32 - 75 %    LYMPHOCYTES 11 (L) 12 - 49 %    MONOCYTES 2 (L) 5 - 13 %    EOSINOPHILS 1 0 - 7 %    BASOPHILS 0 0 - 1 %    IMMATURE GRANULOCYTES 0 0.0 - 0.5 %    ABS. NEUTROPHILS 4.5 1.8 - 8.0 K/UL    ABS. LYMPHOCYTES 0.6 (L) 0.8 - 3.5 K/UL    ABS. MONOCYTES 0.1 0.0 - 1.0 K/UL    ABS. EOSINOPHILS 0.1 0.0 - 0.4 K/UL    ABS. BASOPHILS 0.0 0.0 - 0.1 K/UL    ABS. IMM.  GRANS. 0.0 0.00 - 0.04 K/UL    DF SMEAR SCANNED      RBC COMMENTS ANISOCYTOSIS  1+       METABOLIC PANEL, BASIC    Collection Time: 04/30/19  4:33 PM   Result Value Ref Range    Sodium 138 136 - 145 mmol/L    Potassium 4.4 3.5 - 5.1 mmol/L    Chloride 106 97 - 108 mmol/L    CO2 26 21 - 32 mmol/L    Anion gap 6 5 - 15 mmol/L    Glucose 137 (H) 65 - 100 mg/dL    BUN 10 6 - 20 MG/DL    Creatinine 1.09 (H) 0.55 - 1.02 MG/DL    BUN/Creatinine ratio 9 (L) 12 - 20      GFR est AA >60 >60 ml/min/1.73m2    GFR est non-AA 52 (L) >60 ml/min/1.73m2    Calcium 8.9 8.5 - 10.1 MG/DL   MAGNESIUM    Collection Time: 04/30/19  4:33 PM   Result Value Ref Range    Magnesium 2.0 1.6 - 2.4 mg/dL   PHOSPHORUS    Collection Time: 04/30/19  4:33 PM   Result Value Ref Range    Phosphorus 4.5 2.6 - 4.7 MG/DL   CBC WITH AUTOMATED DIFF    Collection Time: 05/01/19  3:34 AM   Result Value Ref Range    WBC 7.9 3.6 - 11.0 K/uL    RBC 4.06 3.80 - 5.20 M/uL    HGB 11.0 (L) 11.5 - 16.0 g/dL    HCT 35.2 35.0 - 47.0 %    MCV 86.7 80.0 - 99.0 FL    MCH 27.1 26.0 - 34.0 PG    MCHC 31.3 30.0 - 36.5 g/dL    RDW 15.7 (H) 11.5 - 14.5 %    PLATELET 404 270 - 351 K/uL    MPV 10.6 8.9 - 12.9 FL    NRBC 0.0 0  WBC    ABSOLUTE NRBC 0.00 0.00 - 0.01 K/uL    NEUTROPHILS 90 (H) 32 - 75 %    LYMPHOCYTES 6 (L) 12 - 49 %    MONOCYTES 4 (L) 5 - 13 %    EOSINOPHILS 0 0 - 7 %    BASOPHILS 0 0 - 1 %    IMMATURE GRANULOCYTES 0 0.0 - 0.5 %    ABS. NEUTROPHILS 7.1 1.8 - 8.0 K/UL    ABS. LYMPHOCYTES 0.5 (L) 0.8 - 3.5 K/UL    ABS. MONOCYTES 0.3 0.0 - 1.0 K/UL    ABS. EOSINOPHILS 0.0 0.0 - 0.4 K/UL    ABS. BASOPHILS 0.0 0.0 - 0.1 K/UL    ABS. IMM. GRANS. 0.0 0.00 - 0.04 K/UL    DF AUTOMATED     METABOLIC PANEL, BASIC    Collection Time: 05/01/19  3:34 AM   Result Value Ref Range    Sodium 137 136 - 145 mmol/L    Potassium 4.4 3.5 - 5.1 mmol/L    Chloride 107 97 - 108 mmol/L    CO2 23 21 - 32 mmol/L    Anion gap 7 5 - 15 mmol/L    Glucose 134 (H) 65 - 100 mg/dL    BUN 9 6 - 20 MG/DL    Creatinine 0.84 0.55 - 1.02 MG/DL    BUN/Creatinine ratio 11 (L) 12 - 20      GFR est AA >60 >60 ml/min/1.73m2    GFR est non-AA >60 >60 ml/min/1.73m2    Calcium 9.0 8.5 - 10.1 MG/DL   MAGNESIUM    Collection Time: 05/01/19  3:34 AM   Result Value Ref Range    Magnesium 2.0 1.6 - 2.4 mg/dL   PHOSPHORUS    Collection Time: 05/01/19  3:34 AM   Result Value Ref Range    Phosphorus 4.1 2.6 - 4.7 MG/DL           Assessment:     Principal Problem:    Ileostomy present ([address] (4/30/2019)    Active Problems:    Hyperthyroidism (12/13/2018)      Ileostomy in place [facility]) (4/30/2019)        1 Day Post-Op s/p ileostomy closure with resection and anastomosis. Hemodynamically stable. Afebrile. Good urine output and normal creatinine. Plan:   Begin low fiber diet. Decrease IV rate. Remove Acosta catheter. Ambulate. Incentive spirometer.

## 2019-05-01 NOTE — PROGRESS NOTES
Physical Therapy  Order received, chart reviewed, evaluation held. Per pt, , and RN, pt up and walking ad bob with supervision. Pt reports no skilled needs at this time. PT verbally educated pt on logroll for pain management, pillow bracing, and gently stretches for adhesion prevention. Pt and  express understanding and decline practice as she wishes to walk after lunch with her family. RN notified. Please contact with any additional questions/concerns prior to return home.   Thank you,  Sunshine Simms, PT, DPT

## 2019-05-02 LAB
ANION GAP SERPL CALC-SCNC: 8 MMOL/L (ref 5–15)
BASOPHILS # BLD: 0 K/UL (ref 0–0.1)
BASOPHILS NFR BLD: 0 % (ref 0–1)
BUN SERPL-MCNC: 10 MG/DL (ref 6–20)
BUN/CREAT SERPL: 13 (ref 12–20)
CALCIUM SERPL-MCNC: 8.6 MG/DL (ref 8.5–10.1)
CHLORIDE SERPL-SCNC: 108 MMOL/L (ref 97–108)
CO2 SERPL-SCNC: 23 MMOL/L (ref 21–32)
CREAT SERPL-MCNC: 0.79 MG/DL (ref 0.55–1.02)
DIFFERENTIAL METHOD BLD: ABNORMAL
EOSINOPHIL # BLD: 0.1 K/UL (ref 0–0.4)
EOSINOPHIL NFR BLD: 1 % (ref 0–7)
ERYTHROCYTE [DISTWIDTH] IN BLOOD BY AUTOMATED COUNT: 16.2 % (ref 11.5–14.5)
GLUCOSE SERPL-MCNC: 84 MG/DL (ref 65–100)
HCT VFR BLD AUTO: 37.1 % (ref 35–47)
HGB BLD-MCNC: 11.1 G/DL (ref 11.5–16)
IMM GRANULOCYTES # BLD AUTO: 0 K/UL (ref 0–0.04)
IMM GRANULOCYTES NFR BLD AUTO: 0 % (ref 0–0.5)
LYMPHOCYTES # BLD: 0.8 K/UL (ref 0.8–3.5)
LYMPHOCYTES NFR BLD: 15 % (ref 12–49)
MCH RBC QN AUTO: 26.7 PG (ref 26–34)
MCHC RBC AUTO-ENTMCNC: 29.9 G/DL (ref 30–36.5)
MCV RBC AUTO: 89.2 FL (ref 80–99)
MONOCYTES # BLD: 0.4 K/UL (ref 0–1)
MONOCYTES NFR BLD: 7 % (ref 5–13)
NEUTS SEG # BLD: 3.8 K/UL (ref 1.8–8)
NEUTS SEG NFR BLD: 77 % (ref 32–75)
NRBC # BLD: 0 K/UL (ref 0–0.01)
NRBC BLD-RTO: 0 PER 100 WBC
PLATELET # BLD AUTO: 179 K/UL (ref 150–400)
PMV BLD AUTO: 10.8 FL (ref 8.9–12.9)
POTASSIUM SERPL-SCNC: 3.9 MMOL/L (ref 3.5–5.1)
RBC # BLD AUTO: 4.16 M/UL (ref 3.8–5.2)
RBC MORPH BLD: ABNORMAL
SODIUM SERPL-SCNC: 139 MMOL/L (ref 136–145)
WBC # BLD AUTO: 5.1 K/UL (ref 3.6–11)

## 2019-05-02 PROCEDURE — 85025 COMPLETE CBC W/AUTO DIFF WBC: CPT

## 2019-05-02 PROCEDURE — 74011250636 HC RX REV CODE- 250/636: Performed by: COLON & RECTAL SURGERY

## 2019-05-02 PROCEDURE — 65270000032 HC RM SEMIPRIVATE

## 2019-05-02 PROCEDURE — 74011250637 HC RX REV CODE- 250/637: Performed by: COLON & RECTAL SURGERY

## 2019-05-02 PROCEDURE — 36415 COLL VENOUS BLD VENIPUNCTURE: CPT

## 2019-05-02 PROCEDURE — 80048 BASIC METABOLIC PNL TOTAL CA: CPT

## 2019-05-02 RX ADMIN — Medication 10 ML: at 06:00

## 2019-05-02 RX ADMIN — ENOXAPARIN SODIUM 40 MG: 100 INJECTION SUBCUTANEOUS at 10:28

## 2019-05-02 RX ADMIN — ONDANSETRON 4 MG: 4 TABLET, ORALLY DISINTEGRATING ORAL at 17:36

## 2019-05-02 RX ADMIN — CELECOXIB 200 MG: 100 CAPSULE ORAL at 18:21

## 2019-05-02 RX ADMIN — CELECOXIB 200 MG: 100 CAPSULE ORAL at 08:20

## 2019-05-02 RX ADMIN — ACETAMINOPHEN 1000 MG: 500 TABLET ORAL at 02:00

## 2019-05-02 RX ADMIN — PANTOPRAZOLE SODIUM 20 MG: 20 TABLET, DELAYED RELEASE ORAL at 08:20

## 2019-05-02 RX ADMIN — ACETAMINOPHEN 1000 MG: 500 TABLET ORAL at 18:21

## 2019-05-02 RX ADMIN — ACETAMINOPHEN 1000 MG: 500 TABLET ORAL at 12:08

## 2019-05-02 RX ADMIN — ACETAMINOPHEN 1000 MG: 500 TABLET ORAL at 07:22

## 2019-05-02 RX ADMIN — Medication 10 ML: at 14:00

## 2019-05-02 RX ADMIN — Medication 10 ML: at 21:15

## 2019-05-02 RX ADMIN — METHIMAZOLE 5 MG: 5 TABLET ORAL at 21:14

## 2019-05-02 NOTE — PROGRESS NOTES
Bedside shift change report given to [address] (oncoming nurse) by Aashish Lux RN (offgoing nurse). Report included the following information SBAR, Kardex, Procedure Summary, Intake/Output, MAR and Recent Results.

## 2019-05-02 NOTE — PROGRESS NOTES
General Daily Progress Note    Admission Date: 4/30/2019  Hospital Day 3  Post-Op Day 2  Subjective:   Good pain control. No nausea. Small amount of flatus, but no stool. Voiding well. Objective:     Patient Vitals for the past 24 hrs:   BP Temp Pulse Resp SpO2   05/02/19 0757 118/72 98.8 °F (37.1 °C) 82 16 98 %   05/01/19 2327 129/77 97.9 °F (36.6 °C) 64 18 100 %   05/01/19 2001 147/76 98.7 °F (37.1 °C) 78 18 100 %   05/01/19 1531 127/78 98.7 °F (37.1 °C) 82 18 97 %   05/01/19 1140 115/63 98.1 °F (36.7 °C) 66 18 97 %     No intake/output data recorded. 04/30 1901 - 05/02 0700  In: 960 [P.O.:960]  Out: 1600 [Urine:1600]    Physical Examination:  General Appearance - No distress. Abdomen - Non-distended. Appropriately tender. Dressing clean, dry, and intact. Extremities - Pneumatic compression stockings in use.                     Data Review   Recent Results (from the past 24 hour(s))   CBC WITH AUTOMATED DIFF    Collection Time: 05/02/19  7:31 AM   Result Value Ref Range    WBC 5.1 3.6 - 11.0 K/uL    RBC 4.16 3.80 - 5.20 M/uL    HGB 11.1 (L) 11.5 - 16.0 g/dL    HCT 37.1 35.0 - 47.0 %    MCV 89.2 80.0 - 99.0 FL    MCH 26.7 26.0 - 34.0 PG    MCHC 29.9 (L) 30.0 - 36.5 g/dL    RDW 16.2 (H) 11.5 - 14.5 %    PLATELET 397 690 - 853 K/uL    MPV 10.8 8.9 - 12.9 FL    NRBC 0.0 0  WBC    ABSOLUTE NRBC 0.00 0.00 - 0.01 K/uL    NEUTROPHILS PENDING %    LYMPHOCYTES PENDING %    MONOCYTES PENDING %    EOSINOPHILS PENDING %    BASOPHILS PENDING %    IMMATURE GRANULOCYTES PENDING %    ABS. NEUTROPHILS PENDING K/UL    ABS. LYMPHOCYTES PENDING K/UL    ABS. MONOCYTES PENDING K/UL    ABS. EOSINOPHILS PENDING K/UL    ABS. BASOPHILS PENDING K/UL    ABS. IMM. GRANS.  PENDING K/UL    DF PENDING    METABOLIC PANEL, BASIC    Collection Time: 05/02/19  7:31 AM   Result Value Ref Range    Sodium 139 136 - 145 mmol/L    Potassium 3.9 3.5 - 5.1 mmol/L    Chloride 108 97 - 108 mmol/L    CO2 23 21 - 32 mmol/L    Anion gap 8 5 - 15 mmol/L    Glucose 84 65 - 100 mg/dL    BUN 10 6 - 20 MG/DL    Creatinine 0.79 0.55 - 1.02 MG/DL    BUN/Creatinine ratio 13 12 - 20      GFR est AA >60 >60 ml/min/1.73m2    GFR est non-AA >60 >60 ml/min/1.73m2    Calcium 8.6 8.5 - 10.1 MG/DL           Assessment:     Principal Problem:    Ileostomy present ([address] (4/30/2019)    Active Problems:    Hyperthyroidism (12/13/2018)      Ileostomy in place [facility]) (4/30/2019)        2 Days Post-Op s/p ileostomy closure with resection and anastomosis.     Hemodynamically stable. Afebrile. Good urine output and normal creatinine. Awaiting return of GI function. Plan:   Continue low fiber diet. Ambulate. Incentive spirometer. Will remove monik this evening.

## 2019-05-02 NOTE — OP NOTES
[address]   OPERATIVE REPORT    Name:  Patrick Saucedo  MR#:  [id number]  :  1960  ACCOUNT #:  [de-identified]    DATE OF SERVICE:  2019    PREOPERATIVE DIAGNOSIS:  Temporary ileostomy. POSTOPERATIVE DIAGNOSIS:  Temporary ileostomy. PROCEDURE PERFORMED:  Ileostomy closure with resection and anastomosis. SURGEON:  Giorgi Hudson MD    ASSISTANT:  Jacky Dunn    ANESTHESIA:  General endotracheal.    SPECIMENS REMOVED:  Ileostomy. TUBES AND DRAINS:  None. ESTIMATED BLOOD LOSS:  25 mL    CRYSTALLOID:  1250 mL    URINE OUTPUT:  856 mL    COMPLICATIONS:  None apparent. IMPLANTS:  None. INDICATIONS FOR PROCEDURE:  The patient is a 60-year-old female who was diagnosed with a [id number] (stage III) adenocarcinoma of the rectum on 2018. She completed neoadjuvant chemoradiation therapy, then underwent a hand-assisted laparoscopic lower anterior resection, mobilization of the splenic flexure, creation of a colonic J-pouch, coloanal anastomosis, and creation of a loop ileostomy on 2018. The final pathologic stage was [id number]; i.e., she had a complete pathologic response to the neoadjuvant therapy. She subsequently received adjuvant chemotherapy that was discontinued on 2019. There is currently no evidence of malignancy, and a water-soluble contrast enema performed on 2019 revealed a widely patent anastomosis without evidence of extravasation of contrast.  The loop ileostomy no longer being needed, closure was indicated. The risks were discussed in detail, and the patient agreed to proceed. OPERATIVE FINDINGS:  The ileum appeared to be healthy. Adhesions were minimal.    DESCRIPTION OF PROCEDURE:  After informed consent was obtained, the patient was taken to the operating room where standard monitoring devices were attached and adequate general endotracheal anesthesia was induced.   She was positioned supine on the operating table with the lower extremities fitted with pneumatic compression stockings. Using standard sterile technique, a Acosta catheter was placed in the bladder. Two grams of Cefotetan were administered parenterally. The patient had undergone a limited mechanical bowel preparation. The ostomy appliance was removed, and the two limbs of the loop ileostomy were each closed with 2-0 silk pursestring sutures. The abdomen was then prepped and draped in the usual sterile fashion. Using a scalpel, the ileostomy was circumscribed with a transverse incision. The electrocautery was then used to dissect through the subcutaneous tissues and the adhesions to the anterior abdominal wall, including to the fascia. In some places scissors were used, but most of the dissection was performed using cautery. Great care was taken to avoid injuring the bowel. Gradually, the ileal loop was completely mobilized and delivered. Adhesions to the anterior abdominal wall were minimal.  The loop was divided, and the MADDIE 75 stapling device with a blue cartridge was used to create a side-to-side (functional end-to-end) ileoileostomy. The two arms of the stapler had been inserted through enterotomies deliberately created using electrocautery. The anastomosis was completed and the ileostomy itself was resected by firing a TA 60 stapling device with a blue cartridge transversely and then dividing the bowel distal to the stapler using a scalpel. Multiple 3-0 silk seromuscular sutures were used to reinforce the crotch of the anastomosis, to dunk the transverse staple line ends, and to selectively reinforce the transverse staple line. The peritoneum that remained attached to the bowel was also used to partially cover the anastomosis. Upon completion, inspection and palpation revealed the anastomosis to be widely patent and apparently well vascularized. The bowel was returned to the abdominal cavity.   Gloves were changed and then the fascia was closed with multiple interrupted #1 Vicryl sutures. The wound was copiously irrigated with saline, and then additional #1 Vicryl sutures were used to reapproximate the subcutaneous tissues. A third layer of sutures, this time 3-0 Vicryl, was placed to further close the wound. Then, after irrigating the wound oncemore, the skin was loosely closed with staples and two Telfa monik were inserted into gaps between the staples. An external dressing consisting of 4x4s and tape was applied. There were no apparent complications, and the patient appeared to have tolerated the procedure well. At its conclusion, she was extubated and transported in stable condition, with the Acosta catheter still in place, to the recovery room. All sponge, needle and instrument counts were correct.         Teresa Mullen MD      [id number]  D:  05/01/2019 18:57  T:  05/01/2019 19:03  JOB #:  [id number]  CC:  MD Irina Catalan MD Vita Pear, MD Ashton Pleasure, MD Tamra Camps, MD

## 2019-05-02 NOTE — PROGRESS NOTES
Bedside shift change report given to [address] (oncoming nurse) by Margaretta Spatz RN (offgoing nurse). Report included the following information SBAR, Kardex, Intake/Output and MAR.

## 2019-05-02 NOTE — PROGRESS NOTES
5/2/19; 10:30 -   CM participated in IDRs on patient. Patient has passed gas and is able to void on own. Patient has active bowel sounds but hasn't had a BM yet. Patient's plan includes: continue diet, increase ambulation, and taking out monik this evening, 5/2.   CRM: Angelita Nolen, MPH, [address] [place] [address]; Z: [phone]

## 2019-05-02 NOTE — PROGRESS NOTES
Bedside shift change report given to Sherri Rodrigez RN (oncoming nurse) by Mikayla Camargo RN(offgoing nurse). Report included the following information SBAR, Kardex, Procedure Summary, Intake/Output, MAR and Accordion.

## 2019-05-03 VITALS
HEIGHT: 67 IN | RESPIRATION RATE: 16 BRPM | OXYGEN SATURATION: 97 % | SYSTOLIC BLOOD PRESSURE: 125 MMHG | DIASTOLIC BLOOD PRESSURE: 78 MMHG | HEART RATE: 75 BPM | TEMPERATURE: 97.5 F | BODY MASS INDEX: 30.29 KG/M2 | WEIGHT: 193 LBS

## 2019-05-03 PROCEDURE — 74011250637 HC RX REV CODE- 250/637: Performed by: COLON & RECTAL SURGERY

## 2019-05-03 PROCEDURE — 94760 N-INVAS EAR/PLS OXIMETRY 1: CPT

## 2019-05-03 PROCEDURE — 77030008027

## 2019-05-03 RX ADMIN — CELECOXIB 200 MG: 100 CAPSULE ORAL at 09:41

## 2019-05-03 RX ADMIN — Medication 10 ML: at 07:28

## 2019-05-03 RX ADMIN — PANTOPRAZOLE SODIUM 20 MG: 20 TABLET, DELAYED RELEASE ORAL at 09:41

## 2019-05-03 RX ADMIN — ACETAMINOPHEN 1000 MG: 500 TABLET ORAL at 00:43

## 2019-05-03 RX ADMIN — ACETAMINOPHEN 1000 MG: 500 TABLET ORAL at 07:27

## 2019-05-03 NOTE — PROGRESS NOTES
General Daily Progress Note    Admission Date: 4/30/2019  Hospital Day 4  Post-Op Day 3  Subjective:   No nausea. Tolerating diet. Voiding well. Passing flatus and stool. Pain well controlled. Objective:     Patient Vitals for the past 24 hrs:   BP Temp Pulse Resp SpO2   05/03/19 0737 125/78 97.5 °F (36.4 °C) 75 16 97 %   05/02/19 2341 (!) 130/95 97.7 °F (36.5 °C) 78 16 99 %   05/02/19 2006 151/83 97.8 °F (36.6 °C) 66 16 97 %     No intake/output data recorded. No intake/output data recorded. Physical Examination:  General Appearance - No distress. Abdomen - Non-distended.  Appropriately tender.  Dressing clean, dry, and intact. Extremities - Pneumatic compression stockings in use. Data Review No results found for this or any previous visit (from the past 24 hour(s)). Assessment:     Principal Problem:    Ileostomy present ([address] (4/30/2019)    Active Problems:    Hyperthyroidism (12/13/2018)      Ileostomy in place [facility]) (4/30/2019)        3 Days Post-Op s/p ileostomy closure with resection and anastomosis.     Hemodynamically stable. Afebrile. Tiffany removed form wound yesterday evening. No evidence of infection or other complication.     GI function is returning. Fit for discharge to home.       Plan:   Discharge to home. Follow up with me on Monday 5/13/2019.

## 2019-05-03 NOTE — PROGRESS NOTES
Bedside shift change report given to Sumaya Davis RN (oncoming nurse) by Masha Garcia RN (offgoing nurse). Report included the following information SBAR, Kardex, Intake/Output and MAR.

## 2019-05-03 NOTE — PROGRESS NOTES
Hospital follow-up PCP transitional care appointment has been scheduled with BRIANA Daly for Wednesday, 5/8/19 at 3:00 p.m. Pending patient discharge.   Rosanne Bey, Care Management Specialist.

## 2019-05-03 NOTE — DISCHARGE SUMMARY
Discharge Summary     Patient ID:    Anahy Herndon  [id number]  85 y.o.  1960    Admission Date: 4/30/2019    Discharge Date: 5/3/2019    Admission Diagnoses:  1. Ileostomy  2. Hyperthyroidism  3. Obesity    Chronic Diagnoses:    Patient Active Problem List   Diagnosis Code    Mixed hyperlipidemia E78.2    Rectal cancer metastasized to intrapelvic lymph node (HCC) C20, C77.5    Class 1 obesity with serious comorbidity and body mass index (BMI) of 33.0 to 33.9 in adult E66.9, Z68.33    Hyperthyroidism E05.90    Obesity (BMI 30.0-34. 9) E66.9    Ileostomy present ([facility] Z93.2    Ileostomy in place [facility]) Z93.2       Discharge Diagnoses:  1. Ileostomy (closed)  2. Hyperthyroidism  3. Obesity      Hospital Problems as of 5/3/2019 Date Reviewed: 4/30/2019          Codes Class Noted - Resolved POA    * (Principal) Ileostomy present ([facility] (Chronic) ICD-10-CM: Z93.2  ICD-9-CM: V44.2  4/30/2019 - Present Yes        Ileostomy in place [facility]) ICD-10-CM: Z93.2  ICD-9-CM: V44.2  4/30/2019 - Present Unknown        Hyperthyroidism (Chronic) ICD-10-CM: E05.90  ICD-9-CM: 242.90  12/13/2018 - Present Yes              Procedure Performed:  Ileostomy closure with resection and anastomosis was performed by Dr. Aleisha Hoover on 4/30/2019. Discharge Medications:   Current Discharge Medication List      CONTINUE these medications which have NOT CHANGED    Details   pantoprazole (PROTONIX) 20 mg tablet Take 1 Tab by mouth daily. Qty: 30 Tab, Refills: 2    Associated Diagnoses: Gastroesophageal reflux disease without esophagitis      terconazole (TERAZOL 7) 0.4 % vaginal cream INSERT 1 APPLICATORFUL IN VAGINA EVERY EVENING FOR 7 DAYS IF NEEDED FOR YEAST INFECTION  Refills: 0      methIMAzole (TAPAZOLE) 5 mg tablet Take 5 mg by mouth nightly. loratadine (CLARITIN) 10 mg tablet Take 10 mg by mouth daily as needed. Diet:  Low fiber diet until seen for follow-up.     Activity:  No driving or lifting more than 10 lb. For two weeks. Wound Care:  Dry dressing to the wound changed daily and as needed until the wound has been dry and closed for at least 224 hours. Discharge Condition:  Improved compared to that upon admission. Disposition:  Home. Follow-up Care:  Dr. Ashley Abel at 10:30 AM on Monday 5/13/2019. Significant Diagnostic Studies:   Recent Labs     05/02/19 0731 05/01/19  0334   WBC 5.1 7.9   HGB 11.1* 11.0*   HCT 37.1 35.2    201     Recent Labs     05/02/19 0731 05/01/19  0334 04/30/19  1633    137 138   K 3.9 4.4 4.4    107 106   CO2 23 23 26   BUN 10 9 10   CREA 0.79 0.84 1.09*   GLU 84 134* 137*   CA 8.6 9.0 8.9   MG  --  2.0 2.0   PHOS  --  4.1 4.5         HOSPITAL COURSE & TREATMENT RENDERED:     The patient is a 60-year-old female who was diagnosed with a [id number] (stage III) adenocarcinoma of the rectum on 06/29/2018. She completed neoadjuvant chemoradiation therapy, then underwent a hand-assisted laparoscopic lower anterior resection, mobilization of the splenic flexure, creation of a colonic J-pouch, coloanal anastomosis, and creation of a loop ileostomy on 12/13/2018. The final pathologic stage was [id number]; i.e., she had a complete pathologic response to the neoadjuvant therapy. She subsequently received adjuvant chemotherapy that was discontinued on 03/22/2019. There is currently no evidence of malignancy, and a water-soluble contrast enema performed on 03/20/2019 revealed a widely patent anastomosis without evidence of extravasation of contrast.  The loop ileostomy no longer being needed, closure was indicated. The risks were discussed in detail, and the patient agreed to proceed. She was taken to the operating room on 4/30/3019, and there the above-listed procedure was performed without apparent complications. Post-operatively, the patient was transferred from the recovery room to the floor in stable condition.     Her hospital course was essentially unremarkable. She experienced a gradual return of gastrointestinal function, and her diet was advanced appropriately. On POD#3 (5/3/2019), she was tolerating a low fiber diet and moving her bowels. There was no evidence of any complication, and she was judged to be fit for discharge to home. Her condition upon discharge was improved compared to that upon admission, and she appeared to have understood all discharge and follow-up instructions.         Signed:  Maren Messer MD

## 2019-05-03 NOTE — DISCHARGE INSTRUCTIONS
Patient Discharge Instructions    Quinn Osborn / [id number] : 1960    Admitted 2019 Discharged: 5/3/2019     Take Home Medications     {Medication reconciliation information is now added to the patient's AVS automatically when it is printed. There is no need to use this SmartLink in discharge instructions. Highlight this text and delete it to clear this message}       · It is important that you take medications exactly as they are prescribed. · Keep your medication in the bottles provided by the pharmacist and keep a list of the medication names, dosages, and times to be taken in your wallet. · Do not take other medications without consulting your doctor. What To Do At Home  Recommended Diet:  You should continue to consume a low fiber diet until you have been seen for follow-up. Recommended Activity: You should not drive, lift more than 10 lb., or engage in strenuous activity until two weeks have passed since the operation. You should walk frequently to gradually regain your stamina, and you may climb stairs as tolerated. Hygiene: You may shower with the wound covered, but do not bathe or submerge your abdomen until you have been seen for follow-up. Wound Care:  Keep the wound covered with a dry dressing. Change the dressing once per day and as needed until the wound appears to be closed and there has been no drainage for at least 24 hours. Problems:  If you have a fever (temperature > 100.0 degrees Fahrenheit), if there is drainage from the wound that looks like pus, or if there is increasing redness around the incision, you should call Dr. Talon Worrell. Other:  If you have questions or other problems, call Dr. Talon Worerll. Follow-Up:  Please return to Dr. Eddie Tavarez office at 10:30 AM on 2019. Information obtained by :  I understand that if any problems occur once I am at home I am to contact my physician.     I understand and acknowledge receipt of the instructions indicated above.                                                                                                                                            Physician's or R.N.'s Signature                                                                  Date/Time                                                                                                                                              Patient or Representative Signature                                                          Date/Time

## 2019-05-03 NOTE — PROGRESS NOTES
Bedside shift change report given to Get Leija  (oncoming nurse) by Jose Bacon (offgoing nurse). Report included the following information SBAR, Kardex, Intake/Output and MAR.

## 2019-06-16 ENCOUNTER — PATIENT MESSAGE (OUTPATIENT)
Dept: ONCOLOGY | Age: 59
End: 2019-06-16

## 2019-06-18 ENCOUNTER — DOCUMENTATION ONLY (OUTPATIENT)
Dept: ONCOLOGY | Age: 59
End: 2019-06-18

## 2019-06-19 ENCOUNTER — HOSPITAL ENCOUNTER (OUTPATIENT)
Dept: CT IMAGING | Age: 59
Discharge: HOME | End: 2019-06-19
Attending: INTERNAL MEDICINE
Payer: COMMERCIAL

## 2019-06-19 DIAGNOSIS — C20 RECTAL CANCER METASTASIZED TO INTRAPELVIC LYMPH NODE (HCC): Chronic | ICD-10-CM

## 2019-06-19 DIAGNOSIS — C77.5 RECTAL CANCER METASTASIZED TO INTRAPELVIC LYMPH NODE (HCC): Chronic | ICD-10-CM

## 2019-06-19 PROCEDURE — 74177 CT ABD & PELVIS W/CONTRAST: CPT

## 2019-06-19 PROCEDURE — 74011000258 HC RX REV CODE- 258: Performed by: RADIOLOGY

## 2019-06-19 PROCEDURE — 74011636320 HC RX REV CODE- 636/320: Performed by: RADIOLOGY

## 2019-06-19 RX ORDER — SODIUM CHLORIDE 0.9 % (FLUSH) 0.9 %
10 SYRINGE (ML) INJECTION
Status: COMPLETED | OUTPATIENT
Start: 2019-06-19 | End: 2019-06-19

## 2019-06-19 RX ADMIN — IOPAMIDOL 100 ML: 755 INJECTION, SOLUTION INTRAVENOUS at 13:47

## 2019-06-19 RX ADMIN — Medication 10 ML: at 13:47

## 2019-06-19 RX ADMIN — SODIUM CHLORIDE 100 ML: 900 INJECTION, SOLUTION INTRAVENOUS at 13:47

## 2019-06-20 LAB
ALBUMIN SERPL-MCNC: 4.4 G/DL (ref 3.5–5.5)
ALBUMIN/GLOB SERPL: 1.5 {RATIO} (ref 1.2–2.2)
ALP SERPL-CCNC: 90 IU/L (ref 39–117)
ALT SERPL-CCNC: 20 IU/L (ref 0–32)
AST SERPL-CCNC: 20 IU/L (ref 0–40)
BASOPHILS # BLD AUTO: 0 X10E3/UL (ref 0–0.2)
BASOPHILS NFR BLD AUTO: 0 %
BILIRUB SERPL-MCNC: 0.3 MG/DL (ref 0–1.2)
BUN SERPL-MCNC: 10 MG/DL (ref 6–24)
BUN/CREAT SERPL: 14 (ref 9–23)
CALCIUM SERPL-MCNC: 9.4 MG/DL (ref 8.7–10.2)
CEA SERPL-MCNC: 0.9 NG/ML (ref 0–4.7)
CHLORIDE SERPL-SCNC: 101 MMOL/L (ref 96–106)
CO2 SERPL-SCNC: 20 MMOL/L (ref 20–29)
CREAT SERPL-MCNC: 0.73 MG/DL (ref 0.57–1)
EOSINOPHIL # BLD AUTO: 0.2 X10E3/UL (ref 0–0.4)
EOSINOPHIL NFR BLD AUTO: 4 %
ERYTHROCYTE [DISTWIDTH] IN BLOOD BY AUTOMATED COUNT: 14.2 % (ref 12.3–15.4)
GLOBULIN SER CALC-MCNC: 3 G/DL (ref 1.5–4.5)
GLUCOSE SERPL-MCNC: 86 MG/DL (ref 65–99)
HCT VFR BLD AUTO: 41.5 % (ref 34–46.6)
HGB BLD-MCNC: 12.6 G/DL (ref 11.1–15.9)
IMM GRANULOCYTES # BLD AUTO: 0 X10E3/UL (ref 0–0.1)
IMM GRANULOCYTES NFR BLD AUTO: 0 %
LYMPHOCYTES # BLD AUTO: 1 X10E3/UL (ref 0.7–3.1)
LYMPHOCYTES NFR BLD AUTO: 19 %
MCH RBC QN AUTO: 25.7 PG (ref 26.6–33)
MCHC RBC AUTO-ENTMCNC: 30.4 G/DL (ref 31.5–35.7)
MCV RBC AUTO: 85 FL (ref 79–97)
MONOCYTES # BLD AUTO: 0.3 X10E3/UL (ref 0.1–0.9)
MONOCYTES NFR BLD AUTO: 5 %
NEUTROPHILS # BLD AUTO: 3.7 X10E3/UL (ref 1.4–7)
NEUTROPHILS NFR BLD AUTO: 72 %
PLATELET # BLD AUTO: 242 X10E3/UL (ref 150–450)
POTASSIUM SERPL-SCNC: 4.5 MMOL/L (ref 3.5–5.2)
PROT SERPL-MCNC: 7.4 G/DL (ref 6–8.5)
RBC # BLD AUTO: 4.9 X10E6/UL (ref 3.77–5.28)
SODIUM SERPL-SCNC: 139 MMOL/L (ref 134–144)
WBC # BLD AUTO: 5.2 X10E3/UL (ref 3.4–10.8)

## 2019-06-24 ENCOUNTER — PATIENT OUTREACH (OUTPATIENT)
Dept: ONCOLOGY | Age: 59
End: 2019-06-24

## 2019-06-24 NOTE — LETTER
6/25/2019 9:28 AM 
 
Ms. Landeros [address] [facility] [address] [id number] Dear Deandra Parmar: The [address] [place] [address] Team is committed to your health and well-being. The Cancer Survivorship Program offers support to patients who have completed or are nearing completion of their cancer treatment. The program provides patients with their plan of care and resources to help support patients as they transition into the survivorship phase of their care. A survivorship care plan is a record of your cancer and treatment history, as well as any checkups or follow-up tests you need in the future. It will also include a list of possible long-term effects of your treatments and ideas for staying healthy. Your plan will give you recommendations of when you need to get follow-up tests, and which doctors are responsible for your care. Keep your plan and refer to it so you can be sure you get the tests you need. Your plan includes important information about your cancer and treatment, which helps you and your doctors understand each other. Bring it with you whenever you go to the doctor. Please review your enclosed survivorship care plan and contact me so I may answer any questions and provide additional resources. Ashley Andino RN, OCN Cancer Survivorship  [employer] [phone] [email or web address]

## 2019-06-24 NOTE — PROGRESS NOTES
This Survivorship Care Plan is a cancer treatment summary and follow up plan and is provided to you to keep with your health plan records and to share with your primary care provider or any of your doctors and nurses. This summary is a brief record of major aspects of your cancer treatment and not a detailed or comprehensive record of your care. You should review this with your cancer provider. General Information   Patient: Bakari Valle   Patient : 1960   Health Care Providers (Including Names, Institution)   Primary Care Provider: Henrietta Orlando MD   Surgeon:  Paulette Miles MD     Radiation Oncologist:  Erica Mcelroy.  Donavan Jacinto MD, [facility] Radiation Oncology     Medical Oncologist:  Jordy Duffy MD     [employer] at [facility]     Other Providers:  Sheba Ho MD     [facility]     Treatment Summary   Diagnosis   Cancer Type/Location/Histology Subtype: Rectal Cancer  Adenocarcinoma  Diagnosis Date (year):       Stage: IIIA  (T2, N1, M0)     Predisposing Conditions: None       Family History of Colon or Rectal Cancer: None     Received Genetic counseling:No               Genetic testing No                                     Pre-Op Colonoscopy:  Yes     Completion to cecum: Yes        Other Lesions on Pre-Op Colonoscopy: None        Treatment Completed   Surgery: Yes Surgery Date(s) (year):    Surgical procedure/location/findings:  18 Hand-assisted laparoscopic low anterior resection, mobilization of the splenic flexure, creation of colonic J-pouch, coloanal anastomosis, and creation of loop ileostomy  19   Ileostomy closure with resection and anastomosis    Radiation: Yes Body area treated:  Pelvis End Date (year):      Systemic Therapy (chemotherapy, hormonal therapy, other): Yes     Names of Agents Used End Dates (year)   [x] 5-Fluorouracil 2019   [x] Leucovorin 2019   [x] Oxaliplatin 2019   [x] Capecitabine 2019   Persistent symptoms or side effects at completion of treatment: neuropathy, pelvic floor and rectal muscle weakness     Permanent Ostomy: No     Treatment Ongoing    Need for ongoing (adjuvant) treatment for cancer   No due to overwhelming side effects       Follow-up Care Plan   Schedule of Clinical Visits   Coordinating Provider When/How often   Dr. Sophia Alonso  6/28/19 and as directed by her   Dr. Kim Quiles As directed by her   Dr. Lenka Colby As directed by him   Cancer Surveillance or other Recommended Tests      Recommended surveillance after colon cancer resection:  · H&P every 3 months for 2 years, then every 6 months until 5 years  · CEA at each visit  · Colonoscopy at one year, and then as indicated  · CT C/A/P every 6-12 months for 3-5 years     Please continue to see your primary care provider for all general health care recommended for a (man) (woman) your age, including cancer screening tests, except for colon cancer. Any symptoms should be brought to the attention of your provider:   1. Anything that represents a brand new symptom;  2. Anything that represents a persistent symptom;  3. Anything you are worried about that might be related to the cancer coming back. Possible late- and long-term effects that someone with this type of cancer and treatment may experience: Bowel problems  Numbness/tingling  Other:     Cancer survivors may experience issues with the areas listed below. If you have any concerns in these or other areas, please speak with your doctors or nurses to find out how you can get help with them.     Anxiety or depression   Insurance    Sexual Functioning  Emotional and mental health  Memory or concentration loss Stopping Smoking   Fatigue    Parenting    Weight changes   Fertility    Physical functioning   Other   Financial advice or assistance  School/work         A number of lifestyle/behaviors can affect your ongoing health, including the risk for the cancer coming back or developing another cancer. Discuss these recommendations with your doctor or nurse:    Alcohol use    Physical activity               Other  Diet     Sun screen use      Management of my medications  Tobacco use/cessation       Management of my other illnesses  Weight management (loss/gain)   Resources you may be interested in:    www.cancer. net   Other:  Cancercare. org     Other comments:     Prepared by:   Samantha Verma RN, OCN                                                                               Delivered on: 6/25/19

## 2019-06-25 NOTE — PROGRESS NOTES
Left message introducing self, role and program. Explained I would be mailing SCP to her home. Requested a call back once patient received their SCP to review and talk about possible resources. Encouraged them to call if I could provide assistance before that time.

## 2019-06-28 ENCOUNTER — OFFICE VISIT (OUTPATIENT)
Dept: ONCOLOGY | Age: 59
End: 2019-06-28

## 2019-06-28 VITALS
HEART RATE: 79 BPM | BODY MASS INDEX: 31.18 KG/M2 | OXYGEN SATURATION: 97 % | HEIGHT: 66 IN | WEIGHT: 194 LBS | SYSTOLIC BLOOD PRESSURE: 141 MMHG | TEMPERATURE: 99.4 F | DIASTOLIC BLOOD PRESSURE: 82 MMHG

## 2019-06-28 DIAGNOSIS — C77.5 RECTAL CANCER METASTASIZED TO INTRAPELVIC LYMPH NODE (HCC): Primary | ICD-10-CM

## 2019-06-28 DIAGNOSIS — C20 RECTAL CANCER METASTASIZED TO INTRAPELVIC LYMPH NODE (HCC): Primary | ICD-10-CM

## 2019-06-28 NOTE — PROGRESS NOTES
Ren Fernandez is a 63 y.o. female  Chief Complaint   Patient presents with    Follow-up     rectal cancer      1. Have you been to the ER, urgent care clinic since your last visit? Hospitalized since your last visit? 2. Have you seen or consulted any other health care providers outside of the [address] [place] [address] since your last visit?

## 2019-06-28 NOTE — PROGRESS NOTES
Cancer [place] at [place] [address] [address], [place]: [phone]  F: [phone]    Reason for Visit:   Prabha Palmer is a 63 y.o. female who is seen in follow up for Rectal cancer. Treatment History:   · 6/29/18-colonoscopy was notable for a bleeding mass of malignant appearance in the mid rectum and distal rectum at a distance between 4 cm to 11 cm from the anus. Caused partial obstruction. · 7/6/18-CT chest abdomen pelvis showed rectal thickening, some perirectal stranding and several subcentimeter perirectal lymph nodes  · 7/11/18: Rectal ultrasound showed a partially circumferential mass seen in the distal rectum extending through the muscularis propria, 2 small lymph nodes about 6 mm in size adjacent to the mass. · 8/7/18~ 9/20/18-  Xeloda with Radiation  · 12/31/18: Hand-assisted laparoscopic low anterior resection- 0/17 nodes, no residual cancer and negative margins  · 2/6/19: adjuvant FOLFOX x 1 cycle  · 3/1/19: adjuvant CAPOX  · 6/19/19: CT with NIKOLE     History of Present Illness:   Patient is a 63 y.o. female who had not had a screening colonoscopy developed constipation and subsequently BRBPR x 5-6 months. She wondered if this was secondary to L thyroxine. However symptoms continued to progress and she was referred to GI for further evaluation. Further investigations as above. Completed neoadjuvant chemoradiation and comes after LAR on 12/3/1/18 by Dr. Hollie Pena. She received one cycle of adjuvant FOLFOX and chose not to continue due to grade 2 nausea, grade 1 neuropathy, and concerns over QOL with CADD pump. She declined therapy with single agent Xeloda. Comes in today for follow-up. Had scans. She had ostomy reversal on April 30th. Continues to recover and improve every day. Her internal sphincter muscle is still weak per patient and she goes to PT to strength this. Surgical site is healing well. Numbness/tingling has resolved.  Hair is still thin from chemo. She has 1 adopted daughter and supportive    Quit smoking in   Rare ETOH    No FH of cancer. Past Medical History:   Diagnosis Date    Asthma     SEASONAL ALLERGY INDUCED    GERD (gastroesophageal reflux disease)     Nausea & vomiting     Rectal cancer ([address] 2018    Diagnosed via colonoscopy on 2018. Staged as EA5A4T2. Treated with neoadjuvant chemoradiation using Xeloda and 5040 cGy radiation and with the final radiation dose administered on 2018. Resected on 2018. Treated with adjuvant chemotherapy that was not well tolerated and that was discontinued on 3/22/2019.  Thyroid disease     hyperthyroidism      Past Surgical History:   Procedure Laterality Date    HX COLONOSCOPY  2018    Dr. Diandra Restrepo.  HX GI      Laparoscopic cholecystectomy.  HX GI  2018    Hand-assisted laparoscopic low anterior resection, mobilization of the splenic flexure, creation of colonic J-pouch, coloanal anastomosis, and creation of loop ileostomy; Dr. Darryl Harley.  HX HEENT      Sinus surgery.  HX ORTHOPAEDIC Right 1998    Right carpal tunnel release.  HX ORTHOPAEDIC  1982    Right ulnar nerve repositioning.     HX UROLOGICAL  2018    Cystoscopy and placement of temporary bilateral ureteral catheters; Dixie Townsend MD.   Malcolm Louie HX VASCULAR ACCESS      javier cath    HX VASCULAR ACCESS      removal of port    IR INSERT TUNL CVC W PORT OVER 5 YEARS  2019    IR REMOVE TUNL CVAD W PORT/PUMP  2019      Social History     Tobacco Use    Smoking status: Former Smoker     Packs/day: 0.50     Years: 10.00     Pack years: 5.00     Last attempt to quit: 12     Years since quittin.5    Smokeless tobacco: Never Used   Substance Use Topics    Alcohol use: No      Family History   Problem Relation Age of Onset    Heart Disease Mother     Cancer Mother         CERVICAL    Hypertension Mother     Heart Disease Father  Hypertension Father     Heart Disease Brother     Anesth Problems Other      Current Outpatient Medications   Medication Sig    pantoprazole (PROTONIX) 20 mg tablet Take 1 Tab by mouth daily.  methIMAzole (TAPAZOLE) 5 mg tablet Take 5 mg by mouth nightly.  terconazole (TERAZOL 7) 0.4 % vaginal cream INSERT 1 APPLICATORFUL IN VAGINA EVERY EVENING FOR 7 DAYS IF NEEDED FOR YEAST INFECTION    loratadine (CLARITIN) 10 mg tablet Take 10 mg by mouth daily as needed. No current facility-administered medications for this visit. No Known Allergies     Review of Systems: A complete review of systems was obtained, negative except as described above. Physical Exam:     Visit Vitals  /82 (BP 1 Location: Left arm, BP Patient Position: Sitting)   Pulse 79   Temp 99.4 °F (37.4 °C) (Oral)   Ht 5' 6\" (1.676 m)   Wt 194 lb (88 kg)   SpO2 97%   BMI 31.31 kg/m²     ECOG PS: 0  General: No distress  Eyes: PERRL, anicteric sclerae  HENT: Atraumatic, OP clear  Neck: Supple  Lymphatic: No cervical, supraclavicular adenopathy  Respiratory: CTAB, normal respiratory effort  CV: Normal rate, regular rhythm, no murmurs, no peripheral edema  GI: Soft, nontender, nondistended, no masses, no hepatomegaly, no splenomegaly, Stoma noted  MS: Normal gait and station. Digits without clubbing or cyanosis. Skin: No rashes, ecchymoses, or petechiae. Normal temperature, turgor, and texture; PAC site looks good  Psych: Alert, oriented, appropriate affect, normal judgment/insight    Results:     Lab Results   Component Value Date/Time    WBC 5.2 06/19/2019 02:11 PM    HGB 12.6 06/19/2019 02:11 PM    HCT 41.5 06/19/2019 02:11 PM    PLATELET 797 84/82/8127 02:11 PM    MCV 85 06/19/2019 02:11 PM    ABS.  NEUTROPHILS 3.7 06/19/2019 02:11 PM     Lab Results   Component Value Date/Time    Sodium 139 06/19/2019 02:11 PM    Potassium 4.5 06/19/2019 02:11 PM    Chloride 101 06/19/2019 02:11 PM    CO2 20 06/19/2019 02:11 PM    Glucose 86 06/19/2019 02:11 PM    BUN 10 06/19/2019 02:11 PM    Creatinine 0.73 06/19/2019 02:11 PM    GFR est  06/19/2019 02:11 PM    GFR est non-AA 91 06/19/2019 02:11 PM    Calcium 9.4 06/19/2019 02:11 PM     Lab Results   Component Value Date/Time    Bilirubin, total 0.3 06/19/2019 02:11 PM    ALT (SGPT) 20 06/19/2019 02:11 PM    AST (SGOT) 20 06/19/2019 02:11 PM    Alk. phosphatase 90 06/19/2019 02:11 PM    Protein, total 7.4 06/19/2019 02:11 PM    Albumin 4.4 06/19/2019 02:11 PM    Globulin 3.6 04/15/2019 09:53 AM     Records reviewed and summarized above. Pathology report(s) reviewed above. Radiology report(s) reviewed above. CT 6/19/19:  IMPRESSION:  No evidence for metastatic disease. 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 She poorly tolerated cycle 1 with grade 2 nausea and vomiting and grade 1 neuropathy. Also CADD pump affected her QOL and she wishes to pursue a chemo treatment that does not involve a CADD. Decided to transition to CAPOX for 4 total cycles. She developed a grade 3 hypersensitivity reaction to Oxaliplatin  Symptoms lasted 2 weeks    She has declined further chemotherapy including single agent Xeloda    Repeat CT on 6/19 shows NIKOLE. Will continue surveillance and repeat imaging in 6 months. Labs unremarkable. CEA WNL. Repeat colonoscopy due December 2019 which she is aware    2) Hypothyroidism  Controlled    3) Neuropathy  Resolved     Plan:     · Continue surveillance  · CBC with diff, CMP, CEA every 3 months  · Repeat imaging in 6 months to be ordered at next visit   · MSI testing added today 6/28/19    RTC in 3 months with labs    I appreciate the opportunity to participate in Ms. Marsha Galeano's care.     Signed By: Naveen Jacinto Luz Maria Khan MD

## 2019-09-13 DIAGNOSIS — C20 RECTAL CANCER METASTASIZED TO INTRAPELVIC LYMPH NODE (HCC): Primary | ICD-10-CM

## 2019-09-13 DIAGNOSIS — C77.5 RECTAL CANCER METASTASIZED TO INTRAPELVIC LYMPH NODE (HCC): Primary | ICD-10-CM

## 2019-09-16 DIAGNOSIS — C20 RECTAL CANCER METASTASIZED TO INTRAPELVIC LYMPH NODE (HCC): Primary | ICD-10-CM

## 2019-09-16 DIAGNOSIS — C77.5 RECTAL CANCER METASTASIZED TO INTRAPELVIC LYMPH NODE (HCC): Primary | ICD-10-CM

## 2019-09-20 LAB
ALBUMIN SERPL-MCNC: 4.4 G/DL (ref 3.5–5.5)
ALBUMIN/GLOB SERPL: 1.6 {RATIO} (ref 1.2–2.2)
ALP SERPL-CCNC: 103 IU/L (ref 39–117)
ALT SERPL-CCNC: 18 IU/L (ref 0–32)
AST SERPL-CCNC: 17 IU/L (ref 0–40)
BASOPHILS # BLD AUTO: 0 X10E3/UL (ref 0–0.2)
BASOPHILS NFR BLD AUTO: 0 %
BILIRUB SERPL-MCNC: <0.2 MG/DL (ref 0–1.2)
BUN SERPL-MCNC: 12 MG/DL (ref 6–24)
BUN/CREAT SERPL: 14 (ref 9–23)
CALCIUM SERPL-MCNC: 9.4 MG/DL (ref 8.7–10.2)
CEA SERPL-MCNC: 0.7 NG/ML (ref 0–4.7)
CHLORIDE SERPL-SCNC: 103 MMOL/L (ref 96–106)
CO2 SERPL-SCNC: 21 MMOL/L (ref 20–29)
CREAT SERPL-MCNC: 0.86 MG/DL (ref 0.57–1)
EOSINOPHIL # BLD AUTO: 0.2 X10E3/UL (ref 0–0.4)
EOSINOPHIL NFR BLD AUTO: 3 %
ERYTHROCYTE [DISTWIDTH] IN BLOOD BY AUTOMATED COUNT: 15.6 % (ref 12.3–15.4)
GLOBULIN SER CALC-MCNC: 2.8 G/DL (ref 1.5–4.5)
GLUCOSE SERPL-MCNC: 97 MG/DL (ref 65–99)
HCT VFR BLD AUTO: 41.7 % (ref 34–46.6)
HGB BLD-MCNC: 13.1 G/DL (ref 11.1–15.9)
IMM GRANULOCYTES # BLD AUTO: 0 X10E3/UL (ref 0–0.1)
IMM GRANULOCYTES NFR BLD AUTO: 0 %
LYMPHOCYTES # BLD AUTO: 1.1 X10E3/UL (ref 0.7–3.1)
LYMPHOCYTES NFR BLD AUTO: 19 %
MCH RBC QN AUTO: 25.4 PG (ref 26.6–33)
MCHC RBC AUTO-ENTMCNC: 31.4 G/DL (ref 31.5–35.7)
MCV RBC AUTO: 81 FL (ref 79–97)
MONOCYTES # BLD AUTO: 0.4 X10E3/UL (ref 0.1–0.9)
MONOCYTES NFR BLD AUTO: 7 %
NEUTROPHILS # BLD AUTO: 4 X10E3/UL (ref 1.4–7)
NEUTROPHILS NFR BLD AUTO: 71 %
PLATELET # BLD AUTO: 240 X10E3/UL (ref 150–450)
POTASSIUM SERPL-SCNC: 4 MMOL/L (ref 3.5–5.2)
PROT SERPL-MCNC: 7.2 G/DL (ref 6–8.5)
RBC # BLD AUTO: 5.16 X10E6/UL (ref 3.77–5.28)
SODIUM SERPL-SCNC: 143 MMOL/L (ref 134–144)
WBC # BLD AUTO: 5.7 X10E3/UL (ref 3.4–10.8)

## 2019-10-04 ENCOUNTER — OFFICE VISIT (OUTPATIENT)
Dept: ONCOLOGY | Age: 59
End: 2019-10-04

## 2019-10-04 ENCOUNTER — DOCUMENTATION ONLY (OUTPATIENT)
Dept: ONCOLOGY | Age: 59
End: 2019-10-04

## 2019-10-04 VITALS
OXYGEN SATURATION: 97 % | WEIGHT: 206.8 LBS | DIASTOLIC BLOOD PRESSURE: 85 MMHG | TEMPERATURE: 97.5 F | BODY MASS INDEX: 33.23 KG/M2 | SYSTOLIC BLOOD PRESSURE: 129 MMHG | HEART RATE: 81 BPM | HEIGHT: 66 IN

## 2019-10-04 DIAGNOSIS — C77.5 RECTAL CANCER METASTASIZED TO INTRAPELVIC LYMPH NODE (HCC): Primary | ICD-10-CM

## 2019-10-04 DIAGNOSIS — C20 RECTAL CANCER METASTASIZED TO INTRAPELVIC LYMPH NODE (HCC): Primary | ICD-10-CM

## 2019-10-04 DIAGNOSIS — E66.9 OBESITY (BMI 30.0-34.9): ICD-10-CM

## 2019-10-04 NOTE — PROGRESS NOTES
[facility] [place] [facility] [place] [address], [address], [place]: [phone]  F: [phone]    Reason for Visit:   Isatu Gilbert is a 63 y.o. female who is seen in follow up for Rectal cancer. Treatment History:   · 6/29/18-colonoscopy was notable for a bleeding mass of malignant appearance in the mid rectum and distal rectum at a distance between 4 cm to 11 cm from the anus. Caused partial obstruction. · 7/6/18-CT chest abdomen pelvis showed rectal thickening, some perirectal stranding and several subcentimeter perirectal lymph nodes  · 7/11/18: Rectal ultrasound showed a partially circumferential mass seen in the distal rectum extending through the muscularis propria, 2 small lymph nodes about 6 mm in size adjacent to the mass. · 8/7/18~ 9/20/18-  Xeloda with Radiation  · 12/31/18: Hand-assisted laparoscopic low anterior resection- 0/17 nodes, no residual cancer and negative margins  · 2/6/19: adjuvant FOLFOX x 1 cycle  · 3/1/19: adjuvant CAPOX  · 6/19/19: CT with NIKOLE     History of Present Illness:   Patient is a 63 y.o. female who had not had a screening colonoscopy developed constipation and subsequently BRBPR x 5-6 months. She wondered if this was secondary to L thyroxine. However symptoms continued to progress and she was referred to GI for further evaluation. Further investigations as above. Completed neoadjuvant chemoradiation and comes after LAR on 12/3/1/18 by Dr. Zev Garcia. She received one cycle of adjuvant FOLFOX and chose not to continue due to grade 2 nausea, grade 1 neuropathy, and concerns over QOL with CADD pump. She declined therapy with single agent Xeloda. Comes in today for follow-up. Had labs. She had ostomy reversal on April 30th. Continues to recover and improve every day. Her internal sphincter muscle is still weak per patient and she goes to PT to strength this.  Surgical site is healing well but she believes she has developed a hernia at surgical site, she has appointment with Dr. Patrice Alonzo this afternoon. Numbness/tingling comes and goes in her feet. She is meeting with dietician today as fruits/veggies \"go right through her. \"    She has 1 adopted daughter and supportive    Quit smoking in   Rare ETOH    No FH of cancer. Past Medical History:   Diagnosis Date    Asthma     SEASONAL ALLERGY INDUCED    GERD (gastroesophageal reflux disease)     Nausea & vomiting     Rectal cancer ([address] 2018    Diagnosed via colonoscopy on 2018. Staged as HC2D3D4. Treated with neoadjuvant chemoradiation using Xeloda and 5040 cGy radiation and with the final radiation dose administered on 2018. Resected on 2018. Treated with adjuvant chemotherapy that was not well tolerated and that was discontinued on 3/22/2019.  Thyroid disease     hyperthyroidism      Past Surgical History:   Procedure Laterality Date    HX COLONOSCOPY  2018    Dr. Pacheco Jo.  HX GI      Laparoscopic cholecystectomy.  HX GI  2018    Hand-assisted laparoscopic low anterior resection, mobilization of the splenic flexure, creation of colonic J-pouch, coloanal anastomosis, and creation of loop ileostomy; Dr. Piedad Mejia.  HX HEENT      Sinus surgery.  HX ORTHOPAEDIC Right 1998    Right carpal tunnel release.  HX ORTHOPAEDIC  1982    Right ulnar nerve repositioning.     HX UROLOGICAL  2018    Cystoscopy and placement of temporary bilateral ureteral catheters; Carolina Gaines MD.   Shiloh Dupree HX VASCULAR ACCESS      javier cath    HX VASCULAR ACCESS      removal of port    IR INSERT TUNL CVC W PORT OVER 5 YEARS  2019    IR REMOVE TUNL CVAD W PORT/PUMP  2019      Social History     Tobacco Use    Smoking status: Former Smoker     Packs/day: 0.50     Years: 10.00     Pack years: 5.00     Last attempt to quit:      Years since quittin.7    Smokeless tobacco: Never Used   Substance Use Topics    Alcohol use: No      Family History   Problem Relation Age of Onset    Heart Disease Mother     Cancer Mother         CERVICAL    Hypertension Mother     Heart Disease Father     Hypertension Father     Heart Disease Brother     Anesth Problems Other      Current Outpatient Medications   Medication Sig    CLARITIN-D 24 HOUR  mg per tablet TAKE 1 TABLET BY MOUTH EVERY DAY    methIMAzole (TAPAZOLE) 5 mg tablet Take 5 mg by mouth nightly.  pantoprazole (PROTONIX) 20 mg tablet Take 1 Tab by mouth daily.  terconazole (TERAZOL 7) 0.4 % vaginal cream INSERT 1 APPLICATORFUL IN VAGINA EVERY EVENING FOR 7 DAYS IF NEEDED FOR YEAST INFECTION     No current facility-administered medications for this visit. No Known Allergies     Review of Systems: A complete review of systems was obtained, negative except as described above. Physical Exam:     Visit Vitals  /85 (BP 1 Location: Left arm, BP Patient Position: Sitting)   Pulse 81   Temp 97.5 °F (36.4 °C) (Oral)   Ht 5' 6\" (1.676 m)   Wt 206 lb 12.8 oz (93.8 kg)   SpO2 97%   BMI 33.38 kg/m²     ECOG PS: 0  General: No distress  Eyes: PERRL, anicteric sclerae  HENT: Atraumatic, OP clear  Neck: Supple  Lymphatic: No cervical, supraclavicular adenopathy  Respiratory: CTAB, normal respiratory effort  CV: Normal rate, regular rhythm, no murmurs, no peripheral edema  GI: Soft, nontender, nondistended, no masses, no hepatomegaly, no splenomegaly, Stoma noted  MS: Normal gait and station. Digits without clubbing or cyanosis. Skin: No rashes, ecchymoses, or petechiae. Normal temperature, turgor, and texture  Psych: Alert, oriented, appropriate affect, normal judgment/insight    Results:     Lab Results   Component Value Date/Time    WBC 5.7 09/19/2019 02:02 PM    HGB 13.1 09/19/2019 02:02 PM    HCT 41.7 09/19/2019 02:02 PM    PLATELET 911 73/09/2105 02:02 PM    MCV 81 09/19/2019 02:02 PM    ABS.  NEUTROPHILS 4.0 09/19/2019 02:02 PM     Lab Results   Component Value Date/Time    Sodium 143 09/19/2019 02:02 PM    Potassium 4.0 09/19/2019 02:02 PM    Chloride 103 09/19/2019 02:02 PM    CO2 21 09/19/2019 02:02 PM    Glucose 97 09/19/2019 02:02 PM    BUN 12 09/19/2019 02:02 PM    Creatinine 0.86 09/19/2019 02:02 PM    GFR est AA 86 09/19/2019 02:02 PM    GFR est non-AA 75 09/19/2019 02:02 PM    Calcium 9.4 09/19/2019 02:02 PM     Lab Results   Component Value Date/Time    Bilirubin, total <0.2 09/19/2019 02:02 PM    ALT (SGPT) 18 09/19/2019 02:02 PM    AST (SGOT) 17 09/19/2019 02:02 PM    Alk. phosphatase 103 09/19/2019 02:02 PM    Protein, total 7.2 09/19/2019 02:02 PM    Albumin 4.4 09/19/2019 02:02 PM    Globulin 3.6 04/15/2019 09:53 AM     Records reviewed and summarized above. Pathology report(s) reviewed above. Radiology report(s) reviewed above. CT 6/19/19:  IMPRESSION:  No evidence for metastatic disease. 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 She poorly tolerated cycle 1 with grade 2 nausea and vomiting and grade 1 neuropathy. Also CADD pump affected her QOL and she wishes to pursue a chemo treatment that does not involve a CADD. Decided to transition to CAPOX for 4 total cycles. She developed a grade 3 hypersensitivity reaction to Oxaliplatin  Symptoms lasted 2 weeks    She has declined further chemotherapy including single agent Xeloda    Repeat CT on 6/19 shows NIKOLE. Will continue surveillance and repeat imaging in 3 months. Scheduled 12/3/19. Labs unremarkable. CEA WNL.      Repeat colonoscopy due December 2019 which she is aware    2) Hypothyroidism  Controlled    3) Neuropathy  Resolved     Plan:     · Continue surveillance  · CBC with diff, CMP, CEA every 3 months  · Repeat imaging in 3 months, scheduled 12/3/19    RTC in 3 months with labs & scans     I appreciate the opportunity to participate in Ms. Clifford Walker's care.     Signed By: Austin Lazo MD

## 2019-10-04 NOTE — PROGRESS NOTES
Heather Hallman is a 63 y.o. female  Chief Complaint   Patient presents with    Follow-up     Rectal Cancer     1. Have you been to the ER, urgent care clinic since your last visit? Hospitalized since your last visit? No.    2. Have you seen or consulted any other health care providers outside of the [address] [place] [address] since your last visit? Include any pap smears or colon screening.  Yes, \"might have seen my OBGYN and had a mammogram.\"

## 2019-10-10 NOTE — PROGRESS NOTES
[facility] [place] [facility], [address], [address]   W: [phone]  F: [phone] Nutrition Therapy      Reason for nutrition visit: Supportive visit with patient to introduce self and discussed role of oncology dietitian in providing supportive nutrition care. Rectal cancer s/p hand-assisted lap low anterior resection, coloanal anastomosis and creation of loop ileostomy on 12/13/18. On 4/30/19 s/p ileostomy closure with resection and anastomosis. Since then she has been working with the pelvic floor clinic to strengthen her sphincter. She continues to struggle with fruits and vegetables and finds carbohydrates tend to work best for her. As a result she has been eating more carbs and has gained weight. She would like to lose weight and eat healthier. She does ok with canned green beans and asparagus. She can do zucchini is small doses. She tolerated a garden salad with misa lettuce, tomatoes, cucumbers. She does well with watermelon, cantaloupe, small amounts of apple/pineapple, 1/2 banana daily. Sometimes food will go straight through her and she will be in the bathroom all afternoon. Diet recall:  Breakfast: English muffin w/ pb and half banana   Snack: small amount of pineapple  Lunch: Chicken, mashed potatoes, corn  Bowel movement after lunch  Dinner: Grilled wings   Bowel movement after dinner.        Wt Readings from Last 5 Encounters:   10/04/19 206 lb 12.8 oz (93.8 kg)   06/28/19 194 lb (88 kg)   04/30/19 193 lb (87.5 kg)   04/15/19 193 lb (87.5 kg)   04/01/19 189 lb (85.7 kg)       Estimated Nutrition Needs:   Calorie Range: 1674-1860kcal/day      Protein Range: 60-70g/day     Fluid Needs: 1800ml      Assessment:   Altered GI Function related to surgery as evidence by ongoing intolerance with certain fruits and vegetables     Plan:   - Eat a fruit and vegetable with a protein and fat   - For example, avocado, cheese, cottage cheese, yogurt, nuts, peanut butter, hard-boiled eggs, hummus.   - Peel apple, cucumbers/zucchini if having a large portion (for example, you may be able to tolerate cucumber on a salad but if you just dip cucumbers in hummus you may need to peel the skin). - Add protein/fat to salads.  - Take small sips with meals. I appreciate the opportunity to participate in Ms. Carol Galeano's care.     Signed By: Kane Perez, [address], [address] [place] [address] , [address]     Contact: [phone]

## 2019-12-03 ENCOUNTER — HOSPITAL ENCOUNTER (OUTPATIENT)
Dept: CT IMAGING | Age: 59
Discharge: HOME | End: 2019-12-03
Attending: REGISTERED NURSE
Payer: COMMERCIAL

## 2019-12-03 DIAGNOSIS — C20 RECTAL CANCER METASTASIZED TO INTRAPELVIC LYMPH NODE (HCC): ICD-10-CM

## 2019-12-03 DIAGNOSIS — C77.5 RECTAL CANCER METASTASIZED TO INTRAPELVIC LYMPH NODE (HCC): ICD-10-CM

## 2019-12-03 PROCEDURE — 71260 CT THORAX DX C+: CPT

## 2019-12-03 PROCEDURE — 74177 CT ABD & PELVIS W/CONTRAST: CPT

## 2019-12-03 PROCEDURE — 74011000258 HC RX REV CODE- 258: Performed by: RADIOLOGY

## 2019-12-03 PROCEDURE — 74011636320 HC RX REV CODE- 636/320: Performed by: RADIOLOGY

## 2019-12-03 RX ORDER — SODIUM CHLORIDE 0.9 % (FLUSH) 0.9 %
10 SYRINGE (ML) INJECTION
Status: COMPLETED | OUTPATIENT
Start: 2019-12-03 | End: 2019-12-03

## 2019-12-03 RX ADMIN — IOPAMIDOL 100 ML: 755 INJECTION, SOLUTION INTRAVENOUS at 16:04

## 2019-12-03 RX ADMIN — SODIUM CHLORIDE 100 ML: 900 INJECTION, SOLUTION INTRAVENOUS at 16:04

## 2019-12-03 RX ADMIN — Medication 10 ML: at 16:04

## 2019-12-04 LAB
ALBUMIN SERPL-MCNC: 4.4 G/DL (ref 3.5–5.5)
ALBUMIN/GLOB SERPL: 1.8 {RATIO} (ref 1.2–2.2)
ALP SERPL-CCNC: 97 IU/L (ref 39–117)
ALT SERPL-CCNC: 20 IU/L (ref 0–32)
AST SERPL-CCNC: 15 IU/L (ref 0–40)
BASOPHILS # BLD AUTO: 0 X10E3/UL (ref 0–0.2)
BASOPHILS NFR BLD AUTO: 0 %
BILIRUB SERPL-MCNC: 0.2 MG/DL (ref 0–1.2)
BUN SERPL-MCNC: 12 MG/DL (ref 6–24)
BUN/CREAT SERPL: 16 (ref 9–23)
CALCIUM SERPL-MCNC: 9 MG/DL (ref 8.7–10.2)
CEA SERPL-MCNC: 0.7 NG/ML (ref 0–4.7)
CHLORIDE SERPL-SCNC: 102 MMOL/L (ref 96–106)
CO2 SERPL-SCNC: 20 MMOL/L (ref 20–29)
CREAT SERPL-MCNC: 0.75 MG/DL (ref 0.57–1)
EOSINOPHIL # BLD AUTO: 0.1 X10E3/UL (ref 0–0.4)
EOSINOPHIL NFR BLD AUTO: 2 %
ERYTHROCYTE [DISTWIDTH] IN BLOOD BY AUTOMATED COUNT: 14.3 % (ref 12.3–15.4)
GLOBULIN SER CALC-MCNC: 2.4 G/DL (ref 1.5–4.5)
GLUCOSE SERPL-MCNC: 93 MG/DL (ref 65–99)
HCT VFR BLD AUTO: 39.3 % (ref 34–46.6)
HGB BLD-MCNC: 12.8 G/DL (ref 11.1–15.9)
IMM GRANULOCYTES # BLD AUTO: 0 X10E3/UL (ref 0–0.1)
IMM GRANULOCYTES NFR BLD AUTO: 0 %
LYMPHOCYTES # BLD AUTO: 1 X10E3/UL (ref 0.7–3.1)
LYMPHOCYTES NFR BLD AUTO: 19 %
MCH RBC QN AUTO: 26.1 PG (ref 26.6–33)
MCHC RBC AUTO-ENTMCNC: 32.6 G/DL (ref 31.5–35.7)
MCV RBC AUTO: 80 FL (ref 79–97)
MONOCYTES # BLD AUTO: 0.3 X10E3/UL (ref 0.1–0.9)
MONOCYTES NFR BLD AUTO: 6 %
NEUTROPHILS # BLD AUTO: 3.9 X10E3/UL (ref 1.4–7)
NEUTROPHILS NFR BLD AUTO: 73 %
PLATELET # BLD AUTO: 227 X10E3/UL (ref 150–450)
POTASSIUM SERPL-SCNC: 4.2 MMOL/L (ref 3.5–5.2)
PROT SERPL-MCNC: 6.8 G/DL (ref 6–8.5)
RBC # BLD AUTO: 4.91 X10E6/UL (ref 3.77–5.28)
SODIUM SERPL-SCNC: 137 MMOL/L (ref 134–144)
WBC # BLD AUTO: 5.3 X10E3/UL (ref 3.4–10.8)

## 2019-12-06 ENCOUNTER — OFFICE VISIT (OUTPATIENT)
Dept: ONCOLOGY | Age: 59
End: 2019-12-06

## 2019-12-06 VITALS
WEIGHT: 212.8 LBS | HEART RATE: 93 BPM | OXYGEN SATURATION: 96 % | TEMPERATURE: 98.3 F | DIASTOLIC BLOOD PRESSURE: 87 MMHG | SYSTOLIC BLOOD PRESSURE: 143 MMHG | HEIGHT: 66 IN | BODY MASS INDEX: 34.2 KG/M2

## 2019-12-06 DIAGNOSIS — C77.5 RECTAL CANCER METASTASIZED TO INTRAPELVIC LYMPH NODE (HCC): Primary | ICD-10-CM

## 2019-12-06 DIAGNOSIS — E66.9 OBESITY (BMI 30.0-34.9): ICD-10-CM

## 2019-12-06 DIAGNOSIS — C20 RECTAL CANCER METASTASIZED TO INTRAPELVIC LYMPH NODE (HCC): Primary | ICD-10-CM

## 2019-12-06 NOTE — PROGRESS NOTES
Aracely Flynn is a 63 y.o. female  Chief Complaint   Patient presents with    Follow-up     rectal cancer     1. Have you been to the ER, urgent care clinic since your last visit? Hospitalized since your last visit? No  2. Have you seen or consulted any other health care providers outside of the [address] [place] [address] since your last visit? Include any papNo smears or colon screening.  No

## 2019-12-06 NOTE — PROGRESS NOTES
[facility] [place] at [place] [address] [address], [address], [place]: [phone]  F: [phone]    Reason for Visit:   Sariah Arredondo is a 63 y.o. female who is seen in follow up for Rectal cancer. Treatment History:   · 6/29/18-colonoscopy was notable for a bleeding mass of malignant appearance in the mid rectum and distal rectum at a distance between 4 cm to 11 cm from the anus. Caused partial obstruction. · 7/6/18-CT chest abdomen pelvis showed rectal thickening, some perirectal stranding and several subcentimeter perirectal lymph nodes  · 7/11/18: Rectal ultrasound showed a partially circumferential mass seen in the distal rectum extending through the muscularis propria, 2 small lymph nodes about 6 mm in size adjacent to the mass. · 8/7/18~ 9/20/18-  Xeloda with Radiation  · 12/31/18: Hand-assisted laparoscopic low anterior resection- 0/17 nodes, no residual cancer and negative margins  · 2/6/19: adjuvant FOLFOX x 1 cycle  · 3/1/19: adjuvant CAPOX  · 6/19/19: CT with NIKOLE   · 12/3/19: CT with NIKOLE     History of Present Illness:   Patient is a 63 y.o. female who had not had a screening colonoscopy developed constipation and subsequently BRBPR x 5-6 months. She wondered if this was secondary to L thyroxine. However symptoms continued to progress and she was referred to GI for further evaluation. Further investigations as above. Completed neoadjuvant chemoradiation and comes after LAR on 12/3/1/18 by Dr. Selvin Quezada. She received one cycle of adjuvant FOLFOX and chose not to continue due to grade 2 nausea, grade 1 neuropathy, and concerns over QOL with CADD pump. She declined therapy with single agent Xeloda. Comes in today for follow-up. Had labs and scans. She feels great. Has hernia repair surgery scheduled Tuesday.  She had a colonoscopy yesterday and per patient everything looked \"great\" around surgical site however some areas of colon were difficult to visualize so repeat colonoscopy is scheduled in 6 months with double prep. She has a good appetite, denies diarrhea/constipation or nausea/vomiting. No aches or pain. No unexplained weight loss. No new lumps or bumps. No bleeding. She has 1 adopted daughter and supportive    Quit smoking in   Rare ETOH    No FH of cancer. Past Medical History:   Diagnosis Date    Asthma     SEASONAL ALLERGY INDUCED    GERD (gastroesophageal reflux disease)     Nausea & vomiting     Rectal cancer ([address] 2018    Diagnosed via colonoscopy on 2018. Staged as [id number]. Treated with neoadjuvant chemoradiation using Xeloda and 5040 cGy radiation and with the final radiation dose administered on 2018. Resected on 2018. Treated with adjuvant chemotherapy that was not well tolerated and that was discontinued on 3/22/2019.  Thyroid disease     hyperthyroidism      Past Surgical History:   Procedure Laterality Date    HX COLONOSCOPY  2018    Dr. Ranjan Lopez.  HX GI      Laparoscopic cholecystectomy.  HX GI  2018    Hand-assisted laparoscopic low anterior resection, mobilization of the splenic flexure, creation of colonic J-pouch, coloanal anastomosis, and creation of loop ileostomy; Dr. Lorenzo Mckeon.  HX HEENT      Sinus surgery.  HX ORTHOPAEDIC Right 1998    Right carpal tunnel release.  HX ORTHOPAEDIC  1982    Right ulnar nerve repositioning.     HX UROLOGICAL  2018    Cystoscopy and placement of temporary bilateral ureteral catheters; Genevieve Cooks, MD.   [facility] HX VASCULAR ACCESS      javier cath    HX VASCULAR ACCESS      removal of port    IR INSERT TUNL CVC W PORT OVER 5 YEARS  2019    IR REMOVE TUNL CVAD W PORT/PUMP  2019      Social History     Tobacco Use    Smoking status: Former Smoker     Packs/day: 0.50     Years: 10.00     Pack years: 5.00     Last attempt to quit:      Years since quittin.9    Smokeless tobacco: Never Used Substance Use Topics    Alcohol use: No      Family History   Problem Relation Age of Onset    Heart Disease Mother     Cancer Mother         CERVICAL    Hypertension Mother     Heart Disease Father     Hypertension Father     Heart Disease Brother     Anesth Problems Other      Current Outpatient Medications   Medication Sig    CLARITIN-D 24 HOUR  mg per tablet TAKE 1 TABLET BY MOUTH EVERY DAY    pantoprazole (PROTONIX) 20 mg tablet Take 1 Tab by mouth daily.  terconazole (TERAZOL 7) 0.4 % vaginal cream INSERT 1 APPLICATORFUL IN VAGINA EVERY EVENING FOR 7 DAYS IF NEEDED FOR YEAST INFECTION    methIMAzole (TAPAZOLE) 5 mg tablet Take 5 mg by mouth nightly. No current facility-administered medications for this visit. No Known Allergies     Review of Systems: A complete review of systems was obtained, negative except as described above. Physical Exam:     Visit Vitals  /87 (BP 1 Location: Right arm)   Pulse 93   Temp 98.3 °F (36.8 °C)   Ht 5' 6\" (1.676 m)   Wt 212 lb 12.8 oz (96.5 kg)   SpO2 96%   BMI 34.35 kg/m²     ECOG PS: 0  General: No distress  Eyes: PERRL, anicteric sclerae  HENT: Atraumatic, OP clear  Neck: Supple  Lymphatic: No cervical, supraclavicular adenopathy  Respiratory: CTAB, normal respiratory effort  CV: Normal rate, regular rhythm, no murmurs, no peripheral edema  GI: Soft, nontender, nondistended, no masses, no hepatomegaly, no splenomegaly, Stoma noted  MS: Normal gait and station. Digits without clubbing or cyanosis. Skin: No rashes, ecchymoses, or petechiae. Normal temperature, turgor, and texture  Psych: Alert, oriented, appropriate affect, normal judgment/insight    Results:     Lab Results   Component Value Date/Time    WBC 5.3 12/03/2019 01:49 PM    HGB 12.8 12/03/2019 01:49 PM    HCT 39.3 12/03/2019 01:49 PM    PLATELET 466 77/45/4343 01:49 PM    MCV 80 12/03/2019 01:49 PM    ABS.  NEUTROPHILS 3.9 12/03/2019 01:49 PM     Lab Results   Component Value Date/Time    Sodium 137 12/03/2019 01:49 PM    Potassium 4.2 12/03/2019 01:49 PM    Chloride 102 12/03/2019 01:49 PM    CO2 20 12/03/2019 01:49 PM    Glucose 93 12/03/2019 01:49 PM    BUN 12 12/03/2019 01:49 PM    Creatinine 0.75 12/03/2019 01:49 PM    GFR est  12/03/2019 01:49 PM    GFR est non-AA 88 12/03/2019 01:49 PM    Calcium 9.0 12/03/2019 01:49 PM     Lab Results   Component Value Date/Time    Bilirubin, total 0.2 12/03/2019 01:49 PM    ALT (SGPT) 20 12/03/2019 01:49 PM    AST (SGOT) 15 12/03/2019 01:49 PM    Alk. phosphatase 97 12/03/2019 01:49 PM    Protein, total 6.8 12/03/2019 01:49 PM    Albumin 4.4 12/03/2019 01:49 PM    Globulin 3.6 04/15/2019 09:53 AM     Records reviewed and summarized above. Pathology report(s) reviewed above. Radiology report(s) reviewed above. CT 6/19/19:  IMPRESSION:  No evidence for metastatic disease. CT 12/3/19: IMPRESSION:     1. CT of the chest demonstrates no definite evidence of metastatic disease. 2. CT of the abdomen and pelvis demonstrates hepatic steatosis.     There is no definite evidence of metastatic disease.     There is slight haziness in the small bowel mesentery adjacent to surgical clips  around a small bowel loop most likely postoperative changes.     There are few tiny presacral lymph nodes measuring 5 mm or less in size which  appears stable.     There is a ventral wall hernia to the right of midline containing fat. 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 She poorly tolerated cycle 1 with grade 2 nausea and vomiting and grade 1 neuropathy. Also CADD pump affected her QOL and she wishes to pursue a chemo treatment that does not involve a CADD. Decided to transition to CAPOX for 4 total cycles. She developed a grade 3 hypersensitivity reaction to Oxaliplatin  Symptoms lasted 2 weeks    She has declined further chemotherapy including single agent Xeloda    Repeat CT on 12/3/19 shows NIKOLE. Will continue surveillance and repeat imaging in 6 months and repeat labs in 3 months. Labs unremarkable. CEA WNL. Repeat colonoscopy completed yesterday and what was visualized showed NIKOLE however some areas unable to be visualized. Has repeat colonoscopy in 6 months scheduled with double prep. 2) Hypothyroidism  Controlled    3) Neuropathy  Resolved     Plan:     · Continue surveillance  · CBC with diff, CMP, CEA every 3 months-will call with results   · Repeat imaging in 6 months ordered     RTC in 6 months with scans and labs or sooner if indicated     I appreciate the opportunity to participate in Ms. Lawrence Galeano's care.   Seen in conjunction with Lisa Corbett NP    Signed By: Rene Lockhart MD

## 2019-12-09 ENCOUNTER — ANESTHESIA EVENT (OUTPATIENT)
Dept: SURGERY | Age: 59
End: 2019-12-09
Payer: COMMERCIAL

## 2019-12-09 PROBLEM — K43.2 INCISIONAL HERNIA OF ANTERIOR ABDOMINAL WALL WITHOUT OBSTRUCTION OR GANGRENE: Chronic | Status: ACTIVE | Noted: 2019-12-09

## 2019-12-09 NOTE — PERIOP NOTES
PAT TELEPHONE INTERVIEW COMPLETED WITH PATIENT. INSTRUCTIONS GIVEN ON MEDICATIONS, NPO AFTER MIDNIGHT AND INSTRUCTIONS GIVEN ON THE USE OF CHLORHEXIDINE SOLUTION THE EVENING BEFORE AND THE MORNING OF SURGERY. PATIENT VOICED UNDERSTANDING OF SAME.

## 2019-12-10 ENCOUNTER — ANESTHESIA (OUTPATIENT)
Dept: SURGERY | Age: 59
End: 2019-12-10
Payer: COMMERCIAL

## 2019-12-10 ENCOUNTER — HOSPITAL ENCOUNTER (OUTPATIENT)
Age: 59
Setting detail: OUTPATIENT SURGERY
Discharge: HOME | End: 2019-12-10
Attending: COLON & RECTAL SURGERY | Admitting: COLON & RECTAL SURGERY
Payer: COMMERCIAL

## 2019-12-10 VITALS
SYSTOLIC BLOOD PRESSURE: 110 MMHG | HEIGHT: 67 IN | OXYGEN SATURATION: 98 % | TEMPERATURE: 97.5 F | RESPIRATION RATE: 12 BRPM | BODY MASS INDEX: 33.12 KG/M2 | DIASTOLIC BLOOD PRESSURE: 71 MMHG | WEIGHT: 211 LBS | HEART RATE: 82 BPM

## 2019-12-10 DIAGNOSIS — G89.18 ACUTE POST-OPERATIVE PAIN: Primary | ICD-10-CM

## 2019-12-10 PROCEDURE — 74011250636 HC RX REV CODE- 250/636: Performed by: NURSE ANESTHETIST, CERTIFIED REGISTERED

## 2019-12-10 PROCEDURE — 77030039266 HC ADH SKN EXOFIN S2SG -A: Performed by: COLON & RECTAL SURGERY

## 2019-12-10 PROCEDURE — 77030002996 HC SUT SLK J&J -A: Performed by: COLON & RECTAL SURGERY

## 2019-12-10 PROCEDURE — 77030011640 HC PAD GRND REM COVD -A: Performed by: COLON & RECTAL SURGERY

## 2019-12-10 PROCEDURE — 88302 TISSUE EXAM BY PATHOLOGIST: CPT

## 2019-12-10 PROCEDURE — 77030018846 HC SOL IRR STRL H20 ICUM -A: Performed by: COLON & RECTAL SURGERY

## 2019-12-10 PROCEDURE — 77030040361 HC SLV COMPR DVT MDII -B: Performed by: COLON & RECTAL SURGERY

## 2019-12-10 PROCEDURE — 77030002986 HC SUT PROL J&J -A: Performed by: COLON & RECTAL SURGERY

## 2019-12-10 PROCEDURE — C1781 MESH (IMPLANTABLE): HCPCS | Performed by: COLON & RECTAL SURGERY

## 2019-12-10 PROCEDURE — 76210000016 HC OR PH I REC 1 TO 1.5 HR: Performed by: COLON & RECTAL SURGERY

## 2019-12-10 PROCEDURE — 74011250637 HC RX REV CODE- 250/637: Performed by: ANESTHESIOLOGY

## 2019-12-10 PROCEDURE — 77030026438 HC STYL ET INTUB CARD -A: Performed by: NURSE ANESTHETIST, CERTIFIED REGISTERED

## 2019-12-10 PROCEDURE — 74011250636 HC RX REV CODE- 250/636

## 2019-12-10 PROCEDURE — 77030040922 HC BLNKT HYPOTHRM STRY -A

## 2019-12-10 PROCEDURE — 76210000021 HC REC RM PH II 0.5 TO 1 HR: Performed by: COLON & RECTAL SURGERY

## 2019-12-10 PROCEDURE — 77030002966 HC SUT PDS J&J -A: Performed by: COLON & RECTAL SURGERY

## 2019-12-10 PROCEDURE — 74011250637 HC RX REV CODE- 250/637: Performed by: NURSE ANESTHETIST, CERTIFIED REGISTERED

## 2019-12-10 PROCEDURE — 77030002933 HC SUT MCRYL J&J -A: Performed by: COLON & RECTAL SURGERY

## 2019-12-10 PROCEDURE — 76060000034 HC ANESTHESIA 1.5 TO 2 HR: Performed by: COLON & RECTAL SURGERY

## 2019-12-10 PROCEDURE — 77030018836 HC SOL IRR NACL ICUM -A: Performed by: COLON & RECTAL SURGERY

## 2019-12-10 PROCEDURE — 74011000250 HC RX REV CODE- 250: Performed by: NURSE ANESTHETIST, CERTIFIED REGISTERED

## 2019-12-10 PROCEDURE — 74011250636 HC RX REV CODE- 250/636: Performed by: ANESTHESIOLOGY

## 2019-12-10 PROCEDURE — 77030031139 HC SUT VCRL2 J&J -A: Performed by: COLON & RECTAL SURGERY

## 2019-12-10 PROCEDURE — 74011000250 HC RX REV CODE- 250: Performed by: COLON & RECTAL SURGERY

## 2019-12-10 PROCEDURE — 74011000258 HC RX REV CODE- 258: Performed by: COLON & RECTAL SURGERY

## 2019-12-10 PROCEDURE — 77030040830 HC CATH URETH FOL MDII -A: Performed by: COLON & RECTAL SURGERY

## 2019-12-10 PROCEDURE — 77030008684 HC TU ET CUF COVD -B: Performed by: NURSE ANESTHETIST, CERTIFIED REGISTERED

## 2019-12-10 PROCEDURE — 76010000153 HC OR TIME 1.5 TO 2 HR: Performed by: COLON & RECTAL SURGERY

## 2019-12-10 DEVICE — POLYPROPYLENE NON-ABSORBABLE SYNTHETIC SURGICAL MESH
Type: IMPLANTABLE DEVICE | Site: ABDOMEN | Status: FUNCTIONAL
Brand: PROLENE

## 2019-12-10 RX ORDER — HYDROCODONE BITARTRATE AND ACETAMINOPHEN 5; 325 MG/1; MG/1
1 TABLET ORAL AS NEEDED
Status: DISCONTINUED | OUTPATIENT
Start: 2019-12-10 | End: 2019-12-10 | Stop reason: HOSPADM

## 2019-12-10 RX ORDER — KETOROLAC TROMETHAMINE 30 MG/ML
INJECTION, SOLUTION INTRAMUSCULAR; INTRAVENOUS AS NEEDED
Status: DISCONTINUED | OUTPATIENT
Start: 2019-12-10 | End: 2019-12-10 | Stop reason: HOSPADM

## 2019-12-10 RX ORDER — PROPOFOL 10 MG/ML
INJECTION, EMULSION INTRAVENOUS AS NEEDED
Status: DISCONTINUED | OUTPATIENT
Start: 2019-12-10 | End: 2019-12-10 | Stop reason: HOSPADM

## 2019-12-10 RX ORDER — LIDOCAINE HYDROCHLORIDE 10 MG/ML
0.1 INJECTION, SOLUTION EPIDURAL; INFILTRATION; INTRACAUDAL; PERINEURAL AS NEEDED
Status: DISCONTINUED | OUTPATIENT
Start: 2019-12-10 | End: 2019-12-10 | Stop reason: HOSPADM

## 2019-12-10 RX ORDER — FENTANYL CITRATE 50 UG/ML
50 INJECTION, SOLUTION INTRAMUSCULAR; INTRAVENOUS AS NEEDED
Status: DISCONTINUED | OUTPATIENT
Start: 2019-12-10 | End: 2019-12-10 | Stop reason: HOSPADM

## 2019-12-10 RX ORDER — SODIUM CHLORIDE, SODIUM LACTATE, POTASSIUM CHLORIDE, CALCIUM CHLORIDE 600; 310; 30; 20 MG/100ML; MG/100ML; MG/100ML; MG/100ML
1000 INJECTION, SOLUTION INTRAVENOUS CONTINUOUS
Status: DISCONTINUED | OUTPATIENT
Start: 2019-12-10 | End: 2019-12-10 | Stop reason: HOSPADM

## 2019-12-10 RX ORDER — ALBUTEROL SULFATE 0.83 MG/ML
2.5 SOLUTION RESPIRATORY (INHALATION) AS NEEDED
Status: DISCONTINUED | OUTPATIENT
Start: 2019-12-10 | End: 2019-12-10 | Stop reason: HOSPADM

## 2019-12-10 RX ORDER — MIDAZOLAM HYDROCHLORIDE 1 MG/ML
1 INJECTION, SOLUTION INTRAMUSCULAR; INTRAVENOUS AS NEEDED
Status: DISCONTINUED | OUTPATIENT
Start: 2019-12-10 | End: 2019-12-10 | Stop reason: HOSPADM

## 2019-12-10 RX ORDER — OXYCODONE HYDROCHLORIDE 5 MG/1
5-10 TABLET ORAL
Qty: 40 TAB | Refills: 0 | Status: SHIPPED | OUTPATIENT
Start: 2019-12-10 | End: 2019-12-17

## 2019-12-10 RX ORDER — MORPHINE SULFATE 2 MG/ML
2 INJECTION, SOLUTION INTRAMUSCULAR; INTRAVENOUS
Status: DISCONTINUED | OUTPATIENT
Start: 2019-12-10 | End: 2019-12-10 | Stop reason: HOSPADM

## 2019-12-10 RX ORDER — LIDOCAINE HYDROCHLORIDE 20 MG/ML
INJECTION, SOLUTION EPIDURAL; INFILTRATION; INTRACAUDAL; PERINEURAL AS NEEDED
Status: DISCONTINUED | OUTPATIENT
Start: 2019-12-10 | End: 2019-12-10 | Stop reason: HOSPADM

## 2019-12-10 RX ORDER — FENTANYL CITRATE 50 UG/ML
25 INJECTION, SOLUTION INTRAMUSCULAR; INTRAVENOUS
Status: DISCONTINUED | OUTPATIENT
Start: 2019-12-10 | End: 2019-12-10 | Stop reason: HOSPADM

## 2019-12-10 RX ORDER — SODIUM CHLORIDE 9 MG/ML
25 INJECTION, SOLUTION INTRAVENOUS CONTINUOUS
Status: DISCONTINUED | OUTPATIENT
Start: 2019-12-10 | End: 2019-12-10 | Stop reason: HOSPADM

## 2019-12-10 RX ORDER — PHENYLEPHRINE HCL IN 0.9% NACL 0.4MG/10ML
SYRINGE (ML) INTRAVENOUS AS NEEDED
Status: DISCONTINUED | OUTPATIENT
Start: 2019-12-10 | End: 2019-12-10 | Stop reason: HOSPADM

## 2019-12-10 RX ORDER — ONDANSETRON 2 MG/ML
4 INJECTION INTRAMUSCULAR; INTRAVENOUS AS NEEDED
Status: DISCONTINUED | OUTPATIENT
Start: 2019-12-10 | End: 2019-12-10 | Stop reason: HOSPADM

## 2019-12-10 RX ORDER — SCOLOPAMINE TRANSDERMAL SYSTEM 1 MG/1
PATCH, EXTENDED RELEASE TRANSDERMAL AS NEEDED
Status: DISCONTINUED | OUTPATIENT
Start: 2019-12-10 | End: 2019-12-10 | Stop reason: HOSPADM

## 2019-12-10 RX ORDER — BUPIVACAINE HYDROCHLORIDE AND EPINEPHRINE 5; 5 MG/ML; UG/ML
30 INJECTION, SOLUTION EPIDURAL; INTRACAUDAL; PERINEURAL ONCE
Status: COMPLETED | OUTPATIENT
Start: 2019-12-10 | End: 2019-12-10

## 2019-12-10 RX ORDER — MIDAZOLAM HYDROCHLORIDE 1 MG/ML
INJECTION, SOLUTION INTRAMUSCULAR; INTRAVENOUS AS NEEDED
Status: DISCONTINUED | OUTPATIENT
Start: 2019-12-10 | End: 2019-12-10 | Stop reason: HOSPADM

## 2019-12-10 RX ORDER — DEXMEDETOMIDINE HYDROCHLORIDE 100 UG/ML
INJECTION, SOLUTION INTRAVENOUS AS NEEDED
Status: DISCONTINUED | OUTPATIENT
Start: 2019-12-10 | End: 2019-12-10 | Stop reason: HOSPADM

## 2019-12-10 RX ORDER — SUCCINYLCHOLINE CHLORIDE 20 MG/ML
INJECTION INTRAMUSCULAR; INTRAVENOUS AS NEEDED
Status: DISCONTINUED | OUTPATIENT
Start: 2019-12-10 | End: 2019-12-10 | Stop reason: HOSPADM

## 2019-12-10 RX ORDER — DEXAMETHASONE SODIUM PHOSPHATE 4 MG/ML
INJECTION, SOLUTION INTRA-ARTICULAR; INTRALESIONAL; INTRAMUSCULAR; INTRAVENOUS; SOFT TISSUE AS NEEDED
Status: DISCONTINUED | OUTPATIENT
Start: 2019-12-10 | End: 2019-12-10 | Stop reason: HOSPADM

## 2019-12-10 RX ORDER — DIPHENHYDRAMINE HYDROCHLORIDE 50 MG/ML
12.5 INJECTION, SOLUTION INTRAMUSCULAR; INTRAVENOUS AS NEEDED
Status: DISCONTINUED | OUTPATIENT
Start: 2019-12-10 | End: 2019-12-10 | Stop reason: HOSPADM

## 2019-12-10 RX ORDER — HYDROMORPHONE HYDROCHLORIDE 1 MG/ML
0.2 INJECTION, SOLUTION INTRAMUSCULAR; INTRAVENOUS; SUBCUTANEOUS
Status: DISCONTINUED | OUTPATIENT
Start: 2019-12-10 | End: 2019-12-10 | Stop reason: HOSPADM

## 2019-12-10 RX ORDER — PROCHLORPERAZINE EDISYLATE 5 MG/ML
INJECTION INTRAMUSCULAR; INTRAVENOUS
Status: COMPLETED
Start: 2019-12-10 | End: 2019-12-10

## 2019-12-10 RX ORDER — ONDANSETRON 2 MG/ML
INJECTION INTRAMUSCULAR; INTRAVENOUS AS NEEDED
Status: DISCONTINUED | OUTPATIENT
Start: 2019-12-10 | End: 2019-12-10 | Stop reason: HOSPADM

## 2019-12-10 RX ORDER — GLYCOPYRROLATE 0.2 MG/ML
0.2 INJECTION INTRAMUSCULAR; INTRAVENOUS
Status: DISCONTINUED | OUTPATIENT
Start: 2019-12-10 | End: 2019-12-10 | Stop reason: HOSPADM

## 2019-12-10 RX ORDER — ACETAMINOPHEN 325 MG/1
650 TABLET ORAL ONCE
Status: COMPLETED | OUTPATIENT
Start: 2019-12-10 | End: 2019-12-10

## 2019-12-10 RX ORDER — ROCURONIUM BROMIDE 10 MG/ML
INJECTION, SOLUTION INTRAVENOUS AS NEEDED
Status: DISCONTINUED | OUTPATIENT
Start: 2019-12-10 | End: 2019-12-10 | Stop reason: HOSPADM

## 2019-12-10 RX ORDER — ONDANSETRON 4 MG/1
4 TABLET, ORALLY DISINTEGRATING ORAL
Qty: 20 TAB | Refills: 0 | Status: SHIPPED | OUTPATIENT
Start: 2019-12-10 | End: 2022-01-18 | Stop reason: ALTCHOICE

## 2019-12-10 RX ORDER — ROPIVACAINE HYDROCHLORIDE 5 MG/ML
30 INJECTION, SOLUTION EPIDURAL; INFILTRATION; PERINEURAL AS NEEDED
Status: DISCONTINUED | OUTPATIENT
Start: 2019-12-10 | End: 2019-12-10 | Stop reason: HOSPADM

## 2019-12-10 RX ORDER — FENTANYL CITRATE 50 UG/ML
INJECTION, SOLUTION INTRAMUSCULAR; INTRAVENOUS AS NEEDED
Status: DISCONTINUED | OUTPATIENT
Start: 2019-12-10 | End: 2019-12-10 | Stop reason: HOSPADM

## 2019-12-10 RX ORDER — MIDAZOLAM HYDROCHLORIDE 1 MG/ML
0.5 INJECTION, SOLUTION INTRAMUSCULAR; INTRAVENOUS
Status: DISCONTINUED | OUTPATIENT
Start: 2019-12-10 | End: 2019-12-10 | Stop reason: HOSPADM

## 2019-12-10 RX ADMIN — SODIUM CHLORIDE, SODIUM LACTATE, POTASSIUM CHLORIDE, AND CALCIUM CHLORIDE 1000 ML: 600; 310; 30; 20 INJECTION, SOLUTION INTRAVENOUS at 11:17

## 2019-12-10 RX ADMIN — MIDAZOLAM HYDROCHLORIDE 3 MG: 1 INJECTION, SOLUTION INTRAMUSCULAR; INTRAVENOUS at 11:30

## 2019-12-10 RX ADMIN — PROCHLORPERAZINE EDISYLATE 5 MG: 5 INJECTION INTRAMUSCULAR; INTRAVENOUS at 14:57

## 2019-12-10 RX ADMIN — MIDAZOLAM HYDROCHLORIDE 2 MG: 1 INJECTION, SOLUTION INTRAMUSCULAR; INTRAVENOUS at 11:34

## 2019-12-10 RX ADMIN — CEFOTETAN DISODIUM 2 G: 2 INJECTION, POWDER, FOR SOLUTION INTRAMUSCULAR; INTRAVENOUS at 11:45

## 2019-12-10 RX ADMIN — Medication 40 MCG: at 11:40

## 2019-12-10 RX ADMIN — KETOROLAC TROMETHAMINE 30 MG: 30 INJECTION, SOLUTION INTRAMUSCULAR; INTRAVENOUS at 12:33

## 2019-12-10 RX ADMIN — ACETAMINOPHEN 650 MG: 325 TABLET ORAL at 11:04

## 2019-12-10 RX ADMIN — ONDANSETRON HYDROCHLORIDE 4 MG: 2 INJECTION, SOLUTION INTRAMUSCULAR; INTRAVENOUS at 11:15

## 2019-12-10 RX ADMIN — Medication 40 MCG: at 12:50

## 2019-12-10 RX ADMIN — DEXMEDETOMIDINE HYDROCHLORIDE 3 MCG: 100 INJECTION, SOLUTION, CONCENTRATE INTRAVENOUS at 12:29

## 2019-12-10 RX ADMIN — PROPOFOL 150 MG: 10 INJECTION, EMULSION INTRAVENOUS at 12:42

## 2019-12-10 RX ADMIN — Medication 40 MCG: at 12:08

## 2019-12-10 RX ADMIN — PROPOFOL 150 MG: 10 INJECTION, EMULSION INTRAVENOUS at 11:39

## 2019-12-10 RX ADMIN — SUCCINYLCHOLINE CHLORIDE 160 MG: 20 INJECTION, SOLUTION INTRAMUSCULAR; INTRAVENOUS at 11:40

## 2019-12-10 RX ADMIN — DEXAMETHASONE SODIUM PHOSPHATE 4 MG: 4 INJECTION, SOLUTION INTRAMUSCULAR; INTRAVENOUS at 11:44

## 2019-12-10 RX ADMIN — ROCURONIUM BROMIDE 30 MG: 10 SOLUTION INTRAVENOUS at 11:44

## 2019-12-10 RX ADMIN — ROCURONIUM BROMIDE 10 MG: 10 SOLUTION INTRAVENOUS at 11:38

## 2019-12-10 RX ADMIN — Medication 40 MCG: at 12:16

## 2019-12-10 RX ADMIN — SCOPALAMINE 1 PATCH: 1 PATCH, EXTENDED RELEASE TRANSDERMAL at 11:15

## 2019-12-10 RX ADMIN — DEXMEDETOMIDINE HYDROCHLORIDE 6 MCG: 100 INJECTION, SOLUTION, CONCENTRATE INTRAVENOUS at 12:40

## 2019-12-10 RX ADMIN — DEXMEDETOMIDINE HYDROCHLORIDE 3 MCG: 100 INJECTION, SOLUTION, CONCENTRATE INTRAVENOUS at 11:54

## 2019-12-10 RX ADMIN — LIDOCAINE HYDROCHLORIDE 80 MG: 20 INJECTION, SOLUTION EPIDURAL; INFILTRATION; INTRACAUDAL; PERINEURAL at 11:38

## 2019-12-10 RX ADMIN — FENTANYL CITRATE 50 MCG: 50 INJECTION, SOLUTION INTRAMUSCULAR; INTRAVENOUS at 11:38

## 2019-12-10 RX ADMIN — FENTANYL CITRATE 50 MCG: 50 INJECTION, SOLUTION INTRAMUSCULAR; INTRAVENOUS at 11:54

## 2019-12-10 RX ADMIN — DEXMEDETOMIDINE HYDROCHLORIDE 6 MCG: 100 INJECTION, SOLUTION, CONCENTRATE INTRAVENOUS at 12:43

## 2019-12-10 RX ADMIN — PROPOFOL 50 MG: 10 INJECTION, EMULSION INTRAVENOUS at 12:40

## 2019-12-10 RX ADMIN — DEXMEDETOMIDINE HYDROCHLORIDE 3 MCG: 100 INJECTION, SOLUTION, CONCENTRATE INTRAVENOUS at 12:33

## 2019-12-10 RX ADMIN — DEXMEDETOMIDINE HYDROCHLORIDE 3 MCG: 100 INJECTION, SOLUTION, CONCENTRATE INTRAVENOUS at 12:00

## 2019-12-10 RX ADMIN — Medication 40 MCG: at 12:21

## 2019-12-10 RX ADMIN — Medication 40 MCG: at 12:56

## 2019-12-10 NOTE — DISCHARGE INSTRUCTIONS
Discharge Instructions      1. Do not lift any objects weighing more than 10 pounds for 4 weeks after the surgery date. 2. Do not do any housework including vacuuming, scrubbing or yardwork for 4 weeks after the surgery date. 3. Do not drive for 2 weeks after the surgery date or while taking sedating medications. 4. You should walk frequently and you should go up and down stairs as desired. 5. You may shower, but do not submerge your abdomen. Do not take tub baths, swim, or use hot tubs for the next 4 weeks. 6. You have surgical glue on your incision. It will dissolve over time. Do not scrub around the incisions or attempt to remove the glue. 7. Resume your usual diet as tolerated. 8. As you did before, take maximum doses of Tylenol (1000 mg every 6 hours) until you do not need pain medication any more. You may take other pain medication as prescribed in addition to the Tylenol (but not in place of it) if you have already had as much Tylenol as you are allowed to have and your pain is not controlled well enough. 9. Follow up:  Please return  To Dr. Lizzie Trinh office at 11:00 AM on Friday 12/20/2019. If you need to change the appointment, please call [phone] on a weekday between 9:00 AM and noon. 10. If you experience fever (temperature greater than 100.5), chills, vomiting, redness around the incision, or drainage from the incision, please contact your surgeons office. 11. For anything other than scheduling, please call [phone].    ______________________________________________________________________    Anesthesia Discharge Instructions    After general anesthesia or intervenous sedation, for 24 hours or while taking prescription Narcotics:  · Limit your activities  · Do not drive or operate hazardous machinery  · If you have not urinated within 8 hours after discharge, please contact your surgeon on call.   · Do not make important personal or business decisions  · Do not drink alcoholic beverages    Report the following to your surgeon:  · Excessive pain, swelling, redness or odor of or around the surgical area  · Temperature over 100.5 degrees  · Nausea and vomiting lasting longer than 4 hours or if unable to take medication  · Any signs of decreased circulation or nerve impairment to extremity:  Change in color, persistent numbness, tingling, coldness or increased pain.   · Any questions    TYLENOL WAS GIVEN AT 11:00  TORADOL ( NSAID ) WAS GIVEN AT 12:30

## 2019-12-10 NOTE — H&P
Colorectal Surgery Pre-Operative History and Physical Examination Note          NAME:  Finn Sarabia   :   1960   MRN:   [id number]     Operation Date: 12/10/2019    Chief Complaint:  Incisional hernia     History of Present Illness: The patient is a 50-year-old female who underwent a hand-assisted laparoscopic low anterior resection, mobilization of the splenic flexure, creation of a colonic J-pouch, coloanal anastomosis, and creation of a loop ileostomy on 2018 for treatment of rectal cancer. She had first completed neoadjuvant chemoradiation therapy with the final radiation dose having been administered on 2018. The pre-treatment stage was [id number] (stage III). The final pathologic stage was [id number]; i.e., she had a complete pathologic response to the neoadjuvant therapy. She received adjuvant chemotherapy with FOLFOX, but she had significant trouble tolerating it and stopped it altogether in March. Ileostomy closure was performed on 2019. She had a prolonged and difficult recovery with significant problems with her bowel movements. She has been participating in pelvic floor physical therapy, which has been helpful, and she finally returned to work on 2019. On 10/4/2019, she presented for evaluation of an uncomfortable bulge on the right side of the abdomen that seemed to be protruding more after eating. Examination revealed weakness and a palpable mass at the right-sided abdominal scar consistent with an incisional hernia associated with the ileostomy closure site. A CT scan of the abdomen and pelvis performed on 12/3/2019 demonstrated a fat-containing hernia in that location and no definite evidence of metastatic disease. A colonoscopy performed by Dr. Denia Watson on 2019 revealed only normal post-resection anatomy.       PMH:  Past Medical History:   Diagnosis Date    Asthma     SEASONAL ALLERGY INDUCED    GERD (gastroesophageal reflux disease)     Rectal cancer ([facility] [address] 06/29/2018    Diagnosed via colonoscopy on 6/29/2018. Staged as [id number]. Treated with neoadjuvant chemoradiation using Xeloda and 5040 cGy radiation and with the final radiation dose administered on 9/20/2018. Resected on 12/13/2018. Treated with adjuvant chemotherapy that was not well tolerated and that was discontinued on 3/22/2019.  Thyroid disease 2013    hyperthyroidism       PSH:  Past Surgical History:   Procedure Laterality Date    HX COLONOSCOPY  06/29/2018    Dr. Deborah Hess.  HX COLONOSCOPY  12/05/2019    Normal post-resection anatomy; Dr. Deborah Hess.  HX GI  2005    Laparoscopic cholecystectomy.  HX GI  12/13/2018    Hand-assisted laparoscopic low anterior resection, mobilization of the splenic flexure, creation of colonic J-pouch, coloanal anastomosis, and creation of loop ileostomy; Dr. Karla Davila.  HX HEENT  2015    Sinus surgery.  HX ORTHOPAEDIC Right 1998    Right carpal tunnel release.  HX ORTHOPAEDIC  1982    Right ulnar nerve repositioning.  HX OTHER SURGICAL  04/30/2019    Ileostomy closure with resection and anastomosis; Dr. Karla Davila.  HX UROLOGICAL  12/13/2018    Cystoscopy and placement of temporary bilateral ureteral catheters; Mariah Gillette MD.    IR INSERT TUNL CVC W PORT OVER 5 YEARS  1/24/2019    IR REMOVE TUNL CVAD W PORT/PUMP  4/1/2019       Home Medications:  Cannot display prior to admission medications because the patient has not been admitted in this contact. Hospital Medications:  No current facility-administered medications for this encounter. Current Outpatient Medications   Medication Sig    CLARITIN-D 24 HOUR  mg per tablet TAKE 1 TABLET BY MOUTH EVERY DAY (Patient taking differently: daily as needed.)    terconazole (TERAZOL 7) 0.4 % vaginal cream INSERT 1 APPLICATORFUL IN VAGINA EVERY EVENING FOR 7 DAYS IF NEEDED FOR YEAST INFECTION    methIMAzole (TAPAZOLE) 5 mg tablet Take 2.5 mg by mouth nightly. Allergies:  No Known Allergies    Family History:  Family History   Problem Relation Age of Onset    Heart Disease Mother     Cancer Mother         CERVICAL    Hypertension Mother     Heart Disease Father     Hypertension Father     Heart Disease Brother     Anesth Problems Other        Social History:  Social History     Tobacco Use    Smoking status: Former Smoker     Packs/day: 0.50     Years: 10.00     Pack years: 5.00     Last attempt to quit:      Years since quittin.9    Smokeless tobacco: Never Used   Substance Use Topics    Alcohol use: Yes     Comment: RARE ETOH       Review of Systems:    Symptom Y/N Comments  Symptom Y/N Comments   Fever/Chills N   Chest Pain N    Cough N   Abdominal Pain Y    Sputum N   Joint Pain     SOB/MUNOZ N   Pruritis/Rash     Nausea/vomit N   Tolerating PT/OT Y    Diarrhea    Tolerating Diet Y    Constipation    Other       Could NOT obtain due to:        Objective:     PHYSICAL EXAMINATION:    No apparent distress. There is no scleral icterus. The lungs are clear to auscultation bilaterally. The heart sounds are regular in rate and rhythm with no murmurs, gallops, or rubs. The abdomen is soft, non-tender, and non-distended. The scars are well healed. There is no palpable mass or hernia defect associate with the midline scar, There is no appreciable organomegaly. The patient is alert and oriented, and there are no gross neurologic deficits. Data Review    12/3/2019 13:49   WBC 5.3   HGB 12.8   HCT 39.3   PLATELET 314   Sodium 137   Potassium 4.2   Chloride 102   CO2 20   Glucose 93   BUN 12   Creatinine 0.75   BUN/Creatinine ratio 16   Calcium 9.0   GFR est non-AA 88   Albumin 4.4   ALT (SGPT) 20   AST 15   Alk. phosphatase 97   CEA 0.7                     Assessment and Plan:      The patient has a symptomatic ventral incisional hernia for which repair is indicated. The operation will include the placement of a synthetic mesh.   The risks have been discussed in detail, and she has agreed to proceed.       Active Problems:    Incisional hernia of anterior abdominal wall without obstruction or gangrene (12/9/2019)

## 2019-12-10 NOTE — BRIEF OP NOTE
BRIEF OPERATIVE NOTE    Date of Procedure:  12/10/2019   Preoperative Diagnosis:  Ventral incisional hernia. Postoperative Diagnosis:  Ventral incisional hernia. Procedure:  Ventral incisional hernia repair with Prolene mesh. Surgeon:  Ajith Robbins MD  Assistant:  Pepe Geiger. SA Ajay;  Jacky Torres  Anesthesia:  General endotracheal  Estimated Blood Loss:  < 10 mL  Crystalloid:  500 mL  Urine:  230 mL  Specimens:   ID Type Source Tests Collected by Time Destination   Hernia Sac Fresh Hernia Sac  Yesenia Soliz MD 12/10/2019 1208 Pathology     Tubes and Drains:  None. Findings: 3 cm x 6 cm fascial defect associated with the ileostomy site wound containing omentum but no bowel. Complications:  None apparent. Implants:  Implant Name Type Inv.  Item Serial No.  Lot No. LRB No. Used Action   MESH SARAH PROL 3X6IN --  - SNA  MESH SARAH PROL 3X6IN --  NA [employer] [id number] N/A 1 Implanted

## 2019-12-10 NOTE — ANESTHESIA POSTPROCEDURE EVALUATION
Post-Anesthesia Evaluation and Assessment    Patient: Veronica Pino MRN: [id number]  SSN: [id number]    YOB: 1960  Age: 63 y.o. Sex: female      I have evaluated the patient and they are stable and ready for discharge from the PACU. Cardiovascular Function/Vital Signs  Visit Vitals  /71   Pulse 82   Temp 36.4 °C (97.5 °F)   Resp 12   Ht 5' 7\" (1.702 m)   Wt 95.7 kg (211 lb)   SpO2 98%   BMI 33.05 kg/m²       Patient is status post General anesthesia for Procedure(s):  INCISIONAL HERNIA  REPAIR WITH MESH. Nausea/Vomiting: None    Postoperative hydration reviewed and adequate. Pain:  Pain Scale 1: Numeric (0 - 10) (12/10/19 1427)  Pain Intensity 1: 0 (12/10/19 1427)   Managed    Neurological Status:   Neuro (WDL): Within Defined Limits (12/10/19 1427)  Neuro  Neurologic State: Appropriate for age;Drowsy (12/10/19 1427)  Orientation Level: Oriented X4 (12/10/19 1427)  Cognition: Follows commands (12/10/19 1427)  Speech: Appropriate for age;Clear (12/10/19 1427)  LUE Motor Response: Purposeful (12/10/19 1427)  LLE Motor Response: Purposeful (12/10/19 1427)  RUE Motor Response: Purposeful (12/10/19 1427)  RLE Motor Response: Purposeful (12/10/19 1427)   At baseline    Mental Status, Level of Consciousness: Alert and  oriented to person, place, and time    Pulmonary Status:   O2 Device: Room air (12/10/19 1427)   Adequate oxygenation and airway patent    Complications related to anesthesia: None    Post-anesthesia assessment completed. No concerns    Signed By: Sarah Jordan MD     December 10, 2019              Procedure(s):  INCISIONAL HERNIA  REPAIR WITH MESH. general    <BSHSIANPOST>    Vitals Value Taken Time   /78 12/10/2019  2:30 PM   Temp 36.4 °C (97.5 °F) 12/10/2019  1:24 PM   Pulse 86 12/10/2019  2:37 PM   Resp 12 12/10/2019  2:37 PM   SpO2 93 % 12/10/2019  2:37 PM   Vitals shown include unvalidated device data.

## 2019-12-10 NOTE — ROUTINE PROCESS
Patient: Deb Camara MRN: [id number]  SSN: [id number]   YOB: 1960  Age: 63 y.o. Sex: female     Patient is status post Procedure(s):  INCISIONAL HERNIA  REPAIR WITH MESH. Surgeon(s) and Role:     * Ángela Regalado MD - Primary    Local/Dose/Irrigation:  Normal saline used for irrigation, see emar for local.                  Peripheral IV 12/10/19 Left Hand (Active)            Airway - Endotracheal Tube 12/10/19 Oral (Active)                   Dressing/Packing:  Wound Abdomen-Dressing Type: Sutures; Topical skin adhesive/glue(Exofin) (12/10/19 1252)    Splint/Cast:  ]    Other:  SCDs, jefferson discontinued at end of case

## 2019-12-10 NOTE — PERIOP NOTES
ON TRANSFER TO PHASE 2 PATIENT GOT NAUSEOUS. DR. Diamond Acosta CALLED FOR COMPAZINE ORDER AND RECEIVING ADDITIONAL IVF.

## 2019-12-12 NOTE — OP NOTES
[facility]  OPERATIVE REPORT    Name:  Maryann Tidwell  MR#:  [id number]  :  1960  ACCOUNT #:  [de-identified]    DATE OF SERVICE:  12/10/2019    PREOPERATIVE DIAGNOSIS:   Ventral incisional hernia. POSTOPERATIVE DIAGNOSIS:  Ventral incisional hernia. PROCEDURE PERFORMED:  Ventral incisional hernia repair with Prolene mesh. SURGEON:  Ciarra Meyer MD    ASSISTANTS:  Topher Parra. SA Ajay, and Kelsie. [place], [place]    ANESTHESIA:  General endotracheal.    SPECIMENS REMOVED:  Hernia sac. TUBES AND DRAINS:  None. ESTIMATED BLOOD LOSS:  Less than 10 mL. CRYSTALLOID:  500 mL. URINE OUTPUT:  230 mL. COMPLICATIONS:  None apparent. IMPLANTS:  3 inch x 6 inch Prolene mesh. INDICATIONS FOR PROCEDURE:  The patient is a 30-year-old female who underwent a hand-assisted laparoscopic lower anterior resection, mobilization of the splenic flexure, creation of a colonic J-pouch, coloanal anastomosis, and creation of a loop ileostomy on 2018 for treatment of rectal cancer. She had first completed neoadjuvant chemoradiation therapy with the final radiation dose having been administered on 2018. The pretreatment stage was uT2 N1 M0 (stage III). The final pathologic stage was [id number]; i.e., she had a complete pathologic response to the neoadjuvant therapy. She received adjuvant chemotherapy with FOLFOX, but she had significant trouble tolerating it and stopped it altogether in March. Ileostomy closure was performed on 2019. She had a prolonged and difficult recovery with significant problems with her bowel movements. She has been participating in pelvic floor physical therapy, which has been helpful, and she finally returned to work on 2019. On 10/04/2019, she presented for evaluation of an uncomfortable bulge on the right side of the abdomen that seemed to be protruding more after eating.   Examination revealed weakness and a palpable mass at the right side with abdominal scar consistent with an incisional hernia associated with the ileostomy closure site. A CT scan of the abdomen and pelvis performed on 12/03/2019 demonstrated a fat-contained hernia in that location and no definite evidence of metastatic disease. A colonoscopy performed by Dr. Diana Salomon on 12/05/2019 revealed only normal post-resection anatomy; however, it was incomplete secondary to an inadequate bowel preparation. The ventral incisional hernia is symptomatic, and repair was therefore indicated. There is no contraindication to repair. The nature of the procedure and its risks, including those associated with placement of synthetic mesh, were discussed in detail, and the patient agreed to proceed. OPERATIVE FINDINGS:  There was a 3 cm x 6 cm fascial defect associated with the ileostomy site wound containing omentum, but no bowel. DESCRIPTION OF PROCEDURE:  After informed consent was obtained, the patient was taken to the operating room where standard monitoring devices were attached and adequate general endotracheal anesthesia was induced. 2 g of Cefotetan was administered parenterally. Using standard sterile technique, a Acosta catheter was placed in the bladder. The lower extremities were fitted with pneumatic compression stockings. The abdomen was prepped and draped in the usual sterile fashion. 0.5% bupivacaine with epinephrine was infiltrated subcutaneously. A scalpel was used to incise the ileostomy site scar on the right side of the abdomen. The dissection was carefully extended through the subcutaneous tissues until the hernia sac was encountered. The sac was opened and the contained omentum was reduced. The edges of the fascial defect between the omentum and the hernia sac was identified. The hernia sac was dissected out. Some adhesions to the anterior abdominal wall were also carefully divided using electrocautery and scissors.   Once the abdominal wall was completely freed, it was decided to perform a primary repair with sutures and reinforcement with an on-lay mesh. Multiple 0 Prolene sutures were placed through and through the fascia around the circumference of the defect. The defect was then closed transversely with two running #1 PDS sutures and a 3 inch x 6 inch piece of Prolene mesh was obtained and placed as an on-lay using the previously placed 0 Prolene sutures, passed through the mesh and then tied down to secure the mesh in place. The mesh was trimmed to the appropriate size. The cavity was copiously irrigated with saline. 2-0 Vicryl sutures were used in two layers to reapproximate the subcutaneous adipose tissue. Repeat irrigation was performed between the layers. The skin closure was then performed using subcuticular 4-0 Monocryl sutures and Exofin. There were no apparent complications, and the patient appeared to have tolerated the procedure well. At its conclusion, the Acosta catheter was removed and the patient was extubated and transported in stable condition to the recovery room.           Ismael Scott MD      [id number]  D:  12/11/2019 23:30  T:  12/12/2019 6:35  JOB #:  [id number]  CC:  MD Leodan Martinez MD Dana Kempf, MD Julie Lor, MD Benito Curia, MD

## 2020-01-04 DIAGNOSIS — C77.5 RECTAL CANCER METASTASIZED TO INTRAPELVIC LYMPH NODE (HCC): ICD-10-CM

## 2020-01-04 DIAGNOSIS — C20 RECTAL CANCER METASTASIZED TO INTRAPELVIC LYMPH NODE (HCC): ICD-10-CM

## 2020-02-24 DIAGNOSIS — C20 RECTAL CANCER METASTASIZED TO INTRAPELVIC LYMPH NODE (HCC): Primary | ICD-10-CM

## 2020-02-24 DIAGNOSIS — C77.5 RECTAL CANCER METASTASIZED TO INTRAPELVIC LYMPH NODE (HCC): Primary | ICD-10-CM

## 2020-02-28 ENCOUNTER — OFFICE VISIT (OUTPATIENT)
Dept: FAMILY MEDICINE CLINIC | Age: 60
End: 2020-02-28

## 2020-02-28 VITALS
HEART RATE: 85 BPM | RESPIRATION RATE: 16 BRPM | DIASTOLIC BLOOD PRESSURE: 80 MMHG | TEMPERATURE: 98.1 F | BODY MASS INDEX: 34.06 KG/M2 | WEIGHT: 217 LBS | SYSTOLIC BLOOD PRESSURE: 131 MMHG | HEIGHT: 67 IN | OXYGEN SATURATION: 98 %

## 2020-02-28 DIAGNOSIS — J40 BRONCHITIS: Primary | ICD-10-CM

## 2020-02-28 RX ORDER — AZITHROMYCIN 250 MG/1
TABLET, FILM COATED ORAL
Qty: 6 TAB | Refills: 0 | Status: SHIPPED | OUTPATIENT
Start: 2020-02-28 | End: 2020-03-04

## 2020-02-28 RX ORDER — TRIAMCINOLONE ACETONIDE 1 MG/G
CREAM TOPICAL
COMMUNITY
Start: 2019-12-05

## 2020-02-28 RX ORDER — NYSTATIN 100000 U/G
CREAM TOPICAL
COMMUNITY
Start: 2019-12-05

## 2020-02-28 NOTE — PROGRESS NOTES
Assessment and Plan    1. Bronchitis  Viral vs bacterial vs allergies  FU if sx fail to resolve  - azithromycin (ZITHROMAX) 250 mg tablet; Take 2 tablets today, then take 1 tablet daily  Dispense: 6 Tab; Refill: 0    2. Trigger index finger  Ice, avoid trauma, follow up if persists for steroid injection. Follow-up and Dispositions    · Return if symptoms worsen or fail to improve. Diagnosis and plan discussed with patient who verbillized understanding. History of present illness:Jayshree Thompson is a 63 y.o. female presenting for Sinus Infection (chest congestion)    Cough, congestion in chest  Twinge in upper chest when she coughs  Sx present for a couple of weeks  No fever or chills  Some post nasal drip  Sinus surgery 2015, deviated septum repair and sinus openings fixed  No facial pressure  Non smoker. No unusual travel or exposures. Colon cancer  NIKOLE so far  DX 7/2018    Triggering right index finger several weeks      Review of Systems   Respiratory: Positive for cough and sputum production. Negative for shortness of breath and wheezing. Cardiovascular: Negative for chest pain and leg swelling. Gastrointestinal: Negative. Genitourinary: Negative. Musculoskeletal: Positive for joint pain. Psychiatric/Behavioral: Negative. Past Medical History:   Diagnosis Date    Asthma     SEASONAL ALLERGY INDUCED    GERD (gastroesophageal reflux disease)     Rectal cancer ([facility] [address] 06/29/2018    Diagnosed via colonoscopy on 6/29/2018. Staged as [id number]. Treated with neoadjuvant chemoradiation using Xeloda and 5040 cGy radiation and with the final radiation dose administered on 9/20/2018. Resected on 12/13/2018. Treated with adjuvant chemotherapy that was not well tolerated and that was discontinued on 3/22/2019.  Thyroid disease 2013    hyperthyroidism     Past Surgical History:   Procedure Laterality Date    HX COLONOSCOPY  06/29/2018    Dr. Aleisha Barros.     HX COLONOSCOPY  12/05/2019 Normal post-resection anatomy; Dr. Hernan Patel.  HX GI  2005    Laparoscopic cholecystectomy.  HX GI  2018    Hand-assisted laparoscopic low anterior resection, mobilization of the splenic flexure, creation of colonic J-pouch, coloanal anastomosis, and creation of loop ileostomy; Dr. Manda Gonzalez.  HX HEENT  2015    Sinus surgery.  HX ORTHOPAEDIC Right 1998    Right carpal tunnel release.  HX ORTHOPAEDIC  1982    Right ulnar nerve repositioning.  HX OTHER SURGICAL  2019    Ileostomy closure with resection and anastomosis; Dr. Manda Gonzalez.     HX UROLOGICAL  2018    Cystoscopy and placement of temporary bilateral ureteral catheters; Gerson Sosa MD.    IR INSERT TUNL CVC W PORT OVER 5 YEARS  2019    IR REMOVE TUNL CVAD W PORT/PUMP  2019     Family History   Problem Relation Age of Onset    Heart Disease Mother     Cancer Mother         CERVICAL    Hypertension Mother     Heart Disease Father     Hypertension Father     Heart Disease Brother     Anesth Problems Other      Social History     Socioeconomic History    Marital status:      Spouse name: Not on file    Number of children: Not on file    Years of education: Not on file    Highest education level: Not on file   Occupational History    Not on file   Social Needs    Financial resource strain: Not on file    Food insecurity:     Worry: Not on file     Inability: Not on file    Transportation needs:     Medical: Not on file     Non-medical: Not on file   Tobacco Use    Smoking status: Former Smoker     Packs/day: 0.50     Years: 10.00     Pack years: 5.00     Last attempt to quit:      Years since quittin.1    Smokeless tobacco: Never Used   Substance and Sexual Activity    Alcohol use: Yes     Comment: RARE ETOH    Drug use: No    Sexual activity: Not Currently   Lifestyle    Physical activity:     Days per week: Not on file     Minutes per session: Not on file    Stress: Not on file   Relationships    Social connections:     Talks on phone: Not on file     Gets together: Not on file     Attends Oriental orthodox service: Not on file     Active member of club or organization: Not on file     Attends meetings of clubs or organizations: Not on file     Relationship status: Not on file    Intimate partner violence:     Fear of current or ex partner: Not on file     Emotionally abused: Not on file     Physically abused: Not on file     Forced sexual activity: Not on file   Other Topics Concern    Not on file   Social History Narrative    Not on file         Prior to Admission medications    Medication Sig Start Date End Date Taking? Authorizing Provider   triamcinolone acetonide (KENALOG) 0.1 % topical cream JESSICA EXT AA BID . MIX SMALL AMOUNT IN HAND WITH NYSTATIN 12/5/19  Yes Provider, Historical   nystatin (MYCOSTATIN) topical cream JESSICA EXT AA BID . MIX SMALL AMOUNT IN HAND WITH TRIAM 12/5/19  Yes Provider, Historical   azithromycin (ZITHROMAX) 250 mg tablet Take 2 tablets today, then take 1 tablet daily 2/28/20 3/4/20 Yes Merritt Canela MD   ondansetron (ZOFRAN ODT) 4 mg disintegrating tablet Take 1 Tab by mouth every six (6) hours as needed for Nausea. 12/10/19  Yes Chase Chi MD   CLARITIN-D 24 HOUR  mg per tablet TAKE 1 TABLET BY MOUTH EVERY DAY  Patient taking differently: daily as needed. 9/3/19  Yes Merritt Canela MD   terconazole (TERAZOL 7) 0.4 % vaginal cream INSERT 1 APPLICATORFUL IN VAGINA EVERY EVENING FOR 7 DAYS IF NEEDED FOR YEAST INFECTION 8/30/18  Yes Provider, Historical   methIMAzole (TAPAZOLE) 5 mg tablet Take 2.5 mg by mouth nightly. Yes Provider, Historical        No Known Allergies    Vitals:    02/28/20 1130   BP: 131/80   Pulse: 85   Resp: 16   Temp: 98.1 °F (36.7 °C)   TempSrc: Oral   SpO2: 98%   Weight: 217 lb (98.4 kg)   Height: 5' 7\" (1.702 m)     Body mass index is 33.99 kg/m².       Objective  Physical Exam  Constitutional:       General: She is not in acute distress. Appearance: She is well-developed. She is not diaphoretic. HENT:      Head: Normocephalic. Right Ear: External ear normal.      Left Ear: External ear normal.      Nose: Nose normal.   Eyes:      General:         Right eye: No discharge. Left eye: No discharge. Conjunctiva/sclera: Conjunctivae normal.   Neck:      Musculoskeletal: Neck supple. Thyroid: No thyromegaly. Cardiovascular:      Rate and Rhythm: Normal rate and regular rhythm. Heart sounds: Normal heart sounds. No murmur. No friction rub. No gallop. Pulmonary:      Effort: Pulmonary effort is normal. No respiratory distress. Breath sounds: Normal breath sounds. No wheezing or rales. Lymphadenopathy:      Cervical: No cervical adenopathy. Neurological:      Mental Status: She is alert and oriented to person, place, and time. Psychiatric:         Behavior: Behavior normal.         Thought Content: Thought content normal.         Judgment: Judgment normal.       Stiff right index finger with mild triggering.

## 2020-02-28 NOTE — PROGRESS NOTES
Identified pt with two pt identifiers(name and ). Reviewed record in preparation for visit and have obtained necessary documentation. Chief Complaint   Patient presents with    Sinus Infection     chest congestion        Health Maintenance Due   Topic    Hepatitis C Screening     Pneumococcal 0-64 years (1 of 3 - PCV13)    DTaP/Tdap/Td series (1 - Tdap)    PAP AKA CERVICAL CYTOLOGY     Lipid Screen     Shingrix Vaccine Age 49> (1 of 2)    Breast Cancer Screen Mammogram     FOBT Q1Y Age 54-65     Influenza Age 5 to Adult        Coordination of Care Questionnaire:  :   1) Have you been to an emergency room, urgent care, or hospitalized since your last visit? If yes, where when, and reason for visit? No       2. Have seen or consulted any other health care provider since your last visit? If yes, where when, and reason for visit?   No

## 2020-04-20 RX ORDER — LORATADINE AND PSEUDOEPHEDRINE SULFATE 10; 240 MG/1; MG/1
1 TABLET, EXTENDED RELEASE ORAL DAILY
Qty: 30 TAB | Refills: 2 | Status: SHIPPED | OUTPATIENT
Start: 2020-04-20 | End: 2020-07-27 | Stop reason: SDUPTHER

## 2020-05-20 ENCOUNTER — HOSPITAL ENCOUNTER (OUTPATIENT)
Dept: CT IMAGING | Age: 60
Discharge: HOME | End: 2020-05-20
Attending: REGISTERED NURSE
Payer: COMMERCIAL

## 2020-05-20 DIAGNOSIS — C20 RECTAL CANCER METASTASIZED TO INTRAPELVIC LYMPH NODE (HCC): ICD-10-CM

## 2020-05-20 DIAGNOSIS — C77.5 RECTAL CANCER METASTASIZED TO INTRAPELVIC LYMPH NODE (HCC): ICD-10-CM

## 2020-05-20 PROCEDURE — 71260 CT THORAX DX C+: CPT

## 2020-05-20 PROCEDURE — 74011636320 HC RX REV CODE- 636/320: Performed by: RADIOLOGY

## 2020-05-20 PROCEDURE — 74177 CT ABD & PELVIS W/CONTRAST: CPT

## 2020-05-20 PROCEDURE — 74011000258 HC RX REV CODE- 258: Performed by: RADIOLOGY

## 2020-05-20 RX ORDER — SODIUM CHLORIDE 0.9 % (FLUSH) 0.9 %
10 SYRINGE (ML) INJECTION
Status: COMPLETED | OUTPATIENT
Start: 2020-05-20 | End: 2020-05-20

## 2020-05-20 RX ADMIN — IOPAMIDOL 100 ML: 755 INJECTION, SOLUTION INTRAVENOUS at 15:44

## 2020-05-20 RX ADMIN — SODIUM CHLORIDE 100 ML: 900 INJECTION, SOLUTION INTRAVENOUS at 15:44

## 2020-05-20 RX ADMIN — Medication 10 ML: at 15:44

## 2020-05-21 LAB
ALBUMIN SERPL-MCNC: 4.1 G/DL (ref 3.8–4.9)
ALBUMIN/GLOB SERPL: 1.6 {RATIO} (ref 1.2–2.2)
ALP SERPL-CCNC: 96 IU/L (ref 39–117)
ALT SERPL-CCNC: 31 IU/L (ref 0–32)
AST SERPL-CCNC: 21 IU/L (ref 0–40)
BASOPHILS # BLD AUTO: 0 X10E3/UL (ref 0–0.2)
BASOPHILS NFR BLD AUTO: 1 %
BILIRUB SERPL-MCNC: 0.3 MG/DL (ref 0–1.2)
BUN SERPL-MCNC: 9 MG/DL (ref 6–24)
BUN/CREAT SERPL: 13 (ref 9–23)
CALCIUM SERPL-MCNC: 9 MG/DL (ref 8.7–10.2)
CEA SERPL-MCNC: 0.6 NG/ML (ref 0–4.7)
CHLORIDE SERPL-SCNC: 102 MMOL/L (ref 96–106)
CO2 SERPL-SCNC: 22 MMOL/L (ref 20–29)
CREAT SERPL-MCNC: 0.67 MG/DL (ref 0.57–1)
EOSINOPHIL # BLD AUTO: 0.1 X10E3/UL (ref 0–0.4)
EOSINOPHIL NFR BLD AUTO: 2 %
ERYTHROCYTE [DISTWIDTH] IN BLOOD BY AUTOMATED COUNT: 14.6 % (ref 11.7–15.4)
GLOBULIN SER CALC-MCNC: 2.5 G/DL (ref 1.5–4.5)
GLUCOSE SERPL-MCNC: 93 MG/DL (ref 65–99)
HCT VFR BLD AUTO: 38.5 % (ref 34–46.6)
HGB BLD-MCNC: 12.6 G/DL (ref 11.1–15.9)
IMM GRANULOCYTES # BLD AUTO: 0 X10E3/UL (ref 0–0.1)
IMM GRANULOCYTES NFR BLD AUTO: 0 %
LYMPHOCYTES # BLD AUTO: 1.1 X10E3/UL (ref 0.7–3.1)
LYMPHOCYTES NFR BLD AUTO: 21 %
MCH RBC QN AUTO: 26.4 PG (ref 26.6–33)
MCHC RBC AUTO-ENTMCNC: 32.7 G/DL (ref 31.5–35.7)
MCV RBC AUTO: 81 FL (ref 79–97)
MONOCYTES # BLD AUTO: 0.3 X10E3/UL (ref 0.1–0.9)
MONOCYTES NFR BLD AUTO: 6 %
NEUTROPHILS # BLD AUTO: 3.7 X10E3/UL (ref 1.4–7)
NEUTROPHILS NFR BLD AUTO: 70 %
PLATELET # BLD AUTO: 213 X10E3/UL (ref 150–450)
POTASSIUM SERPL-SCNC: 4.2 MMOL/L (ref 3.5–5.2)
PROT SERPL-MCNC: 6.6 G/DL (ref 6–8.5)
RBC # BLD AUTO: 4.77 X10E6/UL (ref 3.77–5.28)
SODIUM SERPL-SCNC: 139 MMOL/L (ref 134–144)
WBC # BLD AUTO: 5.2 X10E3/UL (ref 3.4–10.8)

## 2020-06-05 ENCOUNTER — VIRTUAL VISIT (OUTPATIENT)
Dept: ONCOLOGY | Age: 60
End: 2020-06-05

## 2020-06-05 DIAGNOSIS — C77.5 RECTAL CANCER METASTASIZED TO INTRAPELVIC LYMPH NODE (HCC): Primary | ICD-10-CM

## 2020-06-05 DIAGNOSIS — C20 RECTAL CANCER METASTASIZED TO INTRAPELVIC LYMPH NODE (HCC): Primary | ICD-10-CM

## 2020-06-05 NOTE — PROGRESS NOTES
[facility] at [address], [address] [place], [address] [place]: [phone]  F: [phone]    Reason for Visit:   Jennifer Vázquez is a 62 y.o. female who is seen by synchronous (real-time) audio-video technology on 6/5/2020 for follow up of  follow up for Rectal cancer. Treatment History:   · 6/29/18-colonoscopy was notable for a bleeding mass of malignant appearance in the mid rectum and distal rectum at a distance between 4 cm to 11 cm from the anus. Caused partial obstruction. · 7/6/18-CT chest abdomen pelvis showed rectal thickening, some perirectal stranding and several subcentimeter perirectal lymph nodes  · 7/11/18: Rectal ultrasound showed a partially circumferential mass seen in the distal rectum extending through the muscularis propria, 2 small lymph nodes about 6 mm in size adjacent to the mass. · 8/7/18~ 9/20/18-  Xeloda with Radiation  · 12/31/18: Hand-assisted laparoscopic low anterior resection- 0/17 nodes, no residual cancer and negative margins  · 2/6/19: adjuvant FOLFOX x 1 cycle  · 3/1/19: adjuvant CAPOX  · 6/19/19: CT with NIKOLE   · 12/3/19: CT with NIKOLE   · 5/2020: CT NIKOLE    History of Present Illness:   Patient is a 62 y.o. female who had not had a screening colonoscopy developed constipation and subsequently BRBPR x 5-6 months. She wondered if this was secondary to L thyroxine. However symptoms continued to progress and she was referred to GI for further evaluation. Further investigations as above. Completed neoadjuvant chemoradiation and comes after LAR on 12/3/1/18 by Dr. Kelvin Briggs. She received one cycle of adjuvant FOLFOX and chose not to continue due to grade 2 nausea, grade 1 neuropathy, and concerns over QOL with CADD pump. She declined therapy with single agent Xeloda. Been on surveillance    Colonoscopy in July with Dr. Yadira Lopez. She has a good appetite, denies diarrhea/constipation or nausea/vomiting. No aches or pain.  No unexplained weight loss. No new lumps or bumps. No bleeding. She has 1 adopted daughter and supportive    Quit smoking in   Rare ETOH    No FH of cancer. Past Medical History:   Diagnosis Date    Asthma     SEASONAL ALLERGY INDUCED    GERD (gastroesophageal reflux disease)     Rectal cancer ([facility] [address] 2018    Diagnosed via colonoscopy on 2018. Staged as [id number]. Treated with neoadjuvant chemoradiation using Xeloda and 5040 cGy radiation and with the final radiation dose administered on 2018. Resected on 2018. Treated with adjuvant chemotherapy that was not well tolerated and that was discontinued on 3/22/2019.  Thyroid disease     hyperthyroidism      Past Surgical History:   Procedure Laterality Date    HX COLONOSCOPY  2018    Dr. Tete Pompa.  HX COLONOSCOPY  2019    Normal post-resection anatomy; Dr. Tete Pompa.  HX GI  2005    Laparoscopic cholecystectomy.  HX GI  2018    Hand-assisted laparoscopic low anterior resection, mobilization of the splenic flexure, creation of colonic J-pouch, coloanal anastomosis, and creation of loop ileostomy; Dr. Shaquille Pastor.  HX HEENT      Sinus surgery.  HX ORTHOPAEDIC Right 1998    Right carpal tunnel release.  HX ORTHOPAEDIC  1982    Right ulnar nerve repositioning.  HX OTHER SURGICAL  2019    Ileostomy closure with resection and anastomosis; Dr. Shaquille Pastor.     HX UROLOGICAL  2018    Cystoscopy and placement of temporary bilateral ureteral catheters; Baudilio Duval MD.    IR INSERT TUNL CVC W PORT OVER 5 YEARS  2019    IR REMOVE TUNL CVAD W PORT/PUMP  2019      Social History     Tobacco Use    Smoking status: Former Smoker     Packs/day: 0.50     Years: 10.00     Pack years: 5.00     Last attempt to quit:      Years since quittin.4    Smokeless tobacco: Never Used   Substance Use Topics    Alcohol use: Yes     Comment: RARE ETOH      Family History   Problem Relation Age of Onset    Heart Disease Mother     Cancer Mother         CERVICAL    Hypertension Mother     Heart Disease Father     Hypertension Father     Heart Disease Brother     Anesth Problems Other      Current Outpatient Medications   Medication Sig    loratadine-pseudoephedrine (Claritin-D 24 Hour)  mg per tablet Take 1 Tab by mouth daily.  triamcinolone acetonide (KENALOG) 0.1 % topical cream JESSICA EXT AA BID . MIX SMALL AMOUNT IN HAND WITH NYSTATIN    nystatin (MYCOSTATIN) topical cream JESSICA EXT AA BID . MIX SMALL AMOUNT IN HAND WITH TRIAM    ondansetron (ZOFRAN ODT) 4 mg disintegrating tablet Take 1 Tab by mouth every six (6) hours as needed for Nausea.  terconazole (TERAZOL 7) 0.4 % vaginal cream INSERT 1 APPLICATORFUL IN VAGINA EVERY EVENING FOR 7 DAYS IF NEEDED FOR YEAST INFECTION    methIMAzole (TAPAZOLE) 5 mg tablet Take 2.5 mg by mouth nightly. No current facility-administered medications for this visit. No Known Allergies     Review of Systems: A complete review of systems was obtained, negative except as described above. Physical Exam:       Due to this being a TeleHealth evaluation, many elements of the physical examination are unable to be assessed. Constitutional: Appears well-developed and well-nourished in no apparent distress   Mental status: Alert and awake, Oriented to person/place/time, Able to follow commands  Eyes: EOM normal, Sclera normal, No visible ocular discharge  HENT: Normocephalic, atraumatic; Mouth/Throat: Moist mucous membranes, External Ears normal  Neck: No visualized mass  Pulmonary/Chest: Respiratory effort normal, No visualized signs of difficulty breathing or respiratory distress   Skin: No significant exanthematous lesions or discoloration noted on facial skin  Psychiatric: Normal affect, normal judgment/insight.  No hallucinations       Results:     Lab Results   Component Value Date/Time    WBC 5.2 05/20/2020 04:03 PM    HGB 12.6 05/20/2020 04:03 PM    HCT 38.5 05/20/2020 04:03 PM    PLATELET 295 04/05/3445 04:03 PM    MCV 81 05/20/2020 04:03 PM    ABS. NEUTROPHILS 3.7 05/20/2020 04:03 PM     Lab Results   Component Value Date/Time    Sodium 139 05/20/2020 04:03 PM    Potassium 4.2 05/20/2020 04:03 PM    Chloride 102 05/20/2020 04:03 PM    CO2 22 05/20/2020 04:03 PM    Glucose 93 05/20/2020 04:03 PM    BUN 9 05/20/2020 04:03 PM    Creatinine 0.67 05/20/2020 04:03 PM    GFR est  05/20/2020 04:03 PM    GFR est non-AA 97 05/20/2020 04:03 PM    Calcium 9.0 05/20/2020 04:03 PM     Lab Results   Component Value Date/Time    Bilirubin, total 0.3 05/20/2020 04:03 PM    ALT (SGPT) 31 05/20/2020 04:03 PM    Alk. phosphatase 96 05/20/2020 04:03 PM    Protein, total 6.6 05/20/2020 04:03 PM    Albumin 4.1 05/20/2020 04:03 PM    Globulin 3.6 04/15/2019 09:53 AM     Records reviewed and summarized above. Pathology report(s) reviewed above. Radiology report(s) reviewed above. CT 6/19/19:  IMPRESSION:  No evidence for metastatic disease. CT 12/3/19: IMPRESSION:     1. CT of the chest demonstrates no definite evidence of metastatic disease. 2. CT of the abdomen and pelvis demonstrates hepatic steatosis.     There is no definite evidence of metastatic disease.     There is slight haziness in the small bowel mesentery adjacent to surgical clips  around a small bowel loop most likely postoperative changes.     There are few tiny presacral lymph nodes measuring 5 mm or less in size which  appears stable.     There is a ventral wall hernia to the right of midline containing fat. 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 She poorly tolerated cycle 1 with grade 2 nausea and vomiting and grade 1 neuropathy.  Also CADD pump affected her QOL and she wishes to pursue a chemo treatment that does not involve a CADD. Decided to transition to CAPOX for 4 total cycles. She developed a grade 3 hypersensitivity reaction to Oxaliplatin  Symptoms lasted 2 weeks  She has declined further chemotherapy including single agent Xeloda    On Surveillance    Labs unremarkable. CEA WNL. ROS reassuring  Scans 5/2020 reviewed personally and NIKOLE    Repeat colonoscopy completed 2020 . Has repeat colonoscopy in 6 months scheduled with double prep. Reviewed NCCN guidelines for surveillance- labs every 3 months till year 2, scans every 6 months till year 2 then labs every 6 months and scans every year till year 5    2) Hypothyroidism  Controlled    3) Neuropathy  Resolved     4) Obesity  Reviewed life style changes  Healthy BMI    Plan:     · Continue surveillance  · CBC with diff, CMP, CEA every 3 months  RTC in 3 months with labs    I appreciate the opportunity to participate in Ms. Ernie Galeano's care. Signed By: Alexandre Ahuja MD      I was in the office while conducting this encounter. The patient was at her home. Consent:  She and/or her healthcare decision maker is aware that this patient-initiated Telehealth encounter is a billable service, with coverage as determined by her insurance carrier. She is aware that she may receive a bill and has provided verbal consent to proceed: Yes    Pursuant to the emergency declaration under the [address] and the [facility] [address] waiver authority and the [employer] Act, this Virtual  Visit was conducted, with patient's (and/or legal guardian's) consent, to reduce the patient's risk of exposure to COVID-19 and provide necessary medical care. Services were provided through a video synchronous discussion virtually to substitute for in-person visit.

## 2020-06-05 NOTE — PROGRESS NOTES
Madelyn Joseph is a 62 y.o. female  Chief Complaint   Patient presents with    Follow-up     Rectal Cancer     1. Have you been to the ER, urgent care clinic since your last visit? Hospitalized since your last visit? No    2. Have you seen or consulted any other health care providers outside of the [address] [place] [address] since your last visit? Include any pap smears or colon screening.  No

## 2020-06-06 DIAGNOSIS — C77.5 RECTAL CANCER METASTASIZED TO INTRAPELVIC LYMPH NODE (HCC): ICD-10-CM

## 2020-06-06 DIAGNOSIS — C20 RECTAL CANCER METASTASIZED TO INTRAPELVIC LYMPH NODE (HCC): ICD-10-CM

## 2020-07-27 RX ORDER — LORATADINE AND PSEUDOEPHEDRINE SULFATE 10; 240 MG/1; MG/1
1 TABLET, EXTENDED RELEASE ORAL DAILY
Qty: 30 TAB | Refills: 2 | Status: SHIPPED | OUTPATIENT
Start: 2020-07-27 | End: 2021-04-15

## 2020-08-28 ENCOUNTER — TELEPHONE (OUTPATIENT)
Dept: ONCOLOGY | Age: 60
End: 2020-08-28

## 2020-08-28 NOTE — TELEPHONE ENCOUNTER
Alyssa left requesting a call back. Called patient to see if she can move her 9/15 appointment to 2:30.

## 2020-09-05 DIAGNOSIS — C77.5 RECTAL CANCER METASTASIZED TO INTRAPELVIC LYMPH NODE (HCC): ICD-10-CM

## 2020-09-05 DIAGNOSIS — C20 RECTAL CANCER METASTASIZED TO INTRAPELVIC LYMPH NODE (HCC): ICD-10-CM

## 2020-09-11 LAB
ALBUMIN SERPL-MCNC: 4.3 G/DL (ref 3.8–4.9)
ALBUMIN/GLOB SERPL: 1.8 {RATIO} (ref 1.2–2.2)
ALP SERPL-CCNC: 102 IU/L (ref 39–117)
ALT SERPL-CCNC: 27 IU/L (ref 0–32)
AST SERPL-CCNC: 20 IU/L (ref 0–40)
BASOPHILS # BLD AUTO: 0 X10E3/UL (ref 0–0.2)
BASOPHILS NFR BLD AUTO: 0 %
BILIRUB SERPL-MCNC: 0.3 MG/DL (ref 0–1.2)
BUN SERPL-MCNC: 10 MG/DL (ref 6–24)
BUN/CREAT SERPL: 13 (ref 9–23)
CALCIUM SERPL-MCNC: 9.4 MG/DL (ref 8.7–10.2)
CEA SERPL-MCNC: 0.7 NG/ML (ref 0–4.7)
CHLORIDE SERPL-SCNC: 103 MMOL/L (ref 96–106)
CO2 SERPL-SCNC: 24 MMOL/L (ref 20–29)
CREAT SERPL-MCNC: 0.77 MG/DL (ref 0.57–1)
EOSINOPHIL # BLD AUTO: 0.1 X10E3/UL (ref 0–0.4)
EOSINOPHIL NFR BLD AUTO: 2 %
ERYTHROCYTE [DISTWIDTH] IN BLOOD BY AUTOMATED COUNT: 14.3 % (ref 11.7–15.4)
GLOBULIN SER CALC-MCNC: 2.4 G/DL (ref 1.5–4.5)
GLUCOSE SERPL-MCNC: 115 MG/DL (ref 65–99)
HCT VFR BLD AUTO: 41.5 % (ref 34–46.6)
HGB BLD-MCNC: 13.1 G/DL (ref 11.1–15.9)
IMM GRANULOCYTES # BLD AUTO: 0 X10E3/UL (ref 0–0.1)
IMM GRANULOCYTES NFR BLD AUTO: 0 %
LYMPHOCYTES # BLD AUTO: 1.2 X10E3/UL (ref 0.7–3.1)
LYMPHOCYTES NFR BLD AUTO: 21 %
MCH RBC QN AUTO: 26.3 PG (ref 26.6–33)
MCHC RBC AUTO-ENTMCNC: 31.6 G/DL (ref 31.5–35.7)
MCV RBC AUTO: 83 FL (ref 79–97)
MONOCYTES # BLD AUTO: 0.4 X10E3/UL (ref 0.1–0.9)
MONOCYTES NFR BLD AUTO: 7 %
NEUTROPHILS # BLD AUTO: 4 X10E3/UL (ref 1.4–7)
NEUTROPHILS NFR BLD AUTO: 70 %
PLATELET # BLD AUTO: 213 X10E3/UL (ref 150–450)
POTASSIUM SERPL-SCNC: 4.3 MMOL/L (ref 3.5–5.2)
PROT SERPL-MCNC: 6.7 G/DL (ref 6–8.5)
RBC # BLD AUTO: 4.98 X10E6/UL (ref 3.77–5.28)
SODIUM SERPL-SCNC: 140 MMOL/L (ref 134–144)
WBC # BLD AUTO: 5.8 X10E3/UL (ref 3.4–10.8)

## 2020-09-15 ENCOUNTER — OFFICE VISIT (OUTPATIENT)
Dept: ONCOLOGY | Age: 60
End: 2020-09-15
Payer: COMMERCIAL

## 2020-09-15 VITALS
WEIGHT: 211.6 LBS | OXYGEN SATURATION: 98 % | HEART RATE: 91 BPM | SYSTOLIC BLOOD PRESSURE: 149 MMHG | DIASTOLIC BLOOD PRESSURE: 85 MMHG | TEMPERATURE: 97.2 F | HEIGHT: 67 IN | BODY MASS INDEX: 33.21 KG/M2

## 2020-09-15 DIAGNOSIS — Z85.038 ENCOUNTER FOR FOLLOW-UP SURVEILLANCE OF COLON CANCER: ICD-10-CM

## 2020-09-15 DIAGNOSIS — C77.5 RECTAL CANCER METASTASIZED TO INTRAPELVIC LYMPH NODE (HCC): Primary | ICD-10-CM

## 2020-09-15 DIAGNOSIS — Z08 ENCOUNTER FOR FOLLOW-UP SURVEILLANCE OF COLON CANCER: ICD-10-CM

## 2020-09-15 DIAGNOSIS — C20 RECTAL CANCER METASTASIZED TO INTRAPELVIC LYMPH NODE (HCC): Primary | ICD-10-CM

## 2020-09-15 PROCEDURE — 99213 OFFICE O/P EST LOW 20 MIN: CPT | Performed by: REGISTERED NURSE

## 2020-09-15 NOTE — PROGRESS NOTES
Shea Whalen is a 62 y.o. female  Chief Complaint   Patient presents with    Follow-up     rectal cancer   1. Have you been to the ER, urgent care clinic since your last visit? Hospitalized since your last visit? No  2. Have you seen or consulted any other health care providers outside of the [address] [place] [address] since your last visit? Include any pap smears or colon screening.   No

## 2020-09-15 NOTE — PROGRESS NOTES
[facility] at [address], [address] [place]: [phone]  F: [phone]    Reason for Visit:   Ly Soto is a 62 y.o. female who is seen on 9/15/2020 for follow up of  follow up for Rectal cancer. Treatment History:   · 6/29/18-colonoscopy was notable for a bleeding mass of malignant appearance in the mid rectum and distal rectum at a distance between 4 cm to 11 cm from the anus. Caused partial obstruction. · 7/6/18-CT chest abdomen pelvis showed rectal thickening, some perirectal stranding and several subcentimeter perirectal lymph nodes  · 7/11/18: Rectal ultrasound showed a partially circumferential mass seen in the distal rectum extending through the muscularis propria, 2 small lymph nodes about 6 mm in size adjacent to the mass. · 8/7/18~ 9/20/18-  Xeloda with Radiation  · 12/31/18: Hand-assisted laparoscopic low anterior resection- 0/17 nodes, no residual cancer and negative margins  · 2/6/19: adjuvant FOLFOX x 1 cycle  · 3/1/19: adjuvant CAPOX  · 6/19/19: CT with NIKOLE   · 12/3/19: CT with NIKOLE   · 5/2020: CT NIKOLE    History of Present Illness:   Patient is a 62 y.o. female who had not had a screening colonoscopy developed constipation and subsequently BRBPR x 5-6 months. She wondered if this was secondary to L thyroxine. However symptoms continued to progress and she was referred to GI for further evaluation. Further investigations as above. Completed neoadjuvant chemoradiation and comes after LAR on 12/3/1/18 by Dr. Nikko Sidhu. She received one cycle of adjuvant FOLFOX and chose not to continue due to grade 2 nausea, grade 1 neuropathy, and concerns over QOL with CADD pump. She declined therapy with single agent Xeloda. Been on surveillance. She comes in today for follow-up with labs. She feels great. She has a great appetite. Denies nausea/vomiting, diarrhea/constipation, or bleeding. No aches or pain. No unexplained weight loss. No new lumps or bumps. No bleeding. She has 1 adopted daughter and supportive    Quit smoking in   Rare ETOH    No FH of cancer. Past Medical History:   Diagnosis Date    Asthma     SEASONAL ALLERGY INDUCED    GERD (gastroesophageal reflux disease)     Rectal cancer ([facility] [address] 2018    Diagnosed via colonoscopy on 2018. Staged as [id number]. Treated with neoadjuvant chemoradiation using Xeloda and 5040 cGy radiation and with the final radiation dose administered on 2018. Resected on 2018. Treated with adjuvant chemotherapy that was not well tolerated and that was discontinued on 3/22/2019.  Thyroid disease     hyperthyroidism      Past Surgical History:   Procedure Laterality Date    HX COLONOSCOPY  2018    Dr. Tracie Marmolejo.  HX COLONOSCOPY  2019    Normal post-resection anatomy; Dr. Tracie Marmolejo.  HX GI  2005    Laparoscopic cholecystectomy.  HX GI  2018    Hand-assisted laparoscopic low anterior resection, mobilization of the splenic flexure, creation of colonic J-pouch, coloanal anastomosis, and creation of loop ileostomy; Dr. Christina Evans.  HX HEENT      Sinus surgery.  HX ORTHOPAEDIC Right 1998    Right carpal tunnel release.  HX ORTHOPAEDIC  1982    Right ulnar nerve repositioning.  HX OTHER SURGICAL  2019    Ileostomy closure with resection and anastomosis; Dr. Christina Evans.     HX UROLOGICAL  2018    Cystoscopy and placement of temporary bilateral ureteral catheters; Greg Veliz MD.    IR INSERT TUNL CVC W PORT OVER 5 YEARS  2019    IR REMOVE TUNL CVAD W PORT/PUMP  2019      Social History     Tobacco Use    Smoking status: Former Smoker     Packs/day: 0.50     Years: 10.00     Pack years: 5.00     Last attempt to quit:      Years since quittin.7    Smokeless tobacco: Never Used   Substance Use Topics    Alcohol use: Yes     Comment: RARE ETOH      Family History   Problem Relation Age of Onset  Heart Disease Mother     Cancer Mother         CERVICAL    Hypertension Mother     Heart Disease Father     Hypertension Father     Heart Disease Brother     Anesth Problems Other      Current Outpatient Medications   Medication Sig    loratadine-pseudoephedrine (Claritin-D 24 Hour)  mg per tablet Take 1 Tab by mouth daily.  triamcinolone acetonide (KENALOG) 0.1 % topical cream JESSICA EXT AA BID . MIX SMALL AMOUNT IN HAND WITH NYSTATIN    nystatin (MYCOSTATIN) topical cream JESSICA EXT AA BID . MIX SMALL AMOUNT IN HAND WITH TRIAM    ondansetron (ZOFRAN ODT) 4 mg disintegrating tablet Take 1 Tab by mouth every six (6) hours as needed for Nausea.  terconazole (TERAZOL 7) 0.4 % vaginal cream INSERT 1 APPLICATORFUL IN VAGINA EVERY EVENING FOR 7 DAYS IF NEEDED FOR YEAST INFECTION    methIMAzole (TAPAZOLE) 5 mg tablet Take 2.5 mg by mouth nightly. No current facility-administered medications for this visit. No Known Allergies     Review of Systems: A complete review of systems was obtained, negative except as described above. Physical Exam:     ECOG PS: 0  General: No distress  Eyes: PERRL, anicteric sclerae  HENT: Atraumatic, OP clear  Neck: Supple  MS: Normal gait and station. Digits without clubbing or cyanosis. Skin: No rashes, ecchymoses, or petechiae. Normal temperature, turgor, and texture  Psych: Alert, oriented, appropriate affect, normal judgment/insight    Results:     Lab Results   Component Value Date/Time    WBC 5.8 09/10/2020 12:49 PM    HGB 13.1 09/10/2020 12:49 PM    HCT 41.5 09/10/2020 12:49 PM    PLATELET 234 57/15/1881 12:49 PM    MCV 83 09/10/2020 12:49 PM    ABS.  NEUTROPHILS 4.0 09/10/2020 12:49 PM     Lab Results   Component Value Date/Time    Sodium 140 09/10/2020 12:49 PM    Potassium 4.3 09/10/2020 12:49 PM    Chloride 103 09/10/2020 12:49 PM    CO2 24 09/10/2020 12:49 PM    Glucose 115 (H) 09/10/2020 12:49 PM    BUN 10 09/10/2020 12:49 PM    Creatinine 0.77 09/10/2020 12:49 PM    GFR est AA 98 09/10/2020 12:49 PM    GFR est non-AA 85 09/10/2020 12:49 PM    Calcium 9.4 09/10/2020 12:49 PM     Lab Results   Component Value Date/Time    Bilirubin, total 0.3 09/10/2020 12:49 PM    ALT (SGPT) 27 09/10/2020 12:49 PM    Alk. phosphatase 102 09/10/2020 12:49 PM    Protein, total 6.7 09/10/2020 12:49 PM    Albumin 4.3 09/10/2020 12:49 PM    Globulin 3.6 04/15/2019 09:53 AM     Records reviewed and summarized above. Pathology report(s) reviewed above. Radiology report(s) reviewed above. CT 6/19/19:  IMPRESSION:  No evidence for metastatic disease. CT 12/3/19: IMPRESSION:     1. CT of the chest demonstrates no definite evidence of metastatic disease. 2. CT of the abdomen and pelvis demonstrates hepatic steatosis.     There is no definite evidence of metastatic disease.     There is slight haziness in the small bowel mesentery adjacent to surgical clips  around a small bowel loop most likely postoperative changes.     There are few tiny presacral lymph nodes measuring 5 mm or less in size which  appears stable.     There is a ventral wall hernia to the right of midline containing fat. 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 She poorly tolerated cycle 1 with grade 2 nausea and vomiting and grade 1 neuropathy. Also CADD pump affected her QOL and she wishes to pursue a chemo treatment that does not involve a CADD. Decided to transition to CAPOX for 4 total cycles. She developed a grade 3 hypersensitivity reaction to Oxaliplatin  Symptoms lasted 2 weeks  She has declined further chemotherapy including single agent Xeloda    On Surveillance    Labs unremarkable. CEA WNL.    ROS reassuring  Scans 5/2020 reviewed personally and NIKOLE    Repeat colonoscopy completed 2020. Repeat due in a year. Will repeat imaging in 6 months x 2 additional scans then yearly thereafter for a total of 5 years, labs every 6 months and scans every year till year 5    2) Hypothyroidism  Controlled    3) Neuropathy  Resolved     4) Obesity  Reviewed life style changes  Healthy BMI    Plan:     · Continue surveillance  · CBC with diff, CMP, CEA every 3 months  · CT CAP in 3 months    RTC in 3 months with labs & scans     I appreciate the opportunity to participate in Ms. Silke Galeano's care.     Signed By: Celia Paredes MD

## 2020-12-07 ENCOUNTER — HOSPITAL ENCOUNTER (OUTPATIENT)
Dept: CT IMAGING | Age: 60
Discharge: HOME | End: 2020-12-07
Attending: REGISTERED NURSE
Payer: COMMERCIAL

## 2020-12-07 DIAGNOSIS — C20 RECTAL CANCER METASTASIZED TO INTRAPELVIC LYMPH NODE (HCC): ICD-10-CM

## 2020-12-07 DIAGNOSIS — C77.5 RECTAL CANCER METASTASIZED TO INTRAPELVIC LYMPH NODE (HCC): ICD-10-CM

## 2020-12-07 PROCEDURE — 71260 CT THORAX DX C+: CPT

## 2020-12-07 PROCEDURE — 74011000636 HC RX REV CODE- 636: Performed by: REGISTERED NURSE

## 2020-12-07 PROCEDURE — 74177 CT ABD & PELVIS W/CONTRAST: CPT

## 2020-12-07 RX ADMIN — IOPAMIDOL 100 ML: 755 INJECTION, SOLUTION INTRAVENOUS at 16:08

## 2020-12-08 LAB
ALBUMIN SERPL-MCNC: 4.2 G/DL (ref 3.8–4.9)
ALBUMIN/GLOB SERPL: 1.7 {RATIO} (ref 1.2–2.2)
ALP SERPL-CCNC: 93 IU/L (ref 39–117)
ALT SERPL-CCNC: 25 IU/L (ref 0–32)
AST SERPL-CCNC: 23 IU/L (ref 0–40)
BASOPHILS # BLD AUTO: 0 X10E3/UL (ref 0–0.2)
BASOPHILS NFR BLD AUTO: 0 %
BILIRUB SERPL-MCNC: 0.5 MG/DL (ref 0–1.2)
BUN SERPL-MCNC: 12 MG/DL (ref 6–24)
BUN/CREAT SERPL: 15 (ref 9–23)
CALCIUM SERPL-MCNC: 9.1 MG/DL (ref 8.7–10.2)
CEA SERPL-MCNC: 0.8 NG/ML (ref 0–4.7)
CHLORIDE SERPL-SCNC: 98 MMOL/L (ref 96–106)
CO2 SERPL-SCNC: 23 MMOL/L (ref 20–29)
CREAT SERPL-MCNC: 0.79 MG/DL (ref 0.57–1)
EOSINOPHIL # BLD AUTO: 0.1 X10E3/UL (ref 0–0.4)
EOSINOPHIL NFR BLD AUTO: 2 %
ERYTHROCYTE [DISTWIDTH] IN BLOOD BY AUTOMATED COUNT: 14.1 % (ref 11.7–15.4)
GLOBULIN SER CALC-MCNC: 2.5 G/DL (ref 1.5–4.5)
GLUCOSE SERPL-MCNC: 95 MG/DL (ref 65–99)
HCT VFR BLD AUTO: 42.3 % (ref 34–46.6)
HGB BLD-MCNC: 13.6 G/DL (ref 11.1–15.9)
IMM GRANULOCYTES # BLD AUTO: 0 X10E3/UL (ref 0–0.1)
IMM GRANULOCYTES NFR BLD AUTO: 0 %
LYMPHOCYTES # BLD AUTO: 1.2 X10E3/UL (ref 0.7–3.1)
LYMPHOCYTES NFR BLD AUTO: 21 %
MCH RBC QN AUTO: 26.5 PG (ref 26.6–33)
MCHC RBC AUTO-ENTMCNC: 32.2 G/DL (ref 31.5–35.7)
MCV RBC AUTO: 82 FL (ref 79–97)
MONOCYTES # BLD AUTO: 0.4 X10E3/UL (ref 0.1–0.9)
MONOCYTES NFR BLD AUTO: 6 %
NEUTROPHILS # BLD AUTO: 4.2 X10E3/UL (ref 1.4–7)
NEUTROPHILS NFR BLD AUTO: 71 %
PLATELET # BLD AUTO: 222 X10E3/UL (ref 150–450)
POTASSIUM SERPL-SCNC: 4.5 MMOL/L (ref 3.5–5.2)
PROT SERPL-MCNC: 6.7 G/DL (ref 6–8.5)
RBC # BLD AUTO: 5.14 X10E6/UL (ref 3.77–5.28)
SODIUM SERPL-SCNC: 137 MMOL/L (ref 134–144)
WBC # BLD AUTO: 5.9 X10E3/UL (ref 3.4–10.8)

## 2020-12-16 ENCOUNTER — VIRTUAL VISIT (OUTPATIENT)
Dept: ONCOLOGY | Age: 60
End: 2020-12-16
Payer: COMMERCIAL

## 2020-12-16 DIAGNOSIS — E66.9 CLASS 1 OBESITY WITH SERIOUS COMORBIDITY AND BODY MASS INDEX (BMI) OF 33.0 TO 33.9 IN ADULT, UNSPECIFIED OBESITY TYPE: ICD-10-CM

## 2020-12-16 DIAGNOSIS — E66.9 OBESITY (BMI 30.0-34.9): ICD-10-CM

## 2020-12-16 DIAGNOSIS — C18.9 MALIGNANT NEOPLASM OF COLON, UNSPECIFIED PART OF COLON (HCC): Primary | ICD-10-CM

## 2020-12-16 PROCEDURE — 99213 OFFICE O/P EST LOW 20 MIN: CPT | Performed by: INTERNAL MEDICINE

## 2020-12-16 NOTE — PROGRESS NOTES
Bertin Bernabe is a 62 y.o. female  Chief Complaint   Patient presents with    Follow-up     Rectal cancer     1. Have you been to the ER, urgent care clinic since your last visit? Hospitalized since your last visit? No    2. Have you seen or consulted any other health care providers outside of the [address] [place] [address] since your last visit? Include any pap smears or colon screening.  No

## 2020-12-16 NOTE — PROGRESS NOTES
[facility] at [address], [address] [place]: [phone]  F: [phone]    Reason for Visit:   Bernard Anderson is a 62 y.o. female who is seen on 12/16/2020 for follow up of  follow up for Rectal cancer. Seen by synchronous (real-time) audio-video technology on 12/16/2020    Treatment History:   · 6/29/18-colonoscopy was notable for a bleeding mass of malignant appearance in the mid rectum and distal rectum at a distance between 4 cm to 11 cm from the anus. Caused partial obstruction. · 7/6/18-CT chest abdomen pelvis showed rectal thickening, some perirectal stranding and several subcentimeter perirectal lymph nodes  · 7/11/18: Rectal ultrasound showed a partially circumferential mass seen in the distal rectum extending through the muscularis propria, 2 small lymph nodes about 6 mm in size adjacent to the mass. · 8/7/18~ 9/20/18-  Xeloda with Radiation  · 12/31/18: Hand-assisted laparoscopic low anterior resection- 0/17 nodes, no residual cancer and negative margins  · 2/6/19: adjuvant FOLFOX x 1 cycle  · 3/1/19: adjuvant CAPOX  · 6/19/19: CT with NIKOLE   · 12/3/19: CT with NIKOLE   · 5/2020: CT NIKOLE  · 12/2020: CT NIKOLE    History of Present Illness:   Patient is a 62 y.o. female who had not had a screening colonoscopy developed constipation and subsequently BRBPR x 5-6 months. She wondered if this was secondary to L thyroxine. However symptoms continued to progress and she was referred to GI for further evaluation. Further investigations as above. Completed neoadjuvant chemoradiation and comes after LAR on 12/3/1/18 by Dr. Lo James. She received one cycle of adjuvant FOLFOX and chose not to continue due to grade 2 nausea, grade 1 neuropathy, and concerns over QOL with CADD pump. She declined therapy with single agent Xeloda. Been on surveillance. She feels great. She has a great appetite. Denies nausea/vomiting, diarrhea/constipation, or bleeding. No aches or pain. No unexplained weight loss. No new lumps or bumps. No bleeding. Colonoscopy is next year     She has 1 adopted daughter and supportive    Quit smoking in   Rare ETOH    No FH of cancer. Past Medical History:   Diagnosis Date    Asthma     SEASONAL ALLERGY INDUCED    GERD (gastroesophageal reflux disease)     Rectal cancer ([facility] [address] 2018    Diagnosed via colonoscopy on 2018. Staged as [id number]. Treated with neoadjuvant chemoradiation using Xeloda and 5040 cGy radiation and with the final radiation dose administered on 2018. Resected on 2018. Treated with adjuvant chemotherapy that was not well tolerated and that was discontinued on 3/22/2019.  Thyroid disease     hyperthyroidism      Past Surgical History:   Procedure Laterality Date    HX COLONOSCOPY  2018    Dr. Ludie Merlin.  HX COLONOSCOPY  2019    Normal post-resection anatomy; Dr. Ludie Merlin.  HX GI  2005    Laparoscopic cholecystectomy.  HX GI  2018    Hand-assisted laparoscopic low anterior resection, mobilization of the splenic flexure, creation of colonic J-pouch, coloanal anastomosis, and creation of loop ileostomy; Dr. Jayesh Garibay.  HX HEENT  2015    Sinus surgery.  HX ORTHOPAEDIC Right 1998    Right carpal tunnel release.  HX ORTHOPAEDIC  1982    Right ulnar nerve repositioning.  HX OTHER SURGICAL  2019    Ileostomy closure with resection and anastomosis; Dr. Jayesh Garibay.     HX UROLOGICAL  2018    Cystoscopy and placement of temporary bilateral ureteral catheters; Amy Cadet MD.    IR INSERT TUNL CVC W PORT OVER 5 YEARS  2019    IR REMOVE TUNL CVAD W PORT/PUMP  2019      Social History     Tobacco Use    Smoking status: Former Smoker     Packs/day: 0.50     Years: 10.00     Pack years: 5.00     Quit date:      Years since quittin.9    Smokeless tobacco: Never Used   Substance Use Topics    Alcohol use: Yes     Comment: RARE ETOH      Family History   Problem Relation Age of Onset    Heart Disease Mother     Cancer Mother         CERVICAL    Hypertension Mother     Heart Disease Father     Hypertension Father     Heart Disease Brother     Anesth Problems Other      Current Outpatient Medications   Medication Sig    loratadine-pseudoephedrine (Claritin-D 24 Hour)  mg per tablet Take 1 Tab by mouth daily.  triamcinolone acetonide (KENALOG) 0.1 % topical cream JESSICA EXT AA BID . MIX SMALL AMOUNT IN HAND WITH NYSTATIN    nystatin (MYCOSTATIN) topical cream JESSICA EXT AA BID . MIX SMALL AMOUNT IN HAND WITH TRIAM    ondansetron (ZOFRAN ODT) 4 mg disintegrating tablet Take 1 Tab by mouth every six (6) hours as needed for Nausea.  methIMAzole (TAPAZOLE) 5 mg tablet Take 2.5 mg by mouth nightly.  terconazole (TERAZOL 7) 0.4 % vaginal cream INSERT 1 APPLICATORFUL IN VAGINA EVERY EVENING FOR 7 DAYS IF NEEDED FOR YEAST INFECTION     No current facility-administered medications for this visit. No Known Allergies     Review of Systems: A complete review of systems was obtained, negative except as described above. Physical Exam:       Due to this being a TeleHealth evaluation, many elements of the physical examination are unable to be assessed.      Constitutional: Appears well-developed and well-nourished in no apparent distress   Mental status: Alert and awake, Oriented to person/place/time, Able to follow commands  Eyes: EOM normal, Sclera normal, No visible ocular discharge  HENT: Normocephalic, atraumatic; Mouth/Throat: Moist mucous membranes, External Ears normal  Neck: No visualized mass  Pulmonary/Chest: Respiratory effort normal, No visualized signs of difficulty breathing or respiratory distress   Musculoskeletal: Normal gait with no signs of ataxia, Normal range of motion of neck  Neurological: No facial asymmetry (Cranial nerve 7 motor function), No gaze palsy  Skin: No significant exanthematous lesions or discoloration noted on facial skin  Psychiatric: Normal affect, normal judgment/insight. No hallucinations     Results:     Lab Results   Component Value Date/Time    WBC 5.9 12/07/2020 04:39 PM    HGB 13.6 12/07/2020 04:39 PM    HCT 42.3 12/07/2020 04:39 PM    PLATELET 378 21/57/8465 04:39 PM    MCV 82 12/07/2020 04:39 PM    ABS. NEUTROPHILS 4.2 12/07/2020 04:39 PM     Lab Results   Component Value Date/Time    Sodium 137 12/07/2020 04:39 PM    Potassium 4.5 12/07/2020 04:39 PM    Chloride 98 12/07/2020 04:39 PM    CO2 23 12/07/2020 04:39 PM    Glucose 95 12/07/2020 04:39 PM    BUN 12 12/07/2020 04:39 PM    Creatinine 0.79 12/07/2020 04:39 PM    GFR est AA 95 12/07/2020 04:39 PM    GFR est non-AA 82 12/07/2020 04:39 PM    Calcium 9.1 12/07/2020 04:39 PM     Lab Results   Component Value Date/Time    Bilirubin, total 0.5 12/07/2020 04:39 PM    ALT (SGPT) 25 12/07/2020 04:39 PM    Alk. phosphatase 93 12/07/2020 04:39 PM    Protein, total 6.7 12/07/2020 04:39 PM    Albumin 4.2 12/07/2020 04:39 PM    Globulin 3.6 04/15/2019 09:53 AM     CEA:  Recent Labs     12/07/20  1639 09/10/20  1249 05/20/20  1603   [id number] 0.8 0.7 0.6       Records reviewed and summarized above. Pathology report(s) reviewed above. Radiology report(s) reviewed above. CT 6/19/19:  IMPRESSION:  No evidence for metastatic disease. CT 12/2020    IMPRESSION  IMPRESSION:  No evidence of metastatic disease in the chest, abdomen, or pelvis. Status post  cholecystectomy. Stable postoperative changes rectosigmoid colon. Right ventral  hernia increased in size. Hepatic steatosis  .      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 Repeat due in a year. Will repeat imaging in 6 months x 2 additional scans then yearly thereafter for a total of 5 years, labs every 6 months     2) Hypothyroidism  Controlled    3) Neuropathy  Resolved     4) Obesity  Reviewed life style changes  Healthy BMI    Plan:     · Continue surveillance  · Colonoscopy as scheduled  · CBC with diff, CMP, CEA every 6 months  · CT CAP in 6 months    RTC in 6 months with labs & scans     I appreciate the opportunity to participate in Ms. Madhu Galeano's care. Signed By: Cecilia Byrne MD      I was in the office while conducting this encounter. The patient was at her home. Consent:  She and/or her healthcare decision maker is aware that this patient-initiated Telehealth encounter is a billable service, with coverage as determined by her insurance carrier. She is aware that she may receive a bill and has provided verbal consent to proceed: Yes    Pursuant to the emergency declaration under the [address] and the [facility], 1135 waiver authority and the [employer] and [employer] Act, this Virtual  Visit was conducted, with patient's (and/or legal guardian's) consent, to reduce the patient's risk of exposure to COVID-19 and provide necessary medical care. Services were provided through a video synchronous discussion virtually to substitute for in-person visit.

## 2021-04-13 ENCOUNTER — VIRTUAL VISIT (OUTPATIENT)
Dept: FAMILY MEDICINE CLINIC | Age: 61
End: 2021-04-13
Payer: COMMERCIAL

## 2021-04-13 DIAGNOSIS — J30.1 SEASONAL ALLERGIC RHINITIS DUE TO POLLEN: Primary | ICD-10-CM

## 2021-04-13 DIAGNOSIS — M62.830 SPASM OF MUSCLE OF LOWER BACK: ICD-10-CM

## 2021-04-13 PROCEDURE — 99214 OFFICE O/P EST MOD 30 MIN: CPT | Performed by: FAMILY MEDICINE

## 2021-04-13 RX ORDER — CHOLECALCIFEROL TAB 125 MCG (5000 UNIT) 125 MCG
5000 TAB ORAL DAILY
COMMUNITY

## 2021-04-13 RX ORDER — MONTELUKAST SODIUM 10 MG/1
10 TABLET ORAL DAILY
Qty: 90 TAB | Refills: 1 | Status: SHIPPED | OUTPATIENT
Start: 2021-04-13 | End: 2021-12-21 | Stop reason: SDUPTHER

## 2021-04-13 RX ORDER — FLUTICASONE PROPIONATE 50 MCG
2 SPRAY, SUSPENSION (ML) NASAL DAILY
Qty: 1 BOTTLE | Refills: 2 | Status: SHIPPED | OUTPATIENT
Start: 2021-04-13 | End: 2021-12-21 | Stop reason: SDUPTHER

## 2021-04-13 NOTE — PROGRESS NOTES
Cheko Navarrete is a 62 y.o. female who was seen by synchronous (real-time) audio-video technology on 4/13/2021 for Ear Fullness (Dizziness     Tried Claritin D     Started about 2 days ago     ) and Back Pain (Lower back pain     Tried Aleve, Rubs)  having also a lot of sinus drainage  Spring is always very bad. The last few days has gotten worse. Was also feeling tired on Sunday. Taking claritin D and that helps a little but in the pm it gets bad again    Also c/o right side back pain  The pain started a week ago and is lasting all day  This started 2018 after a long surgery  When she touches the back it feels sore in the muscles. Is worst in am        Assessment & Plan:   Diagnoses and all orders for this visit:    1. Seasonal allergic rhinitis due to pollen  -     montelukast (SINGULAIR) 10 mg tablet; Take 1 Tab by mouth daily. -     fluticasone propionate (FLONASE) 50 mcg/actuation nasal spray; 2 Sprays by Both Nostrils route daily. 2. Spasm of muscle of lower back  -     REFERRAL TO PHYSICAL THERAPY    using advil and tylenl prn and they help a little  Cont using these prn        Subjective:       Prior to Admission medications    Medication Sig Start Date End Date Taking? Authorizing Provider   cholecalciferol (VITAMIN D3) (5000 Units/125 mcg) tab tablet Take 5,000 Units by mouth daily. Yes Provider, Historical   montelukast (SINGULAIR) 10 mg tablet Take 1 Tab by mouth daily. 4/13/21  Yes Henri Navarro MD   fluticasone propionate (FLONASE) 50 mcg/actuation nasal spray 2 Sprays by Both Nostrils route daily. 4/13/21  Yes Henri Navarro MD   loratadine-pseudoephedrine (Claritin-D 24 Hour)  mg per tablet Take 1 Tab by mouth daily. 7/27/20  Yes Meena Davidson MD   triamcinolone acetonide (KENALOG) 0.1 % topical cream JESSICA EXT AA BID . MIX SMALL AMOUNT IN HAND WITH NYSTATIN 12/5/19  Yes Provider, Historical   nystatin (MYCOSTATIN) topical cream JESSICA EXT AA BID . MIX SMALL AMOUNT IN HAND WITH TRIAM 12/5/19  Yes Provider, Historical   methIMAzole (TAPAZOLE) 5 mg tablet Take 2.5 mg by mouth nightly. Yes Provider, Historical   ondansetron (ZOFRAN ODT) 4 mg disintegrating tablet Take 1 Tab by mouth every six (6) hours as needed for Nausea. 12/10/19   Anita Duron MD   terconazole (TERAZOL 7) 0.4 % vaginal cream INSERT 1 APPLICATORFUL IN VAGINA EVERY EVENING FOR 7 DAYS IF NEEDED FOR YEAST INFECTION 8/30/18   Provider, Historical     Patient Active Problem List    Diagnosis Date Noted    Malignant neoplasm of colon ([facility] 12/16/2020    Incisional hernia of anterior abdominal wall without obstruction or gangrene 12/09/2019    Hyperthyroidism 12/13/2018    Obesity (BMI 30.0-34.9) 12/13/2018    [facility]    Class 1 obesity with serious comorbidity and body mass index (BMI) of 33.0 to 33.9 in adult 07/17/2018    Mixed hyperlipidemia 03/24/2011     Current Outpatient Medications   Medication Sig Dispense Refill    cholecalciferol (VITAMIN D3) (5000 Units/125 mcg) tab tablet Take 5,000 Units by mouth daily.  montelukast (SINGULAIR) 10 mg tablet Take 1 Tab by mouth daily. 90 Tab 1    fluticasone propionate (FLONASE) 50 mcg/actuation nasal spray 2 Sprays by Both Nostrils route daily. 1 Bottle 2    loratadine-pseudoephedrine (Claritin-D 24 Hour)  mg per tablet Take 1 Tab by mouth daily. 30 Tab 2    triamcinolone acetonide (KENALOG) 0.1 % topical cream JESSICA EXT AA BID . MIX SMALL AMOUNT IN HAND WITH NYSTATIN      nystatin (MYCOSTATIN) topical cream JESSICA EXT AA BID . MIX SMALL AMOUNT IN HAND WITH TRIAM      methIMAzole (TAPAZOLE) 5 mg tablet Take 2.5 mg by mouth nightly.  ondansetron (ZOFRAN ODT) 4 mg disintegrating tablet Take 1 Tab by mouth every six (6) hours as needed for Nausea.  20 Tab 0    terconazole (TERAZOL 7) 0.4 % vaginal cream INSERT 1 APPLICATORFUL IN VAGINA EVERY EVENING FOR 7 DAYS IF NEEDED FOR YEAST INFECTION  0 ROS    Objective:     Patient-Reported Vitals 4/13/2021   Patient-Reported Weight 112   Patient-Reported Height 5'7\"        [INSTRUCTIONS:  \"[x]\" Indicates a positive item  \"[]\" Indicates a negative item  -- DELETE ALL ITEMS NOT EXAMINED]    Constitutional: [x] Appears well-developed and well-nourished [x] No apparent distress      [] Abnormal -     Mental status: [x] Alert and awake  [x] Oriented to person/place/time [x] Able to follow commands    [] Abnormal -     Eyes:   EOM    [x]  Normal    [] Abnormal -   Sclera  [x]  Normal    [] Abnormal -          Discharge [x]  None visible   [] Abnormal -     HENT: [x] Normocephalic, atraumatic  [] Abnormal -   [x] Mouth/Throat: Mucous membranes are moist    External Ears [x] Normal  [] Abnormal -    Neck: [x] No visualized mass [] Abnormal -     Pulmonary/Chest: [x] Respiratory effort normal   [x] No visualized signs of difficulty breathing or respiratory distress        [] Abnormal -      Musculoskeletal:   [x] Normal gait with no signs of ataxia         [x] Normal range of motion of neck        [] Abnormal -     Neurological:        [x] No Facial Asymmetry (Cranial nerve 7 motor function) (limited exam due to video visit)          [x] No gaze palsy        [] Abnormal -          Skin:        [x] No significant exanthematous lesions or discoloration noted on facial skin         [] Abnormal -            Psychiatric:       [x] Normal Affect [] Abnormal -        [x] No Hallucinations    Other pertinent observable physical exam findings:-        We discussed the expected course, resolution and complications of the diagnosis(es) in detail. Medication risks, benefits, costs, interactions, and alternatives were discussed as indicated. I advised her to contact the office if her condition worsens, changes or fails to improve as anticipated. She expressed understanding with the diagnosis(es) and plan.        Margo Rock, was evaluated through a synchronous (real-time) audio-video encounter. The patient (or guardian if applicable) is aware that this is a billable service. Verbal consent to proceed has been obtained within the past 12 months. The visit was conducted pursuant to the emergency declaration under the [address] [place] [address], [address] [place] [address] authority and the [employer] and [employer] Act. Patient identification was verified, and a caregiver was present when appropriate. The patient was located in a state where the provider was credentialed to provide care.       Kaitlynn Cuellar MD

## 2021-04-13 NOTE — PROGRESS NOTES
Patient stated name &   Chief Complaint   Patient presents with    Ear Fullness     Dizziness     Tried Claritin D     Started about 2 days ago         Back Pain     Lower back pain     Tried AleMarilynn bradley        Health Maintenance Due   Topic    Hepatitis C Screening     Pneumococcal 0-64 years (1 of 3 - PCV13)    COVID-19 Vaccine (1)    DTaP/Tdap/Td series (1 - Tdap)    PAP AKA CERVICAL CYTOLOGY     Lipid Screen     Shingrix Vaccine Age 49> (1 of 2)    Breast Cancer Screen Mammogram        Wt Readings from Last 3 Encounters:   09/15/20 211 lb 9.6 oz (96 kg)   20 217 lb (98.4 kg)   12/10/19 211 lb (95.7 kg)     Temp Readings from Last 3 Encounters:   09/15/20 97.2 °F (36.2 °C)   20 98.1 °F (36.7 °C) (Oral)   12/10/19 97.5 °F (36.4 °C)     BP Readings from Last 3 Encounters:   09/15/20 (!) 149/85   20 131/80   12/10/19 110/71     Pulse Readings from Last 3 Encounters:   09/15/20 91   20 85   12/10/19 82         Learning Assessment:  :     Learning Assessment 2018   PRIMARY LEARNER Patient   PRIMARY LANGUAGE ENGLISH   LEARNER PREFERENCE PRIMARY DEMONSTRATION     READING   ANSWERED BY patient   RELATIONSHIP SELF       Depression Screening:  :     3 most recent PHQ Screens 2020   Little interest or pleasure in doing things Not at all   Feeling down, depressed, irritable, or hopeless Not at all   Total Score PHQ 2 0       Fall Risk Assessment:  :     Fall Risk Assessment, last 12 mths 2019   Able to walk? Yes   Fall in past 12 months? No       Abuse Screening:  :     Abuse Screening Questionnaire 2019   Do you ever feel afraid of your partner? N N   Are you in a relationship with someone who physically or mentally threatens you? N N   Is it safe for you to go home? Y Y       Coordination of Care Questionnaire:  :     1) Have you been to an emergency room, urgent care clinic since your last visit? No    Hospitalized since your last visit?  No             2) Have you seen or consulted any other health care providers outside of [address] [place] [address] since your last visit? No  (Include any pap smears or colon screenings in this section.)    Patient is accompanied by VV I have received verbal consent from Scott John to discuss any/all medical information while they are present in the room.

## 2021-04-15 RX ORDER — LORATADINE AND PSEUDOEPHEDRINE SULFATE 10; 240 MG/1; MG/1
TABLET, EXTENDED RELEASE ORAL
Qty: 30 TAB | Refills: 2 | Status: SHIPPED | OUTPATIENT
Start: 2021-04-15 | End: 2021-04-22

## 2021-04-22 ENCOUNTER — TELEPHONE (OUTPATIENT)
Dept: FAMILY MEDICINE CLINIC | Age: 61
End: 2021-04-22

## 2021-04-22 RX ORDER — LORATADINE AND PSEUDOEPHEDRINE SULFATE 10; 240 MG/1; MG/1
TABLET, EXTENDED RELEASE ORAL
Qty: 30 TAB | Refills: 2 | Status: SHIPPED | OUTPATIENT
Start: 2021-04-22 | End: 2021-08-26

## 2021-05-25 ENCOUNTER — HOSPITAL ENCOUNTER (OUTPATIENT)
Dept: CT IMAGING | Age: 61
Discharge: HOME | End: 2021-05-25
Attending: INTERNAL MEDICINE
Payer: COMMERCIAL

## 2021-05-25 DIAGNOSIS — C18.9 MALIGNANT NEOPLASM OF COLON, UNSPECIFIED PART OF COLON (HCC): ICD-10-CM

## 2021-05-25 PROCEDURE — 74011000636 HC RX REV CODE- 636: Performed by: RADIOLOGY

## 2021-05-25 PROCEDURE — 74177 CT ABD & PELVIS W/CONTRAST: CPT

## 2021-05-25 RX ADMIN — IOPAMIDOL 100 ML: 755 INJECTION, SOLUTION INTRAVENOUS at 15:57

## 2021-05-26 LAB
ALBUMIN SERPL-MCNC: 4.2 G/DL (ref 3.8–4.9)
ALBUMIN/GLOB SERPL: 1.4 {RATIO} (ref 1.2–2.2)
ALP SERPL-CCNC: 105 IU/L (ref 48–121)
ALT SERPL-CCNC: 30 IU/L (ref 0–32)
AST SERPL-CCNC: 19 IU/L (ref 0–40)
BASOPHILS # BLD AUTO: 0 X10E3/UL (ref 0–0.2)
BASOPHILS NFR BLD AUTO: 0 %
BILIRUB SERPL-MCNC: 0.4 MG/DL (ref 0–1.2)
BUN SERPL-MCNC: 11 MG/DL (ref 8–27)
BUN/CREAT SERPL: 14 (ref 12–28)
CALCIUM SERPL-MCNC: 9.5 MG/DL (ref 8.7–10.3)
CEA SERPL-MCNC: 0.9 NG/ML (ref 0–4.7)
CHLORIDE SERPL-SCNC: 100 MMOL/L (ref 96–106)
CO2 SERPL-SCNC: 22 MMOL/L (ref 20–29)
CREAT SERPL-MCNC: 0.78 MG/DL (ref 0.57–1)
EOSINOPHIL # BLD AUTO: 0.1 X10E3/UL (ref 0–0.4)
EOSINOPHIL NFR BLD AUTO: 2 %
ERYTHROCYTE [DISTWIDTH] IN BLOOD BY AUTOMATED COUNT: 14.3 % (ref 11.7–15.4)
GLOBULIN SER CALC-MCNC: 2.9 G/DL (ref 1.5–4.5)
GLUCOSE SERPL-MCNC: 93 MG/DL (ref 65–99)
HCT VFR BLD AUTO: 41.7 % (ref 34–46.6)
HGB BLD-MCNC: 13.4 G/DL (ref 11.1–15.9)
IMM GRANULOCYTES # BLD AUTO: 0 X10E3/UL (ref 0–0.1)
IMM GRANULOCYTES NFR BLD AUTO: 0 %
LYMPHOCYTES # BLD AUTO: 1.6 X10E3/UL (ref 0.7–3.1)
LYMPHOCYTES NFR BLD AUTO: 24 %
MCH RBC QN AUTO: 27.1 PG (ref 26.6–33)
MCHC RBC AUTO-ENTMCNC: 32.1 G/DL (ref 31.5–35.7)
MCV RBC AUTO: 84 FL (ref 79–97)
MONOCYTES # BLD AUTO: 0.3 X10E3/UL (ref 0.1–0.9)
MONOCYTES NFR BLD AUTO: 5 %
NEUTROPHILS # BLD AUTO: 4.6 X10E3/UL (ref 1.4–7)
NEUTROPHILS NFR BLD AUTO: 69 %
PLATELET # BLD AUTO: 226 X10E3/UL (ref 150–450)
POTASSIUM SERPL-SCNC: 4.4 MMOL/L (ref 3.5–5.2)
PROT SERPL-MCNC: 7.1 G/DL (ref 6–8.5)
RBC # BLD AUTO: 4.95 X10E6/UL (ref 3.77–5.28)
SODIUM SERPL-SCNC: 140 MMOL/L (ref 134–144)
WBC # BLD AUTO: 6.7 X10E3/UL (ref 3.4–10.8)

## 2021-06-01 ENCOUNTER — VIRTUAL VISIT (OUTPATIENT)
Dept: ONCOLOGY | Age: 61
End: 2021-06-01
Payer: COMMERCIAL

## 2021-06-01 DIAGNOSIS — E66.9 CLASS 1 OBESITY WITH SERIOUS COMORBIDITY AND BODY MASS INDEX (BMI) OF 33.0 TO 33.9 IN ADULT, UNSPECIFIED OBESITY TYPE: ICD-10-CM

## 2021-06-01 DIAGNOSIS — C20 RECTAL CANCER (HCC): Primary | ICD-10-CM

## 2021-06-01 PROCEDURE — 99213 OFFICE O/P EST LOW 20 MIN: CPT | Performed by: INTERNAL MEDICINE

## 2021-06-01 NOTE — PROGRESS NOTES
[facility] [place] at [address], [address] [place]: [phone]  F: [phone]    Reason for Visit:   Lucille Pulido is a 62 y.o. female who is seen on 6/1/2021 for follow up of  follow up for Rectal cancer. Seen by synchronous (real-time) audio-video technology on 6/1/2021    Treatment History:   · 6/29/18-colonoscopy was notable for a bleeding mass of malignant appearance in the mid rectum and distal rectum at a distance between 4 cm to 11 cm from the anus. Caused partial obstruction. · 7/6/18-CT chest abdomen pelvis showed rectal thickening, some perirectal stranding and several subcentimeter perirectal lymph nodes  · 7/11/18: Rectal ultrasound showed a partially circumferential mass seen in the distal rectum extending through the muscularis propria, 2 small lymph nodes about 6 mm in size adjacent to the mass. · 8/7/18~ 9/20/18-  Xeloda with Radiation  · 12/31/18: Hand-assisted laparoscopic low anterior resection- 0/17 nodes, no residual cancer and negative margins  · 2/6/19: adjuvant FOLFOX x 1 cycle  · 3/1/19: adjuvant CAPOX  · 6/19/19: CT with NIKOLE   · 12/3/19: CT with NIKOLE   · 5/2020: CT NIKOLE  · 12/2020: CT NIKOLE  · 5/2021: CT NIKOLE     History of Present Illness:   Patient is a 62 y.o. female who had not had a screening colonoscopy developed constipation and subsequently BRBPR x 5-6 months. She wondered if this was secondary to L thyroxine. However symptoms continued to progress and she was referred to GI for further evaluation. Further investigations as above. Completed neoadjuvant chemoradiation and comes after LAR on 12/3/1/18 by Dr. Sahra Duran. She received one cycle of adjuvant FOLFOX and chose not to continue due to grade 2 nausea, grade 1 neuropathy, and concerns over QOL with CADD pump. She declined therapy with single agent Xeloda. Been on surveillance. She feels great. She has a great appetite.  Denies nausea/vomiting, diarrhea/constipation, or bleeding. No aches or pain. No unexplained weight loss. No new lumps or bumps. No bleeding. [place]   Colonoscopy this year    She has 1 adopted daughter and supportive    Quit smoking in   Rare ETOH    No FH of cancer. Past Medical History:   Diagnosis Date    Asthma     SEASONAL ALLERGY INDUCED    GERD (gastroesophageal reflux disease)     Rectal cancer ([facility] [address] 2018    Diagnosed via colonoscopy on 2018. Staged as [id number]. Treated with neoadjuvant chemoradiation using Xeloda and 5040 cGy radiation and with the final radiation dose administered on 2018. Resected on 2018. Treated with adjuvant chemotherapy that was not well tolerated and that was discontinued on 3/22/2019.  Thyroid disease     hyperthyroidism      Past Surgical History:   Procedure Laterality Date    HX COLONOSCOPY  2018    Dr. Anna Torres.  HX COLONOSCOPY  2019    Normal post-resection anatomy; Dr. Anna Torres.  HX GI  2005    Laparoscopic cholecystectomy.  HX GI  2018    Hand-assisted laparoscopic low anterior resection, mobilization of the splenic flexure, creation of colonic J-pouch, coloanal anastomosis, and creation of loop ileostomy; Dr. Dorothea Lopez.  HX HEENT  2015    Sinus surgery.  HX ORTHOPAEDIC Right 1998    Right carpal tunnel release.  HX ORTHOPAEDIC  1982    Right ulnar nerve repositioning.  HX OTHER SURGICAL  2019    Ileostomy closure with resection and anastomosis; Dr. Dorothea Lopez.     HX UROLOGICAL  2018    Cystoscopy and placement of temporary bilateral ureteral catheters; Abby Niño MD.    IR INSERT TUNL CVC W PORT OVER 5 YEARS  2019    IR REMOVE TUNL CVAD W PORT/PUMP  2019      Social History     Tobacco Use    Smoking status: Former Smoker     Packs/day: 0.50     Years: 10.00     Pack years: 5.00     Quit date:      Years since quittin.4    Smokeless tobacco: Never Used Substance Use Topics    Alcohol use: Yes     Comment: RARE ETOH      Family History   Problem Relation Age of Onset    Heart Disease Mother     Cancer Mother         CERVICAL    Hypertension Mother     Heart Disease Father     Hypertension Father     Heart Disease Brother     Anesth Problems Other      Current Outpatient Medications   Medication Sig    Claritin-D 24 Hour  mg per tablet TAKE 1 TABLET BY MOUTH DAILY    cholecalciferol (VITAMIN D3) (5000 Units/125 mcg) tab tablet Take 5,000 Units by mouth daily.  montelukast (SINGULAIR) 10 mg tablet Take 1 Tab by mouth daily.  fluticasone propionate (FLONASE) 50 mcg/actuation nasal spray 2 Sprays by Both Nostrils route daily.  triamcinolone acetonide (KENALOG) 0.1 % topical cream JESSICA EXT AA BID . MIX SMALL AMOUNT IN HAND WITH NYSTATIN    nystatin (MYCOSTATIN) topical cream JESSICA EXT AA BID . MIX SMALL AMOUNT IN HAND WITH TRIAM    terconazole (TERAZOL 7) 0.4 % vaginal cream INSERT 1 APPLICATORFUL IN VAGINA EVERY EVENING FOR 7 DAYS IF NEEDED FOR YEAST INFECTION    methIMAzole (TAPAZOLE) 5 mg tablet Take 2.5 mg by mouth nightly.  ondansetron (ZOFRAN ODT) 4 mg disintegrating tablet Take 1 Tab by mouth every six (6) hours as needed for Nausea. No current facility-administered medications for this visit. No Known Allergies     Review of Systems: A complete review of systems was obtained, negative except as described above. Physical Exam:       Due to this being a TeleHealth evaluation, many elements of the physical examination are unable to be assessed. Constitutional: Appears well-developed and well-nourished in no apparent distress   Mental status: Alert and awake, Oriented to person/place/time, Able to follow commands  Eyes: EOM normal, Sclera normal, No visible ocular discharge  Skin: No significant exanthematous lesions or discoloration noted on facial skin  Psychiatric: Normal affect, normal judgment/insight.  No hallucinations     Results:     Lab Results   Component Value Date/Time    WBC 6.7 05/25/2021 02:53 PM    HGB 13.4 05/25/2021 02:53 PM    HCT 41.7 05/25/2021 02:53 PM    PLATELET 092 49/74/3076 02:53 PM    MCV 84 05/25/2021 02:53 PM    ABS. NEUTROPHILS 4.6 05/25/2021 02:53 PM     Lab Results   Component Value Date/Time    Sodium 140 05/25/2021 02:53 PM    Potassium 4.4 05/25/2021 02:53 PM    Chloride 100 05/25/2021 02:53 PM    CO2 22 05/25/2021 02:53 PM    Glucose 93 05/25/2021 02:53 PM    BUN 11 05/25/2021 02:53 PM    Creatinine 0.78 05/25/2021 02:53 PM    GFR est AA 96 05/25/2021 02:53 PM    GFR est non-AA 83 05/25/2021 02:53 PM    Calcium 9.5 05/25/2021 02:53 PM     Lab Results   Component Value Date/Time    Bilirubin, total 0.4 05/25/2021 02:53 PM    ALT (SGPT) 30 05/25/2021 02:53 PM    Alk. phosphatase 105 05/25/2021 02:53 PM    Protein, total 7.1 05/25/2021 02:53 PM    Albumin 4.2 05/25/2021 02:53 PM    Globulin 3.6 04/15/2019 09:53 AM     CEA:  Recent Labs     05/25/21  1453 12/07/20  1639 09/10/20  1249   [id number] 0.9 0.8 0.7       Records reviewed and summarized above. Pathology report(s) reviewed above. Radiology report(s) reviewed above. CT 6/19/19:  IMPRESSION:  No evidence for metastatic disease. CT 12/2020    IMPRESSION  IMPRESSION:  No evidence of metastatic disease in the chest, abdomen, or pelvis. Status post  cholecystectomy. Stable postoperative changes rectosigmoid colon. Right ventral  hernia increased in size. Hepatic steatosis    CT 5/25/2021  IMPRESSION  No evidence of metastatic disease in the abdomen or pelvis. Postoperative  changes in the rectosigmoid colon, as well as partial small bowel resection. Slight interval increase in size of the fat-containing right ventral hernia.     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 Repeat due in a year. Will repeat imaging in 6 months and then switch to annual scans till year 5    2) Hypothyroidism  Controlled    3) Neuropathy  Resolved     4) Obesity  Reviewed life style changes  Healthy BMI    Plan:     · Continue surveillance  · Colonoscopy as scheduled  · CBC with diff, CMP, CEA every 6 months  · CT CAP in 6 months    RTC in 6 months with labs & scans     I appreciate the opportunity to participate in Ms. Shelley Galeano's care. Signed By: Nadja Gallagher MD      The patient was evaluated through a synchronous (real-time) audio-video encounter. The patient (or guardian if applicable) is aware that this is a billable service. Verbal consent to proceed has been obtained within the past 12 months. The visit was conducted pursuant to the emergency declaration under the [facility], [address] [place] [address] authority and the [employer] General Act. Patient identification was verified, and a caregiver was present when appropriate. The patient was located in a state where the provider was credentialed to provide care.

## 2021-06-01 NOTE — PROGRESS NOTES
Stevie Reed is a 62 y.o. female  Chief Complaint   Patient presents with    Follow-up     Rectal cancer     1. Have you been to the ER, urgent care clinic since your last visit? Hospitalized since your last visit? No    2. Have you seen or consulted any other health care providers outside of the [address] [place] [address] since your last visit? Include any pap smears or colon screening.  No

## 2021-10-14 ENCOUNTER — TELEPHONE (OUTPATIENT)
Dept: ONCOLOGY | Age: 61
End: 2021-10-14

## 2021-10-14 NOTE — TELEPHONE ENCOUNTER
Called pt to reschedule 12/1 apt as Dr Power Lira will be out of office, reschedule 4 weeks out per NP Viraj. LVM.

## 2021-10-20 DIAGNOSIS — C20 RECTAL CANCER (HCC): Primary | ICD-10-CM

## 2021-11-09 DIAGNOSIS — C20 RECTAL CANCER (HCC): Primary | ICD-10-CM

## 2021-11-09 RX ORDER — SCOLOPAMINE TRANSDERMAL SYSTEM 1 MG/1
1 PATCH, EXTENDED RELEASE TRANSDERMAL
Qty: 4 PATCH | Refills: 0 | Status: SHIPPED | OUTPATIENT
Start: 2021-11-09 | End: 2021-11-19

## 2021-11-28 DIAGNOSIS — J30.1 SEASONAL ALLERGIC RHINITIS DUE TO POLLEN: ICD-10-CM

## 2021-11-30 ENCOUNTER — HOSPITAL ENCOUNTER (OUTPATIENT)
Dept: CT IMAGING | Age: 61
Discharge: HOME | End: 2021-11-30
Attending: NURSE PRACTITIONER
Payer: COMMERCIAL

## 2021-11-30 DIAGNOSIS — C20 RECTAL CANCER (HCC): ICD-10-CM

## 2021-11-30 PROCEDURE — 74177 CT ABD & PELVIS W/CONTRAST: CPT

## 2021-11-30 PROCEDURE — 74011000636 HC RX REV CODE- 636: Performed by: RADIOLOGY

## 2021-11-30 RX ADMIN — IOPAMIDOL 100 ML: 755 INJECTION, SOLUTION INTRAVENOUS at 15:28

## 2021-12-01 LAB
ALBUMIN SERPL-MCNC: 4.5 G/DL (ref 3.8–4.9)
ALBUMIN/GLOB SERPL: 1.6 {RATIO} (ref 1.2–2.2)
ALP SERPL-CCNC: 99 IU/L (ref 44–121)
ALT SERPL-CCNC: 24 IU/L (ref 0–32)
AST SERPL-CCNC: 14 IU/L (ref 0–40)
BASOPHILS # BLD AUTO: 0 X10E3/UL (ref 0–0.2)
BASOPHILS NFR BLD AUTO: 1 %
BILIRUB SERPL-MCNC: 0.4 MG/DL (ref 0–1.2)
BUN SERPL-MCNC: 13 MG/DL (ref 8–27)
BUN/CREAT SERPL: 17 (ref 12–28)
CALCIUM SERPL-MCNC: 9.2 MG/DL (ref 8.7–10.3)
CEA SERPL-MCNC: 0.6 NG/ML (ref 0–4.7)
CHLORIDE SERPL-SCNC: 103 MMOL/L (ref 96–106)
CO2 SERPL-SCNC: 22 MMOL/L (ref 20–29)
CREAT SERPL-MCNC: 0.77 MG/DL (ref 0.57–1)
EOSINOPHIL # BLD AUTO: 0.1 X10E3/UL (ref 0–0.4)
EOSINOPHIL NFR BLD AUTO: 2 %
ERYTHROCYTE [DISTWIDTH] IN BLOOD BY AUTOMATED COUNT: 14.2 % (ref 11.7–15.4)
GLOBULIN SER CALC-MCNC: 2.9 G/DL (ref 1.5–4.5)
GLUCOSE SERPL-MCNC: 84 MG/DL (ref 65–99)
HCT VFR BLD AUTO: 42 % (ref 34–46.6)
HGB BLD-MCNC: 13.7 G/DL (ref 11.1–15.9)
IMM GRANULOCYTES # BLD AUTO: 0 X10E3/UL (ref 0–0.1)
IMM GRANULOCYTES NFR BLD AUTO: 0 %
LYMPHOCYTES # BLD AUTO: 1.5 X10E3/UL (ref 0.7–3.1)
LYMPHOCYTES NFR BLD AUTO: 23 %
MCH RBC QN AUTO: 27 PG (ref 26.6–33)
MCHC RBC AUTO-ENTMCNC: 32.6 G/DL (ref 31.5–35.7)
MCV RBC AUTO: 83 FL (ref 79–97)
MONOCYTES # BLD AUTO: 0.4 X10E3/UL (ref 0.1–0.9)
MONOCYTES NFR BLD AUTO: 5 %
NEUTROPHILS # BLD AUTO: 4.5 X10E3/UL (ref 1.4–7)
NEUTROPHILS NFR BLD AUTO: 69 %
PLATELET # BLD AUTO: 239 X10E3/UL (ref 150–450)
POTASSIUM SERPL-SCNC: 4.4 MMOL/L (ref 3.5–5.2)
PROT SERPL-MCNC: 7.4 G/DL (ref 6–8.5)
RBC # BLD AUTO: 5.08 X10E6/UL (ref 3.77–5.28)
SODIUM SERPL-SCNC: 141 MMOL/L (ref 134–144)
WBC # BLD AUTO: 6.5 X10E3/UL (ref 3.4–10.8)

## 2021-12-06 ENCOUNTER — TELEPHONE (OUTPATIENT)
Dept: ONCOLOGY | Age: 61
End: 2021-12-06

## 2021-12-06 NOTE — TELEPHONE ENCOUNTER
LVM that we had moved her appt up on 12/14 to 1:45 it is still virtual.  If their were any issues to give our office a call.

## 2021-12-14 ENCOUNTER — VIRTUAL VISIT (OUTPATIENT)
Dept: ONCOLOGY | Age: 61
End: 2021-12-14
Payer: COMMERCIAL

## 2021-12-14 DIAGNOSIS — C20 RECTAL CANCER (HCC): ICD-10-CM

## 2021-12-14 DIAGNOSIS — E66.9 CLASS 1 OBESITY WITH SERIOUS COMORBIDITY AND BODY MASS INDEX (BMI) OF 33.0 TO 33.9 IN ADULT, UNSPECIFIED OBESITY TYPE: Primary | ICD-10-CM

## 2021-12-14 PROCEDURE — 99214 OFFICE O/P EST MOD 30 MIN: CPT | Performed by: INTERNAL MEDICINE

## 2021-12-14 NOTE — PROGRESS NOTES
[facility] [place] at [address], [address] [place]: [phone]  F: [phone]    Reason for Visit:   Khris Andrade is a 62 y.o. female who is seen on 12/14/2021 for follow up of  follow up for Rectal cancer. Seen by synchronous (real-time) audio-video technology on 12/14/2021    Treatment History:   · 6/29/18-colonoscopy was notable for a bleeding mass of malignant appearance in the mid rectum and distal rectum at a distance between 4 cm to 11 cm from the anus. Caused partial obstruction. · 7/6/18-CT chest abdomen pelvis showed rectal thickening, some perirectal stranding and several subcentimeter perirectal lymph nodes  · 7/11/18: Rectal ultrasound showed a partially circumferential mass seen in the distal rectum extending through the muscularis propria, 2 small lymph nodes about 6 mm in size adjacent to the mass. · 8/7/18~ 9/20/18-  Xeloda with Radiation  · 12/31/18: Hand-assisted laparoscopic low anterior resection- 0/17 nodes, no residual cancer and negative margins  · 2/6/19: adjuvant FOLFOX x 1 cycle  · 3/1/19: adjuvant CAPOX  · 6/19/19: CT with NIKOLE   · 12/3/19: CT with NIKOLE   · 5/2020: CT NIKOLE  · 12/2020: CT NIKOLE  · 5/2021: CT NIKOLE   · 11/2021: CT NIKOLE    History of Present Illness:   Patient is a 62 y.o. female who had not had a screening colonoscopy developed constipation and subsequently BRBPR x 5-6 months. She wondered if this was secondary to L thyroxine. However symptoms continued to progress and she was referred to GI for further evaluation. Further investigations as above. Completed neoadjuvant chemoradiation and comes after LAR on 12/3/1/18 by Dr. Wesley Alcantar. She received one cycle of adjuvant FOLFOX and chose not to continue due to grade 2 nausea, grade 1 neuropathy, and concerns over QOL with CADD pump. She declined therapy with single agent Xeloda. Been on surveillance.     She feels well, no pain, bleeding  Colonoscopy JAN 2021    She has 1 adopted daughter and supportive    Quit smoking in   Rare ETOH    No FH of cancer. Past Medical History:   Diagnosis Date    Asthma     SEASONAL ALLERGY INDUCED    GERD (gastroesophageal reflux disease)     Rectal cancer ([facility] [address] 2018    Diagnosed via colonoscopy on 2018. Staged as [id number]. Treated with neoadjuvant chemoradiation using Xeloda and 5040 cGy radiation and with the final radiation dose administered on 2018. Resected on 2018. Treated with adjuvant chemotherapy that was not well tolerated and that was discontinued on 3/22/2019.  Thyroid disease     hyperthyroidism      Past Surgical History:   Procedure Laterality Date    HX COLONOSCOPY  2018    Dr. Harleen Escalante.  HX COLONOSCOPY  2019    Normal post-resection anatomy; Dr. Harleen Escalante.  HX GI      Laparoscopic cholecystectomy.  HX GI  2018    Hand-assisted laparoscopic low anterior resection, mobilization of the splenic flexure, creation of colonic J-pouch, coloanal anastomosis, and creation of loop ileostomy; Dr. Sean Pino.  HX HEENT      Sinus surgery.  HX ORTHOPAEDIC Right 1998    Right carpal tunnel release.  HX ORTHOPAEDIC  1982    Right ulnar nerve repositioning.  HX OTHER SURGICAL  2019    Ileostomy closure with resection and anastomosis; Dr. Sean Pino.     HX UROLOGICAL  2018    Cystoscopy and placement of temporary bilateral ureteral catheters; Gisele Lyons MD.    IR INSERT TUNL CVC W PORT OVER 5 YEARS  2019    IR REMOVE TUNL CVAD W PORT/PUMP  2019      Social History     Tobacco Use    Smoking status: Former Smoker     Packs/day: 0.50     Years: 10.00     Pack years: 5.00     Quit date:      Years since quittin.9    Smokeless tobacco: Never Used   Substance Use Topics    Alcohol use: Yes     Comment: RARE ETOH      Family History   Problem Relation Age of Onset    Heart Disease Mother     Cancer Mother CERVICAL    Hypertension Mother     Heart Disease Father     Hypertension Father     Heart Disease Brother     Anesth Problems Other      Current Outpatient Medications   Medication Sig    Claritin-D 24 Hour  mg per tablet TAKE 1 TABLET BY MOUTH DAILY    cholecalciferol (VITAMIN D3) (5000 Units/125 mcg) tab tablet Take 5,000 Units by mouth daily.  montelukast (SINGULAIR) 10 mg tablet Take 1 Tab by mouth daily.  fluticasone propionate (FLONASE) 50 mcg/actuation nasal spray 2 Sprays by Both Nostrils route daily.  triamcinolone acetonide (KENALOG) 0.1 % topical cream JESSICA EXT AA BID . MIX SMALL AMOUNT IN HAND WITH NYSTATIN    nystatin (MYCOSTATIN) topical cream JESSICA EXT AA BID . MIX SMALL AMOUNT IN HAND WITH TRIAM    terconazole (TERAZOL 7) 0.4 % vaginal cream INSERT 1 APPLICATORFUL IN VAGINA EVERY EVENING FOR 7 DAYS IF NEEDED FOR YEAST INFECTION    methIMAzole (TAPAZOLE) 5 mg tablet Take 2.5 mg by mouth nightly.  ondansetron (ZOFRAN ODT) 4 mg disintegrating tablet Take 1 Tab by mouth every six (6) hours as needed for Nausea. No current facility-administered medications for this visit. No Known Allergies     Review of Systems: A complete review of systems was obtained, negative except as described above. Physical Exam:       Due to this being a TeleHealth evaluation, many elements of the physical examination are unable to be assessed. Constitutional: Appears well-developed and well-nourished in no apparent distress   Mental status: Alert and awake, Oriented to person/place/time, Able to follow commands  Eyes: EOM normal, Sclera normal, No visible ocular discharge  Skin: No significant exanthematous lesions or discoloration noted on facial skin  Psychiatric: Normal affect, normal judgment/insight.  No hallucinations     Results:     Lab Results   Component Value Date/Time    WBC 6.5 11/30/2021 02:22 PM    HGB 13.7 11/30/2021 02:22 PM    HCT 42.0 11/30/2021 02:22 PM    PLATELET 960 11/30/2021 02:22 PM    MCV 83 11/30/2021 02:22 PM    ABS. NEUTROPHILS 4.5 11/30/2021 02:22 PM     Lab Results   Component Value Date/Time    Sodium 141 11/30/2021 02:22 PM    Potassium 4.4 11/30/2021 02:22 PM    Chloride 103 11/30/2021 02:22 PM    CO2 22 11/30/2021 02:22 PM    Glucose 84 11/30/2021 02:22 PM    BUN 13 11/30/2021 02:22 PM    Creatinine 0.77 11/30/2021 02:22 PM    GFR est AA 97 11/30/2021 02:22 PM    GFR est non-AA 84 11/30/2021 02:22 PM    Calcium 9.2 11/30/2021 02:22 PM     Lab Results   Component Value Date/Time    Bilirubin, total 0.4 11/30/2021 02:22 PM    ALT (SGPT) 24 11/30/2021 02:22 PM    Alk. phosphatase 99 11/30/2021 02:22 PM    Protein, total 7.4 11/30/2021 02:22 PM    Albumin 4.5 11/30/2021 02:22 PM    Globulin 3.6 04/15/2019 09:53 AM     CEA:  Recent Labs     11/30/21  1422 05/25/21  1453   [id number] 0.6 0.9       Records reviewed and summarized above. Pathology report(s) reviewed above. Radiology report(s) reviewed above. CT 6/19/19:  IMPRESSION:  No evidence for metastatic disease. CT 12/2020    IMPRESSION  IMPRESSION:  No evidence of metastatic disease in the chest, abdomen, or pelvis. Status post  cholecystectomy. Stable postoperative changes rectosigmoid colon. Right ventral  hernia increased in size. Hepatic steatosis    CT 5/25/2021  IMPRESSION  No evidence of metastatic disease in the abdomen or pelvis. Postoperative  changes in the rectosigmoid colon, as well as partial small bowel resection. Slight interval increase in size of the fat-containing right ventral hernia.     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 Repeat due in a year. Will repeat imaging in 6 months and then switch to annual scans till year 5    2) Hypothyroidism  Controlled    3) Neuropathy  Resolved     4) Obesity  Reviewed life style changes  Healthy BMI    Plan:     · Continue surveillance  · Colonoscopy as scheduled  · CBC with diff, CMP, CEA every 6 months  · CT CAP in 12 months    RTC in 6 months with labs     I appreciate the opportunity to participate in Ms. Gilda Galeano's care. Signed By: Alana Kenny MD      The patient was evaluated through a synchronous (real-time) audio-video encounter. The patient (or guardian if applicable) is aware that this is a billable service. Verbal consent to proceed has been obtained within the past 12 months. The visit was conducted pursuant to the emergency declaration under the [facility], [address] [place] [address] waiver authority and the [employer] General Act. Patient identification was verified, and a caregiver was present when appropriate. The patient was located in a state where the provider was credentialed to provide care.

## 2021-12-14 NOTE — PROGRESS NOTES
Susan Case is a 62 y.o. female    No chief complaint on file. 1. Have you been to the ER, urgent care clinic since your last visit? Hospitalized since your last visit? No    2. Have you seen or consulted any other health care providers outside of the [address] [place] [address] since your last visit? Include any pap smears or colon screening.  Yes, GYN- Dr. Eleanor Mcclain ([address] [place] [address])

## 2021-12-21 ENCOUNTER — PATIENT MESSAGE (OUTPATIENT)
Dept: FAMILY MEDICINE CLINIC | Age: 61
End: 2021-12-21

## 2021-12-21 DIAGNOSIS — J30.1 SEASONAL ALLERGIC RHINITIS DUE TO POLLEN: ICD-10-CM

## 2021-12-21 RX ORDER — FLUTICASONE PROPIONATE 50 MCG
2 SPRAY, SUSPENSION (ML) NASAL DAILY
Qty: 1 EACH | Refills: 2 | Status: SHIPPED | OUTPATIENT
Start: 2021-12-21

## 2021-12-21 RX ORDER — MONTELUKAST SODIUM 10 MG/1
10 TABLET ORAL DAILY
Qty: 90 TABLET | Refills: 1 | Status: SHIPPED | OUTPATIENT
Start: 2021-12-21

## 2022-01-18 ENCOUNTER — VIRTUAL VISIT (OUTPATIENT)
Dept: FAMILY MEDICINE CLINIC | Age: 62
End: 2022-01-18
Payer: COMMERCIAL

## 2022-01-18 DIAGNOSIS — J06.9 UPPER RESPIRATORY TRACT INFECTION, UNSPECIFIED TYPE: Primary | ICD-10-CM

## 2022-01-18 PROCEDURE — 99213 OFFICE O/P EST LOW 20 MIN: CPT | Performed by: NURSE PRACTITIONER

## 2022-01-18 RX ORDER — AZITHROMYCIN 250 MG/1
TABLET, FILM COATED ORAL
Qty: 6 TABLET | Refills: 0 | Status: SHIPPED | OUTPATIENT
Start: 2022-01-18 | End: 2022-01-23

## 2022-01-18 RX ORDER — BENZONATATE 200 MG/1
200 CAPSULE ORAL
Qty: 21 CAPSULE | Refills: 0 | Status: SHIPPED | OUTPATIENT
Start: 2022-01-18 | End: 2022-01-25

## 2022-01-18 NOTE — PROGRESS NOTES
Joana Chacko is a 62 y.o. female who was seen by synchronous (real-time) audio-video technology on 1/18/2022 for No chief complaint on file. Assessment & Plan:   Diagnoses and all orders for this visit:    1. Upper respiratory tract infection, unspecified type  -     azithromycin (ZITHROMAX) 250 mg tablet; Take 2 tablets today, then take 1 tablet daily  -     benzonatate (TESSALON) 200 mg capsule; Take 1 Capsule by mouth three (3) times daily as needed for Cough for up to 7 days.  - Ongoing cough for 2 weeks now, likely had COVID  due to  testing positive will cover with azithromycin and advised to take the Tessalon Perles for the cough start Flonase back symptom management as discussed if cough persist follow-up in clinic. Reasons to seek urgent care discussed        I spent at least 6 minutes on this visit with this established patient. Subjective:   VV today for URI. Started on the 3rd. Sore throat was the worse  Coughing that is productive, yellow and green when waking up   At home rapid test was negative for COVID,  tested positive for COVID. Denies CP, SOB, palpitations or leg swelling    Prior to Admission medications    Medication Sig Start Date End Date Taking? Authorizing Provider   montelukast (SINGULAIR) 10 mg tablet Take 1 Tablet by mouth daily. 12/21/21   Vanessa Dubois NP   fluticasone propionate (FLONASE) 50 mcg/actuation nasal spray 2 Sprays by Both Nostrils route daily. 12/21/21   Vanessa Dubois NP   Claritin-D 24 Hour  mg per tablet TAKE 1 TABLET BY MOUTH DAILY 8/26/21   Cailin Richey MD   cholecalciferol (VITAMIN D3) (5000 Units/125 mcg) tab tablet Take 5,000 Units by mouth daily. Provider, Historical   triamcinolone acetonide (KENALOG) 0.1 % topical cream JESSICA EXT AA BID . MIX SMALL AMOUNT IN HAND WITH NYSTATIN 12/5/19   Provider, Historical   nystatin (MYCOSTATIN) topical cream JESSICA EXT AA BID . MIX SMALL AMOUNT IN HAND WITH TRIAM 12/5/19   Provider, Historical   ondansetron (ZOFRAN ODT) 4 mg disintegrating tablet Take 1 Tab by mouth every six (6) hours as needed for Nausea. 12/10/19   Kwesi Johnson MD   terconazole (TERAZOL 7) 0.4 % vaginal cream INSERT 1 APPLICATORFUL IN VAGINA EVERY EVENING FOR 7 DAYS IF NEEDED FOR YEAST INFECTION 8/30/18   Provider, Historical   methIMAzole (TAPAZOLE) 5 mg tablet Take 2.5 mg by mouth nightly. Provider, Historical     Patient Active Problem List   Diagnosis Code    Mixed hyperlipidemia E78.2    Rectal cancer ([facility] [address] C20    Class 1 obesity with serious comorbidity and body mass index (BMI) of 33.0 to 33.9 in adult E66.9, Z68.33    Hyperthyroidism E05.90    Obesity (BMI 30.0-34. 9) E66.9    Incisional hernia of anterior abdominal wall without obstruction or gangrene K43.2    Malignant neoplasm of colon (HCC) C18.9       Review of Systems   Constitutional: Negative for chills, fever and malaise/fatigue. HENT: Positive for congestion and sinus pain. Negative for ear pain. Eyes: Negative for blurred vision. Respiratory: Positive for cough and sputum production. Negative for shortness of breath. Cardiovascular: Negative for chest pain, palpitations and leg swelling. Neurological: Negative for dizziness and headaches.        Objective:     Patient-Reported Vitals 4/13/2021   Patient-Reported Weight 112   Patient-Reported Height 5'7\"        [INSTRUCTIONS:  \"[x]\" Indicates a positive item  \"[]\" Indicates a negative item  -- DELETE ALL ITEMS NOT EXAMINED]    Constitutional: [x] Appears well-developed and well-nourished [x] No apparent distress      [] Abnormal -     Mental status: [x] Alert and awake  [x] Oriented to person/place/time [x] Able to follow commands    [] Abnormal -     Eyes:   EOM    [x]  Normal    [] Abnormal -   Sclera  [x]  Normal    [] Abnormal -          Discharge [x]  None visible   [] Abnormal -     HENT: [x] Normocephalic, atraumatic  [] Abnormal -   [x] Mouth/Throat: Mucous membranes are moist    External Ears [x] Normal  [] Abnormal -    Neck: [x] No visualized mass [] Abnormal -     Pulmonary/Chest: [x] Respiratory effort normal   [x] No visualized signs of difficulty breathing or respiratory distress        [] Abnormal -      Musculoskeletal:   [x] Normal gait with no signs of ataxia         [x] Normal range of motion of neck        [] Abnormal -     Neurological:        [x] No Facial Asymmetry (Cranial nerve 7 motor function) (limited exam due to video visit)          [x] No gaze palsy        [] Abnormal -          Skin:        [x] No significant exanthematous lesions or discoloration noted on facial skin         [] Abnormal -            Psychiatric:       [x] Normal Affect [] Abnormal -        [x] No Hallucinations    Other pertinent observable physical exam findings:-        We discussed the expected course, resolution and complications of the diagnosis(es) in detail. Medication risks, benefits, costs, interactions, and alternatives were discussed as indicated. I advised her to contact the office if her condition worsens, changes or fails to improve as anticipated. She expressed understanding with the diagnosis(es) and plan. Jesusita Stephenson, was evaluated through a synchronous (real-time) audio-video encounter. The patient (or guardian if applicable) is aware that this is a billable service, which includes applicable co-pays. Verbal consent to proceed has been obtained. The visit was conducted pursuant to the emergency declaration under the [address] [place] [address] authority and the [employer] and [employer] General Act. Patient identification was verified, and a caregiver was present when appropriate. The patient was located in a state where the provider was licensed to provide care.       Mandie Montgomery NP

## 2022-01-20 RX ORDER — MONTELUKAST SODIUM 10 MG/1
TABLET ORAL
Qty: 90 TABLET | Refills: 1 | Status: SHIPPED | OUTPATIENT
Start: 2022-01-20

## 2022-01-20 RX ORDER — FLUTICASONE PROPIONATE 50 MCG
SPRAY, SUSPENSION (ML) NASAL
Qty: 16 G | Refills: 0 | Status: SHIPPED | OUTPATIENT
Start: 2022-01-20

## 2022-03-18 PROBLEM — E66.811 CLASS 1 OBESITY WITH SERIOUS COMORBIDITY AND BODY MASS INDEX (BMI) OF 33.0 TO 33.9 IN ADULT: Status: ACTIVE | Noted: 2018-07-17

## 2022-03-18 PROBLEM — E66.9 CLASS 1 OBESITY WITH SERIOUS COMORBIDITY AND BODY MASS INDEX (BMI) OF 33.0 TO 33.9 IN ADULT: Status: ACTIVE | Noted: 2018-07-17

## 2022-03-19 PROBLEM — C18.9 MALIGNANT NEOPLASM OF COLON (HCC): Status: ACTIVE | Noted: 2020-12-16

## 2022-03-19 PROBLEM — K43.2 INCISIONAL HERNIA OF ANTERIOR ABDOMINAL WALL WITHOUT OBSTRUCTION OR GANGRENE: Status: ACTIVE | Noted: 2019-12-09

## 2022-03-19 PROBLEM — E05.90 HYPERTHYROIDISM: Status: ACTIVE | Noted: 2018-12-13

## 2022-03-19 PROBLEM — E66.811 OBESITY (BMI 30.0-34.9): Status: ACTIVE | Noted: 2018-12-13

## 2022-03-19 PROBLEM — E66.9 OBESITY (BMI 30.0-34.9): Status: ACTIVE | Noted: 2018-12-13

## 2022-03-20 PROBLEM — C20 RECTAL CANCER (HCC): Status: ACTIVE | Noted: 2018-07-17

## 2022-06-06 ENCOUNTER — TELEPHONE (OUTPATIENT)
Dept: ONCOLOGY | Age: 62
End: 2022-06-06

## 2022-06-06 NOTE — TELEPHONE ENCOUNTER
[facility] to inform Ms Jo Ann Burns that her lab slip was placed in the mail and Lab Copr hrs at [facility] are M-F 7:00am-5;30pm.

## 2022-06-09 LAB
ALBUMIN SERPL-MCNC: 4.6 G/DL (ref 3.8–4.8)
ALBUMIN/GLOB SERPL: 1.8 {RATIO} (ref 1.2–2.2)
ALP SERPL-CCNC: 110 IU/L (ref 44–121)
ALT SERPL-CCNC: 23 IU/L (ref 0–32)
AST SERPL-CCNC: 18 IU/L (ref 0–40)
BASOPHILS # BLD AUTO: 0 X10E3/UL (ref 0–0.2)
BASOPHILS NFR BLD AUTO: 0 %
BILIRUB SERPL-MCNC: 0.3 MG/DL (ref 0–1.2)
BUN SERPL-MCNC: 13 MG/DL (ref 8–27)
BUN/CREAT SERPL: 18 (ref 12–28)
CALCIUM SERPL-MCNC: 9.2 MG/DL (ref 8.7–10.3)
CEA SERPL-MCNC: 0.6 NG/ML (ref 0–4.7)
CHLORIDE SERPL-SCNC: 103 MMOL/L (ref 96–106)
CO2 SERPL-SCNC: 23 MMOL/L (ref 20–29)
CREAT SERPL-MCNC: 0.72 MG/DL (ref 0.57–1)
EGFR: 95 ML/MIN/1.73
EOSINOPHIL # BLD AUTO: 0.2 X10E3/UL (ref 0–0.4)
EOSINOPHIL NFR BLD AUTO: 2 %
ERYTHROCYTE [DISTWIDTH] IN BLOOD BY AUTOMATED COUNT: 13.9 % (ref 11.7–15.4)
GLOBULIN SER CALC-MCNC: 2.5 G/DL (ref 1.5–4.5)
GLUCOSE SERPL-MCNC: 99 MG/DL (ref 65–99)
HCT VFR BLD AUTO: 40.4 % (ref 34–46.6)
HGB BLD-MCNC: 13.6 G/DL (ref 11.1–15.9)
IMM GRANULOCYTES # BLD AUTO: 0 X10E3/UL (ref 0–0.1)
IMM GRANULOCYTES NFR BLD AUTO: 0 %
LYMPHOCYTES # BLD AUTO: 1.6 X10E3/UL (ref 0.7–3.1)
LYMPHOCYTES NFR BLD AUTO: 23 %
MCH RBC QN AUTO: 28.3 PG (ref 26.6–33)
MCHC RBC AUTO-ENTMCNC: 33.7 G/DL (ref 31.5–35.7)
MCV RBC AUTO: 84 FL (ref 79–97)
MONOCYTES # BLD AUTO: 0.4 X10E3/UL (ref 0.1–0.9)
MONOCYTES NFR BLD AUTO: 6 %
NEUTROPHILS # BLD AUTO: 4.5 X10E3/UL (ref 1.4–7)
NEUTROPHILS NFR BLD AUTO: 69 %
PLATELET # BLD AUTO: 242 X10E3/UL (ref 150–450)
POTASSIUM SERPL-SCNC: 4.9 MMOL/L (ref 3.5–5.2)
PROT SERPL-MCNC: 7.1 G/DL (ref 6–8.5)
RBC # BLD AUTO: 4.81 X10E6/UL (ref 3.77–5.28)
SODIUM SERPL-SCNC: 142 MMOL/L (ref 134–144)
WBC # BLD AUTO: 6.7 X10E3/UL (ref 3.4–10.8)

## 2022-06-14 ENCOUNTER — OFFICE VISIT (OUTPATIENT)
Dept: ONCOLOGY | Age: 62
End: 2022-06-14
Payer: COMMERCIAL

## 2022-06-14 VITALS
BODY MASS INDEX: 33.2 KG/M2 | OXYGEN SATURATION: 96 % | DIASTOLIC BLOOD PRESSURE: 78 MMHG | SYSTOLIC BLOOD PRESSURE: 122 MMHG | HEART RATE: 78 BPM | TEMPERATURE: 98.4 F | RESPIRATION RATE: 18 BRPM | WEIGHT: 212 LBS

## 2022-06-14 DIAGNOSIS — C20 RECTAL CANCER (HCC): ICD-10-CM

## 2022-06-14 PROCEDURE — 99213 OFFICE O/P EST LOW 20 MIN: CPT | Performed by: INTERNAL MEDICINE

## 2022-06-14 NOTE — PROGRESS NOTES
[facility] at [address], [address] [place]: [phone]  F: [phone]    Reason for Visit:   Mony Tena is a 65 y.o. female who is seen on 6/14/2022 for follow up of  follow up for Rectal cancer. Treatment History:   · 6/29/18-colonoscopy was notable for a bleeding mass of malignant appearance in the mid rectum and distal rectum at a distance between 4 cm to 11 cm from the anus. Caused partial obstruction. · 7/6/18-CT chest abdomen pelvis showed rectal thickening, some perirectal stranding and several subcentimeter perirectal lymph nodes  · 7/11/18: Rectal ultrasound showed a partially circumferential mass seen in the distal rectum extending through the muscularis propria, 2 small lymph nodes about 6 mm in size adjacent to the mass. · 8/7/18~ 9/20/18-  Xeloda with Radiation  · 12/31/18: Hand-assisted laparoscopic low anterior resection- 0/17 nodes, no residual cancer and negative margins  · 2/6/19: adjuvant FOLFOX x 1 cycle  · 3/1/19: adjuvant CAPOX  · 6/19/19: CT with NIKOLE   · 11/2021: CT NIKOLE    History of Present Illness:   Patient is a 65 y.o. female who had not had a screening colonoscopy developed constipation and subsequently BRBPR x 5-6 months. She wondered if this was secondary to L thyroxine. However symptoms continued to progress and she was referred to GI for further evaluation. Further investigations as above. Completed neoadjuvant chemoradiation and comes after LAR on 12/3/1/18 by Dr. Lazarus Dawes. She received one cycle of adjuvant FOLFOX and chose not to continue due to grade 2 nausea, grade 1 neuropathy, and concerns over QOL with CADD pump. She declined therapy with single agent Xeloda. Been on surveillance. She feels well, no pain, bleeding  Colonoscopy March 2022 was great! She has 1 adopted daughter and supportive    Quit smoking in 1992  Rare ETOH    No FH of cancer.      Past Medical History:   Diagnosis Date    Asthma     SEASONAL ALLERGY INDUCED    GERD (gastroesophageal reflux disease)     Rectal cancer ([facility] [address] 2018    Diagnosed via colonoscopy on 2018. Staged as [id number]. Treated with neoadjuvant chemoradiation using Xeloda and 5040 cGy radiation and with the final radiation dose administered on 2018. Resected on 2018. Treated with adjuvant chemotherapy that was not well tolerated and that was discontinued on 3/22/2019.  Thyroid disease     hyperthyroidism      Past Surgical History:   Procedure Laterality Date    HX COLONOSCOPY  2018    Dr. Paula Wynn.  HX COLONOSCOPY  2019    Normal post-resection anatomy; Dr. Paula Wynn.  HX GI  2005    Laparoscopic cholecystectomy.  HX GI  2018    Hand-assisted laparoscopic low anterior resection, mobilization of the splenic flexure, creation of colonic J-pouch, coloanal anastomosis, and creation of loop ileostomy; Dr. Unruly Wilkerson.  HX HEENT      Sinus surgery.  HX ORTHOPAEDIC Right 1998    Right carpal tunnel release.  HX ORTHOPAEDIC  1982    Right ulnar nerve repositioning.  HX OTHER SURGICAL  2019    Ileostomy closure with resection and anastomosis; Dr. Unruly Wilkerson.     HX UROLOGICAL  2018    Cystoscopy and placement of temporary bilateral ureteral catheters; Saúl Roldan MD.    IR INSERT TUNL CVC W PORT OVER 5 YEARS  2019    IR REMOVE TUNL CVAD W PORT/PUMP  2019      Social History     Tobacco Use    Smoking status: Former Smoker     Packs/day: 0.50     Years: 10.00     Pack years: 5.00     Quit date:      Years since quittin.4    Smokeless tobacco: Never Used   Substance Use Topics    Alcohol use: Yes     Comment: RARE ETOH      Family History   Problem Relation Age of Onset    Heart Disease Mother     Cancer Mother         CERVICAL    Hypertension Mother     Heart Disease Father     Hypertension Father     Heart Disease Brother     Anesth Problems Other Current Outpatient Medications   Medication Sig    montelukast (SINGULAIR) 10 mg tablet TAKE 1 TABLET BY MOUTH DAILY    fluticasone propionate (FLONASE) 50 mcg/actuation nasal spray SHAKE LIQUID AND USE 2 SPRAYS IN EACH NOSTRIL DAILY    montelukast (SINGULAIR) 10 mg tablet Take 1 Tablet by mouth daily.  fluticasone propionate (FLONASE) 50 mcg/actuation nasal spray 2 Sprays by Both Nostrils route daily.  Claritin-D 24 Hour  mg per tablet TAKE 1 TABLET BY MOUTH DAILY    cholecalciferol (VITAMIN D3) (5000 Units/125 mcg) tab tablet Take 5,000 Units by mouth daily.  triamcinolone acetonide (KENALOG) 0.1 % topical cream JESSICA EXT AA BID . MIX SMALL AMOUNT IN HAND WITH NYSTATIN    nystatin (MYCOSTATIN) topical cream JESSICA EXT AA BID . MIX SMALL AMOUNT IN HAND WITH TRIAM    terconazole (TERAZOL 7) 0.4 % vaginal cream INSERT 1 APPLICATORFUL IN VAGINA EVERY EVENING FOR 7 DAYS IF NEEDED FOR YEAST INFECTION    methIMAzole (TAPAZOLE) 5 mg tablet Take 2.5 mg by mouth nightly. No current facility-administered medications for this visit. No Known Allergies     Review of Systems: A complete review of systems was obtained, negative except as described above. Physical Exam:       Vitals reviewed    Constitutional: Appears well-developed and well-nourished in no apparent distress   Mental status: Alert and awake, Oriented to person/place/time, Able to follow commands  Eyes: EOM normal, Sclera normal, No visible ocular discharge  Skin: No significant exanthematous lesions or discoloration noted on facial skin  Psychiatric: Normal affect, normal judgment/insight. No hallucinations     Results:     Lab Results   Component Value Date/Time    WBC 6.7 06/08/2022 02:08 PM    HGB 13.6 06/08/2022 02:08 PM    HCT 40.4 06/08/2022 02:08 PM    PLATELET 069 77/70/0292 02:08 PM    MCV 84 06/08/2022 02:08 PM    ABS.  NEUTROPHILS 4.5 06/08/2022 02:08 PM     Lab Results   Component Value Date/Time    Sodium 142 06/08/2022 02:08 PM    Potassium 4.9 06/08/2022 02:08 PM    Chloride 103 06/08/2022 02:08 PM    CO2 23 06/08/2022 02:08 PM    Glucose 99 06/08/2022 02:08 PM    BUN 13 06/08/2022 02:08 PM    Creatinine 0.72 06/08/2022 02:08 PM    GFR est AA 97 11/30/2021 02:22 PM    GFR est non-AA 84 11/30/2021 02:22 PM    Calcium 9.2 06/08/2022 02:08 PM     Lab Results   Component Value Date/Time    Bilirubin, total 0.3 06/08/2022 02:08 PM    ALT (SGPT) 23 06/08/2022 02:08 PM    Alk. phosphatase 110 06/08/2022 02:08 PM    Protein, total 7.1 06/08/2022 02:08 PM    Albumin 4.6 06/08/2022 02:08 PM    Globulin 3.6 04/15/2019 09:53 AM     CEA:  Recent Labs     06/08/22  1408 11/30/21  1422   [id number] 0.6 0.6       Records reviewed and summarized above. Pathology report(s) reviewed above. Radiology report(s) reviewed above. CT 6/19/19:  IMPRESSION:  No evidence for metastatic disease. CT 12/2020    IMPRESSION  IMPRESSION:  No evidence of metastatic disease in the chest, abdomen, or pelvis. Status post  cholecystectomy. Stable postoperative changes rectosigmoid colon. Right ventral  hernia increased in size. Hepatic steatosis    CT 5/25/2021  IMPRESSION  No evidence of metastatic disease in the abdomen or pelvis. Postoperative  changes in the rectosigmoid colon, as well as partial small bowel resection. Slight interval increase in size of the fat-containing right ventral hernia.     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, [id number]    S/P NA xeloda and RT followed by LAR on 12/31/18  Had a pCR  Adjuvant Chemotherapy as above- she developed a grade 3 hypersensitivity reaction to Oxaliplatin- declined further chemotherapy including single agent Xeloda    On Surveillance and now 3 years out  CT 11/2021 NIKOLE  Labs 6/2022 good  Scans due 11/2022   She will be 5 years out 7/2023      2) Hypothyroidism  Controlled    3) Neuropathy  Resolved     4) Obesity  Reviewed life style changes  Healthy BMI    Plan: · Continue surveillance  · Colonoscopy as scheduled in 2 years   · CBC with diff, CMP, CEA every 6 months  · CT CAP in 6 MONTHS     RTC in 6 months with labs and scans     I appreciate the opportunity to participate in Ms. Cam Galeano's care.     Signed By: Fartun Hallman MD

## 2022-06-14 NOTE — PROGRESS NOTES
Angela Porter is a 65 y.o. female    Chief Complaint   Patient presents with    Follow-up     Rectal cancer       1. Have you been to the ER, urgent care clinic since your last visit? Hospitalized since your last visit? No    2. Have you seen or consulted any other health care providers outside of the [address] [place] [address] since your last visit? Include any pap smears or colon screening.   Yes, Cyst removed Dr. Flip Goldberg 12/2021

## 2022-09-20 RX ORDER — TIOCONAZOLE 6.5 %
OINTMENT WITH PREFILLED APPLICATOR VAGINAL
Qty: 30 TABLET | Refills: 5 | Status: SHIPPED | OUTPATIENT
Start: 2022-09-20

## 2022-09-23 ENCOUNTER — PATIENT MESSAGE (OUTPATIENT)
Dept: FAMILY MEDICINE CLINIC | Age: 62
End: 2022-09-23

## 2022-09-23 DIAGNOSIS — J30.1 SEASONAL ALLERGIC RHINITIS DUE TO POLLEN: Primary | ICD-10-CM

## 2022-09-26 RX ORDER — BENZONATATE 200 MG/1
200 CAPSULE ORAL
Qty: 30 CAPSULE | Refills: 1 | Status: SHIPPED | OUTPATIENT
Start: 2022-09-26 | End: 2022-10-03

## 2022-10-29 NOTE — PROGRESS NOTES
NUTRITION         Pt and  revisited for low fiber ed. Reviewed at bedside with pt. Good understanding of all material with questions answered. No concerns at this time. Pt excited for potential d/c home tomorrow.      Ekaterina Patterson RD muscle weakness

## 2022-11-14 DIAGNOSIS — C20 RECTAL CANCER (HCC): Primary | ICD-10-CM

## 2022-12-19 NOTE — PROGRESS NOTES
Called Radiology to inform Per Olga Kelly please only give pt IV contrast. [facility] ENCOUN[facility]  CARDIOLOGY OFFICE VISIT        PRIMARY CARE PROVIDER  Nadeem Schulz MD  Last seen: 07/26/2022    SUBJECTIVE  CHIEF COMPLAINT / REASON FOR VISIT:       Cardiac Problem List:   Coronary Artery Disease  S/p STEMI with Stent x 1 (03/14/2018)  S/p Stent x 4 (04/11/2018)  S/p CABG x 3 (11/20/2021) - Dr. Marcos  Provoked Pulmonary Embolism   Post op Atrial Fibrillation   Hyperlipidemia  Hypertension    Noncardiac Considerations:  N/A    Pertinent ED Visits/Hospitalizations since last Cardiology office visit:   N/A      HISTORY OF PRESENT ILLNESS  Pleasant 75 year old male seen in 6 month follow up s/p CABG, Valvular heart disease, Paroxysmal Atrial Fibrillation. Patient transferring care to [facility] in [facility].     Patient underwent CABG x3 with Dr. Marcos on 11/20/2021. He was discharged home on 11/24/2021. He presented to the ED on 11/29/2021 with complaints of worsening shortness of breath. CT chest PE was done and demonstrated large bilateral pulmonary emboli with large clot burden and evidence of RV strain. He was thrombocytopenic with platelet count of 64,000. He was initiated on a heparin drip and admitted to the ICU. He had a left lower extremity DVT on venous ultrasound. Echocardiogram was completed and it revealed normal RV size and function with an EF of 53%. Interventional radiology was consulted for possible mechanical thrombectomy however cardiopulmonary status remained stable and this was not pursued. His platelet count continued to decline and noted to be 46,000. His heparin was stopped and he was placed on argatroban infusion. 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 Hematology was consulted and he was placed on high-dose steroids but his platelet count remained low He had a bone marrow biopsy on 12/17/2021. Is also noted to be bradycardic and electrophysiology was consulted. Carvedilol was discontinued and he was placed on low-dose metoprolol tartrate twice  daily. He had intermittent episodes of atrial fibrillation on 12/19/2021.     CAD-he is s/p anterior MI, stent of LAD in March 2018, followed by multiple stents in the RCA 4/2018. Cardiac catheterization September 2021 showed ostial LAD 70% stenosis DFR 0.92, proximal LAD 75% DFR 0.78 stenosis in mid LAD 75% stenosis DFR 0.64. Patent stent noted in the RCA. Distal RCA with 40% stenosis at bifurcation the right PDA has 80% tubular stenosis. No significant stenosis in the left circumflex. He is now status post CABG x3 with Dr. Marcos with GARCIA-LAD, SVG-D1, SVG-PDA on 11/20/2021 No longer on aspirin or Plavix therapy due to HIT and marked thrombocytopenia with platelet count recently less than 40,000. On metoprolol tartrate    Last cardiology visit was on 6/6/2022.  Patient reports fatigue.  Once patient is finished with current supply of Eliquis discontinue and start Aspirin 81 mg daily.     Last saw Nadeem Schulz MD 7/26/2022 for wellness visit.  He manages chronic medical conditions.     Patient saw Dr. Meza in consult for enlarged prostate.  Start Finasteride.      Today patient reports       ALLERGIES  ALLERGIES:   Allergen Reactions   • Heparin (Porcine) Other (See Comments)     Positive for HIT after heparin infusion       MEDICATIONS  Current Outpatient Medications   Medication Sig Dispense Refill   • atorvastatin (LIPITOR) 40 MG tablet Take 1 tablet by mouth nightly. 90 tablet 3   • finasteride (PROSCAR) 5 MG tablet Take 1 tablet by mouth daily. Side effects include but are not limited to impotence (< 5%), decreased libido (< 5%), decreased volume of ejaculate (< 5%), and gynecomastia (< 1%). 90 tablet 3   • tamsulosin (FLOMAX) 0.4 MG Cap Take 1 capsule by mouth in the morning and 1 capsule in the evening. 180 capsule 1   • ASPIRIN 81 PO      • metoPROLOL tartrate (LOPRESSOR) 25 MG tablet Take 1 tablet by mouth every 12 hours. 180 tablet 3   • Coenzyme Q10 100 MG Cap Take 100 mg by mouth daily (before  breakfast).     • acetaminophen (TYLENOL) 325 MG tablet Take 2 tablets by mouth every 4 hours as needed for Pain or Fever.     • Multiple Vitamins-Minerals (MULTIVITAMIN ADULT PO) Take by mouth daily.      • nitroGLYcerin (NITROSTAT) 0.4 MG sublingual tablet Place 1 tablet under the tongue every 5 minutes as needed for Chest pain. 90 tablet 12   • Cholecalciferol (VITAMIN D) 1000 UNIT capsule Take 1,000 Units by mouth daily. 30 capsule 12     No current facility-administered medications for this visit.       MEDICAL HISTORY  Past Medical History:   Diagnosis Date   • Acute deep vein thrombosis (DVT) of left lower extremity (CMS/[facility]) 11/30/2021    secondary to HIT   • Acute saddle pulmonary embolism with acute cor pulmonale (CMS/[facility]) 11/29/2021   • Allergic rhinitis, cause unspecified 6/10/2008   • Aortic insufficiency 03/2018    mild   • Coronary artery disease involving native coronary artery of native heart with angina pectoris (CMS/[facility])    • Coronary artery disease of native artery of native heart with stable angina pectoris (CMS/[facility]) 6/25/2021   • Deep Vein Thrombosis 11/2009    calf   • Dry eye syndrome 1/21/2015   • Essential hypertension 06/05/2018   • Family history of malignant neoplasm of prostate 2/14/2003   • 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   • High cholesterol    • History of bone marrow biopsy 12/17/2021   • Hypertrophy of prostate without urinary obstruction and other lower urinary tract symptoms (LUTS) 5/14/2007   • Measles without mention of complication     Measles   • Mumps without mention of complication     Mumps   • Nonspecific (abnormal) findings on radiological and other examination of lung field 5/15/2008   • Other and unspecified hyperlipidemia 8/30/2006   • Pain in joint, lower leg 12/3/2009   • PAST MEDICAL HISTORY 03/1997    fx of pubic ramus  left   • Posterior vitreous detachment of right eye 6/23/2014   • Pseudophakia 3/25/2016   • S/P CABG x 3 11/20/2021   • S/P IVC  filter 11/30/2021   • STEMI (ST elevation myocardial infarction) (CMS/[facility]) 03/2018    LAD stented.  EF:55%.  then RCA stented April 2018   • TEAR MED MENISC KNEE-CURRENT MME 11/24/09 11/24/2009   • Tubular adenoma of colon 2011   • Unspecified hyperplasia of prostate     BPH, Flomax for years   • Varicella without mention of complication     Chicken Pox        SURGICAL HISTORY  Past Surgical History:   Procedure Laterality Date   • Arthroscopy knee medial or lat  11/24/2009    left knee arthroscopy medial meniscectomy DR ALVAREZ   • Cardiac catheterization/possible ptca/possible stent  03/2018    LAD stented,  RCA Dz   • Cardiac surgery     • Cath place for coronary angiography w md sup - interp graft & rt heart  09/03/2021    + LAD stenosis   • Colonoscopy Left 08/2017    Mazumdar/ Wnml/ Recall 5 yrs   • Colonoscopy control bleeding  12/17/2011    Dr Jones   • Colonoscopy w biopsy  12/07/2011    mazumdar/ tax3/recall 12/1/2016*   • Coronary artery bypass graft      x3   • Echo heart resting  03/2018    EF 41-45%,  akinesis apical wall and 1/2of anterior septal wall.  mild AI.   • Echo heart resting  06/19/2018    EF: 56%, mild AR, mild MR   • Extracapsular cataract removal w insert io lens prosth wo ecp  06/06/2014    IOL right eye SN60WF 20.5 tph   • Extracapsular cataract removal w insert io lens prosth wo ecp Left 05/18/2017    SN60WF 21.0D Dr. JONAS Kingsley   • Laparoscopy,rp ini ing hernia  1995    Hernia,inguinal, laparoscopic   • Ptca with stent  04/2018    RCA: ostium, mid and distal - all STENTED!  Dr. Matthews   • Removal of tonsils,<11 y/o      Tonsillectomy Alone   • Repair ing hernia,5+y/o,reducibl Bilateral    • Repair umbilical heriberto,5+y/o,reduc  03/2002    Hernia, umbilical, >4 yrs of age        SOCIAL HISTORY  Social History     Tobacco Use   • Smoking status: Never   • Smokeless tobacco: Never   Vaping Use   • Vaping Use: never used   Substance Use Topics   • Alcohol use: Yes     Alcohol/week: 0.0 - 3.0  standard drinks     Comment: couple beers a week   • Drug use: No        FAMILY HISTORY  Family History   Problem Relation Age of Onset   • Coronary Artery Disease Mother    • Coronary Artery Disease Father    • Cancer, Prostate Father         lung cancer   • Cancer, Prostate Brother         Review of Systems:  GENERAL: Negative for:   NEURO: Negative for:   PULMONARY: Negative for:   CV: Negative for:   GI: Negative for:   MS: Negative for:      VITALS  There were no vitals taken for this visit.     Physical Exam:  General:   HEENT:     Neck:    Lungs:    Cardiovascular:     Abdomen:     Extremities:     Neuro:    Skin:           Lab Results   Component Value Date    SODIUM 139 07/25/2022    POTASSIUM 4.6 07/25/2022    BUN 28 (H) 07/25/2022    CREATININE 1.11 07/25/2022    WBC 8.0 07/25/2022    HCT 47.5 07/25/2022    HGB 15.9 07/25/2022     07/25/2022    INR 1.1 02/17/2022    PTT 51 (H) 12/18/2021    GLUCOSE 105 (H) 07/25/2022    TSH 1.896 11/12/2021    NTPROB 2,016 (H) 12/18/2021    CHOLESTEROL 110 07/25/2022    HDL 40 07/25/2022    CALCLDL 52 07/25/2022    TRIGLYCERIDE 89 07/25/2022    MG 2.1 12/17/2021    GFRA >90 05/29/2019    GFRNA 86 05/29/2019    AST 22 07/25/2022    GPT 23 07/25/2022    ALKPT 58 07/25/2022    BILIRUBIN 1.1 (H) 07/25/2022       Lab Results   Component Value Date    NTPROB 2,016 (H) 12/18/2021        DIAGNOSTICS:  12/17/2021 12-Lead EKG  Sinus bradycardia with sinus arrhythmia   Left axis deviation   Poor R wave progression   Nonspecific T wave abnormality   Prolonged QT   Abnormal ECG   When compared with ECG of 30-NOV-2021 10:46,   Vent. rate has decreased BY  26 BPM   QRS axis shifted left   Nonspecific T wave abnormality is now present   QT has lengthened   Confirmed by JENNIFER WALDEN MD ([id number]) on 12/17/2021 3:36:07 PM     11/29/2021 Echocardiogram  Normal left ventricular cavity size. Mildly increased left ventricular wall thickness. Normal left ventricular systolic function.  Left  ventricular ejection fraction, 53 %. Regional wall motion abnormalities (see diagram). Normal diastolic function.  Normal right ventricular size and systolic function, RVSP 58.5 mmHg.  Tracetomild tricuspid valve regurgitation.  No significant change since the prior study.Stat Results called out to the ICU.    11/29/2021 CTA Chest  IMPRESSION:   1. There is a large volume of bilateral pulmonary embolus to include a thin  saddle embolus within the main pulmonary artery extending into the main  right and left pulmonary artery and multiple segmental and subsegmental  pulmonary emboli involving all pulmonary segments. Large clot burden is  felt to be present. There is some evidence of right heart strain with some  dilatation to the right ventricle relative to the left ventricle.  2. There is no evidence of acute pulmonary disease.    11/29/2021 Lower Extremity Venous Duplex US  IMPRESSION:  1.  Occlusive thrombosis in the left posterior tibialis and peroneal veins, a duplicated mid right superficial femoral vein and the right greater saphenous vein.  2.  Nonocclusive thrombosis in the left popliteal vein.  Results were communicated to the patient's nurse, Sangita Zarco RN, on  11/30/2021 12:30 AM by Ludwig Reveles MD.     11/20/2021 CABG x 3 - Dr. Marcos  PROCEDURE PERFORMED:  1.)  Coronary artery bypass graft x 3, left internal mammary artery to LAD, saphenous vein graft to PDA and saphenous vein graft to 1st diagonal branch  2.)  Endoscopic vein harvest  3.)  left internal mammary artery harvest    11/12/2021 Carotid US  IMPRESSION:   1. There is no hemodynamically significant right internal carotid stenosis  (<50%),  using SRU criteria.  2. There is no hemodynamically significant left internal carotid stenosis  (<50%), using SRU criteria.    09/13/2021 Cardiac Catheterization - [facility]  SUMMARY:   --  CORONARY CIRCULATION:   --  Left main: Normal.   --  Ostial LAD: There was a discrete 70 % stenosis. DFR  0.92   --  Proximal LAD: There was a tubular 75 % stenosis at D1 origin. DFR 0.78   --  Mid LAD: There was a tubular 75 % stenosis at D2 origin. DFR 0.64 The   lesion was eccentric. Beyond the stenosis a patent stent was noted. --  1st   diagonal: The vessel was medium to large sized. Angiography showed mild   atherosclerosis. --  Circumflex: The vessel was large sized. Angiography showed minor luminal irregularities. --  1st obtuse marginal: The vessel was normal sized. Angiography showed no evidence of disease. --  2nd obtuse marginal: The vessel was large sized. Angiography showed no evidence of disease. --  3rd obtuse marginal: The vessel was large sized. Angiography showed no evidence of disease.   --  Proximal RCA: There was a 0 % stenosis in-stent.   --  Mid RCA: There was a 0 % stenosis in-stent.   --  Distal RCA: There was a 0 % stenosis in-stent. 40% tubular stenosis at   bifurcation.   --  Right PDA: The vessel was medium sized. There was a tubular 80 % stenosis.   --  Right posterolateral segment: The vessel was medium sized. Angiography   showed mild atherosclerosis.     08[facility]05/2021 [facility]  Impression:   1. No electrocardiographic evidence of regadenoson induced ischemia.   2. The results of the perfusion scan will be dictated separately.   3. Pulse ox remained stable 97-98% on room air.   4. Dr. Wilbert Lugo provided direct onsite supervision during the procedure.  NM findings:  1. There is a medium sized, partially reversible perfusion defect involving the apical anterior wall and apical septum and a fixed m moderate perfusion abnormality in the apex,   2. The ECG portion of this examination will be reported separately.   3. This is a intermediate risk positive result.     04[facility]11[facility]2018 [facility]  SUMMARY:   --  CORONARY CIRCULATION:   --  Left main: The vessel was normal sized and mildly calcified. Angiography   showed no evidence of disease. --  Mid LAD: The  vessel was normal sized.   Angiography showed mild atherosclerosis. The stent in the mid LAD was widely patent. --  Circumflex: The vessel was normal sized. Angiography showed minor luminal irregularities. --  Proximal RCA: There was a 85 % stenosis. There was JOSE grade 3 flow through the vessel (brisk flow). --  Mid RCA: There was a 75 % stenosis. There was JOSE grade 3 flow through the vessel (brisk flow). -- Distal RCA: There was a 85 % stenosis. There was JOSE grade 3 flow through the vessel (brisk flow).     03/30/2018 Holter Monitor - [facility]  FINDINGS:   1.  The predominant rhythm was sinus rhythm with a first-degree AV block and rates ranging from  beats per minute.   2.  There were no pauses greater than 2.5 seconds.   3.  There were a total of 8 supraventricular ectopics with one 5-beat run at a rate of 110 beats per minute.   4.  There were 314 ventricular ectopics, including no pairs and no runs.   5.  The patient was asymptomatic.   6.  The longest R-R interval was 1.6 seconds, and the slowest heart rate occurred at 9:48 p.m.     03/14/2018 Cardiac Catheterization - [facility]  SUMMARY:   --  CORONARY CIRCULATION:   --  Left main: The vessel was normal sized. Angiography showed no evidence of disease.   --  Proximal LAD: There was a discrete 40 % stenosis.   --  Mid LAD: There was a 100 % stenosis. There was JOSE grade 0 flow through the vessel (no flow). --  Distal LAD: There was a diffuse 80 % stenosis. The vessel is only a 1.5 mm vessel at this point at best. --  Circumflex: The vessel was medium sized. Angiography showed minor luminal irregularities.   --  Proximal RCA: There was a discrete 60 % stenosis.   --  Distal RCA: There was a tubular 80 % stenosis.     The ASCVD Risk score (Mark DK, et al., 2019) failed to calculate for the following reasons:    The patient has a prior MI or stroke diagnosis       Assessment:  Coronary Artery Disease  • S/p STEMI with Stent x 1 (03/14/2018)  - Mid  LAD (Synergy 2.5 x 28 mm stent)  • S/p Stent x 4 (04/11/2018)  - Distal RCA (Synergy 2.5 x 12 mm stent)  - Proximal to distal RCA (Resolute 3.0 x 16 mm, 3.0 x 12 mm stents and Synergy 3.0 x 8 mm stent)  • S/p CABG x 3 (11/20/2021) - Dr. Marcos  o GARCIA to LAD  o SVG to M1  o SVG to PDA  o Endoscopic vein harvest, and Left internal mammary artery harvest  · Hx HIT and marked thrombocytopenia  • Last Echo (11/29/2021) - EF 53.0%  Provoked Pulmonary Embolism   · S/p IVC filter (11/30/2021)  · Positive for HIT after heparin infusion  Post op Atrial Fibrillation   • AYANA 16.7 ml/m² (Echo 11/29/2021)  • 48hr Holter (03/30/2018) - sinus rhythm with a first-degree AV block  • Last EKG (12/17/2021) shows: sinus rhythm with sinus arrhythmias  · CHADSVASc Stroke Risk Score = 6  · On Eliquis  Hyperlipidemia  • Last Lipid Panel (07/25/2022) - LDL 52  • On Atorvastatin (Lipitor)   Hypertension    Plan:      Follow up in cardiology clinic in  or sooner if symptomatic           12/19/22    We would like to thank Dr. Nadeem Schulz MD for involving us in the care of Nadeem Pickard.

## 2023-01-27 ENCOUNTER — APPOINTMENT (OUTPATIENT)
Dept: CT IMAGING | Age: 63
End: 2023-01-27
Attending: REGISTERED NURSE
Payer: COMMERCIAL

## 2023-01-27 ENCOUNTER — HOSPITAL ENCOUNTER (OUTPATIENT)
Dept: CT IMAGING | Age: 63
End: 2023-01-27
Attending: REGISTERED NURSE
Payer: COMMERCIAL

## 2023-01-27 DIAGNOSIS — C20 RECTAL CANCER (HCC): ICD-10-CM

## 2023-01-27 PROCEDURE — 74177 CT ABD & PELVIS W/CONTRAST: CPT

## 2023-01-27 PROCEDURE — 71260 CT THORAX DX C+: CPT

## 2023-01-27 PROCEDURE — 74011000636 HC RX REV CODE- 636: Performed by: RADIOLOGY

## 2023-01-27 RX ADMIN — IOPAMIDOL 100 ML: 755 INJECTION, SOLUTION INTRAVENOUS at 13:54

## 2023-01-31 ENCOUNTER — OFFICE VISIT (OUTPATIENT)
Dept: ONCOLOGY | Age: 63
End: 2023-01-31
Payer: COMMERCIAL

## 2023-01-31 VITALS
BODY MASS INDEX: 33.99 KG/M2 | DIASTOLIC BLOOD PRESSURE: 84 MMHG | HEART RATE: 90 BPM | TEMPERATURE: 98.5 F | WEIGHT: 217 LBS | SYSTOLIC BLOOD PRESSURE: 131 MMHG | RESPIRATION RATE: 18 BRPM | OXYGEN SATURATION: 95 %

## 2023-01-31 DIAGNOSIS — C20 RECTAL CANCER (HCC): Primary | ICD-10-CM

## 2023-01-31 PROCEDURE — 99214 OFFICE O/P EST MOD 30 MIN: CPT | Performed by: INTERNAL MEDICINE

## 2023-01-31 NOTE — PROGRESS NOTES
[facility] at [address], [address] [place]: [phone]  F: [phone]    Reason for Visit:   Rosibel Basurto is a 65 y.o. female who is seen on 1/31/2023 for follow up of  follow up for Rectal cancer. Treatment History:   6/29/18-colonoscopy was notable for a bleeding mass of malignant appearance in the mid rectum and distal rectum at a distance between 4 cm to 11 cm from the anus. Caused partial obstruction. 7/6/18-CT chest abdomen pelvis showed rectal thickening, some perirectal stranding and several subcentimeter perirectal lymph nodes  7/11/18: Rectal ultrasound showed a partially circumferential mass seen in the distal rectum extending through the muscularis propria, 2 small lymph nodes about 6 mm in size adjacent to the mass. 8/7/18~ 9/20/18-  Xeloda with Radiation  12/31/18: Hand-assisted laparoscopic low anterior resection- 0/17 nodes, no residual cancer and negative margins  2/6/19: adjuvant FOLFOX x 1 cycle  3/1/19: adjuvant CAPOX  6/19/19: CT with NIKOLE   11/2021: CT NIKOLE, CT 1/2023: 8 mm soft tissue nodule abutting the anastomotic stricture    History of Present Illness:   Patient is a 65 y.o. female who had not had a screening colonoscopy developed constipation and subsequently BRBPR x 5-6 months. She wondered if this was secondary to L thyroxine. However symptoms continued to progress and she was referred to GI for further evaluation. Further investigations as above. Completed neoadjuvant chemoradiation and comes after LAR on 12/3/1/18 by Dr. Tracie Beth. She received one cycle of adjuvant FOLFOX and chose not to continue due to grade 2 nausea, grade 1 neuropathy, and concerns over QOL with CADD pump. She declined therapy with single agent Xeloda. Been on surveillance. Colonoscopy March 2022 was great! She has no pain and no bleeding  Feels great . Quit smoking in 1992  Rare ETOH. No FH of cancer.      Past Medical History: Diagnosis Date    Asthma     SEASONAL ALLERGY INDUCED    GERD (gastroesophageal reflux disease)     Rectal cancer ([facility] [address] 2018    Diagnosed via colonoscopy on 2018. Staged as [id number]. Treated with neoadjuvant chemoradiation using Xeloda and 5040 cGy radiation and with the final radiation dose administered on 2018. Resected on 2018. Treated with adjuvant chemotherapy that was not well tolerated and that was discontinued on 3/22/2019. Thyroid disease     hyperthyroidism      Past Surgical History:   Procedure Laterality Date    HX COLONOSCOPY  2018    Dr. Stoney Oconnor. HX COLONOSCOPY  2019    Normal post-resection anatomy; Dr. Stoney Oconnor. HX GI  2005    Laparoscopic cholecystectomy. HX GI  2018    Hand-assisted laparoscopic low anterior resection, mobilization of the splenic flexure, creation of colonic J-pouch, coloanal anastomosis, and creation of loop ileostomy; Dr. Corinne Wright. HX HEENT      Sinus surgery. HX ORTHOPAEDIC Right 1998    Right carpal tunnel release. HX ORTHOPAEDIC  1982    Right ulnar nerve repositioning. HX OTHER SURGICAL  2019    Ileostomy closure with resection and anastomosis; Dr. Corinne Wright.     HX UROLOGICAL  2018    Cystoscopy and placement of temporary bilateral ureteral catheters; Tanmay Sequeira MD.    IR INSERT TUNL CVC W PORT OVER 5 YEARS  2019    IR REMOVE TUNL CVAD W PORT/PUMP  2019      Social History     Tobacco Use    Smoking status: Former     Packs/day: 0.50     Years: 10.00     Pack years: 5.00     Types: Cigarettes     Quit date:      Years since quittin.1    Smokeless tobacco: Never   Substance Use Topics    Alcohol use: Yes     Comment: RARE ETOH      Family History   Problem Relation Age of Onset    Heart Disease Mother     Cancer Mother         CERVICAL    Hypertension Mother     Heart Disease Father     Hypertension Father     Heart Disease Brother     Anesth Problems Other Current Outpatient Medications   Medication Sig    Wal-itin D  mg per tablet TAKE 1 TABLET BY MOUTH DAILY    montelukast (SINGULAIR) 10 mg tablet TAKE 1 TABLET BY MOUTH DAILY    fluticasone propionate (FLONASE) 50 mcg/actuation nasal spray SHAKE LIQUID AND USE 2 SPRAYS IN EACH NOSTRIL DAILY    montelukast (SINGULAIR) 10 mg tablet Take 1 Tablet by mouth daily. fluticasone propionate (FLONASE) 50 mcg/actuation nasal spray 2 Sprays by Both Nostrils route daily. cholecalciferol (VITAMIN D3) (5000 Units/125 mcg) tab tablet Take 5,000 Units by mouth daily. triamcinolone acetonide (KENALOG) 0.1 % topical cream JESSICA EXT AA BID . MIX SMALL AMOUNT IN HAND WITH NYSTATIN    nystatin (MYCOSTATIN) topical cream JESSICA EXT AA BID . MIX SMALL AMOUNT IN HAND WITH TRIAM    terconazole (TERAZOL 7) 0.4 % vaginal cream INSERT 1 APPLICATORFUL IN VAGINA EVERY EVENING FOR 7 DAYS IF NEEDED FOR YEAST INFECTION    methIMAzole (TAPAZOLE) 5 mg tablet Take 2.5 mg by mouth nightly. No current facility-administered medications for this visit. No Known Allergies     Review of Systems: A complete review of systems was obtained, negative except as described above. Physical Exam:       Vitals reviewed    Constitutional: Appears well-developed and well-nourished in no apparent distress   Mental status: Alert and awake, Oriented to person/place/time, Able to follow commands  Eyes: EOM normal, Sclera normal, No visible ocular discharge  Skin: No significant exanthematous lesions or discoloration noted on facial skin  Psychiatric: Normal affect, normal judgment/insight. No hallucinations     Results:     Lab Results   Component Value Date/Time    WBC 6.1 01/27/2023 02:20 PM    HGB 13.4 01/27/2023 02:20 PM    HCT 41.6 01/27/2023 02:20 PM    PLATELET 984 51/72/5781 02:20 PM    MCV 83 01/27/2023 02:20 PM    ABS.  NEUTROPHILS 4.3 01/27/2023 02:20 PM     Lab Results   Component Value Date/Time    Sodium 138 01/27/2023 02:20 PM Potassium 4.4 01/27/2023 02:20 PM    Chloride 100 01/27/2023 02:20 PM    CO2 19 (L) 01/27/2023 02:20 PM    Glucose 87 01/27/2023 02:20 PM    BUN 13 01/27/2023 02:20 PM    Creatinine 0.84 01/27/2023 02:20 PM    GFR est AA 97 11/30/2021 02:22 PM    GFR est non-AA 84 11/30/2021 02:22 PM    Calcium 9.4 01/27/2023 02:20 PM     Lab Results   Component Value Date/Time    Bilirubin, total 0.3 01/27/2023 02:20 PM    ALT (SGPT) 19 01/27/2023 02:20 PM    Alk. phosphatase 106 01/27/2023 02:20 PM    Protein, total 6.9 01/27/2023 02:20 PM    Albumin 4.3 01/27/2023 02:20 PM    Globulin 3.6 04/15/2019 09:53 AM     CEA:  Recent Labs     01/27/23  1420 06/08/22  1408   [id number] <0.6 0.6       Records reviewed and summarized above. Pathology report(s) reviewed above. Radiology report(s) reviewed above. CT 6/19/19:  IMPRESSION:  No evidence for metastatic disease. CT 12/2020    IMPRESSION  IMPRESSION:  No evidence of metastatic disease in the chest, abdomen, or pelvis. Status post  cholecystectomy. Stable postoperative changes rectosigmoid colon. Right ventral  hernia increased in size. Hepatic steatosis    CT 5/25/2021  IMPRESSION  No evidence of metastatic disease in the abdomen or pelvis. Postoperative  changes in the rectosigmoid colon, as well as partial small bowel resection. Slight interval increase in size of the fat-containing right ventral hernia. CT 1/27/2023  IMPRESSION  Status post low anterior resection. 8 mm soft tissue density nodule abutting the anastomotic suture line at the AP  resection is concerning for recurrence, there is a possibility that this  represents a small contrast-filled diverticulum, not definitively demonstrated  on prior studies. . Short-term imaging follow-up is recommended. No additional evidence of recurrence or metastatic disease.   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, ypT0N0    S/P NA xeloda and RT followed by LAR on 12/31/18  Had a pCR  Adjuvant Chemotherapy as above- she developed a grade 3 hypersensitivity reaction to Oxaliplatin- declined further chemotherapy including single agent Xeloda    On Surveillance and now 3 years out  CT 1/2023 reviewed and noted a 8 mm Anastomotic nodule  Will review in Tumor board  PET is unlikely to be informative  Close follow up discussed for now   Labs 1/2023     I would suggest a colonoscopy this March      2) Hypothyroidism  Controlled    3) Neuropathy  Resolved     4) Obesity  Reviewed life style changes  Healthy BMI    Plan:     Continue surveillance  Colonoscopy this year with Dr. Charlesetta Angelucci for CT in 3 months but will review scans in tumor board and call her    I appreciate the opportunity to participate in Ms. Hola aGleano's care.     Signed By: Cristi Bojorquez MD

## 2023-01-31 NOTE — Clinical Note
Need to call nichol office She had scans concerning for recurrence so would request a colonocopy sooner hopefully in Feb

## 2023-01-31 NOTE — PROGRESS NOTES
Arya Aburto is a 65 y.o. female    Chief Complaint   Patient presents with    Follow-up      Rectal cancer       1. Have you been to the ER, urgent care clinic since your last visit? Hospitalized since your last visit? No    2. Have you seen or consulted any other health care providers outside of the [address] [place] [address] since your last visit? Include any pap smears or colon screening.  Yes, Eye exam

## 2023-02-02 ENCOUNTER — DOCUMENTATION ONLY (OUTPATIENT)
Dept: ONCOLOGY | Age: 63
End: 2023-02-02

## 2023-02-02 ENCOUNTER — TELEPHONE (OUTPATIENT)
Dept: ONCOLOGY | Age: 63
End: 2023-02-02

## 2023-02-02 DIAGNOSIS — C20 RECTAL CANCER (HCC): Primary | ICD-10-CM

## 2023-02-02 NOTE — PROGRESS NOTES
Scans reviewed in tumor board  The nodules is scar tissue and has been stable for  several scans  No concern  Please have her come see me in 6 months instead with labs and scans

## 2023-02-02 NOTE — TELEPHONE ENCOUNTER
Donna Kirk 67 pt HIPAA verified x2. Made pt aware that Dr. Dionne Joya reviewed her scans in tumor board  The nodules is scar tissue and has been stable for  several scans  No concern  We will have her rtc in in 6 months instead with labs and scans. Pt is okay to keep previously scheduled colonoscopy instead of having done sooner. Labs have been placed in the mail and our  will contact her with an updated appt date/time. Pt should have scans/labs done 1-2 weeks prior to appt in 6 mths. Pt voiced understanding and has no questions or concerns at this time.

## 2023-05-17 RX ORDER — TRIAMCINOLONE ACETONIDE 1 MG/G
CREAM TOPICAL
COMMUNITY
Start: 2019-12-05

## 2023-05-17 RX ORDER — FLUTICASONE PROPIONATE 50 MCG
SPRAY, SUSPENSION (ML) NASAL
COMMUNITY
Start: 2021-12-21

## 2023-05-17 RX ORDER — METHIMAZOLE 5 MG/1
2.5 TABLET ORAL
COMMUNITY

## 2023-05-17 RX ORDER — LORATADINE 10 MG
1 TABLET ORAL DAILY
COMMUNITY
Start: 2022-09-20

## 2023-05-17 RX ORDER — MONTELUKAST SODIUM 10 MG/1
1 TABLET ORAL DAILY
COMMUNITY
Start: 2021-12-21

## 2023-05-17 RX ORDER — NYSTATIN 100000 U/G
CREAM TOPICAL
COMMUNITY
Start: 2019-12-05

## 2023-07-14 DIAGNOSIS — C20 MALIGNANT NEOPLASM OF RECTUM (HCC): Primary | ICD-10-CM

## 2023-07-14 DIAGNOSIS — C20 MALIGNANT NEOPLASM OF RECTUM (HCC): ICD-10-CM

## 2023-08-21 ENCOUNTER — HOSPITAL ENCOUNTER (OUTPATIENT)
Facility: HOSPITAL | Age: 63
Discharge: HOME | End: 2023-08-24
Payer: COMMERCIAL

## 2023-08-21 DIAGNOSIS — C20 MALIGNANT NEOPLASM OF RECTUM (HCC): ICD-10-CM

## 2023-08-21 PROCEDURE — 6360000004 HC RX CONTRAST MEDICATION: Performed by: RADIOLOGY

## 2023-08-21 PROCEDURE — 71260 CT THORAX DX C+: CPT

## 2023-08-21 RX ADMIN — IOPAMIDOL 100 ML: 755 INJECTION, SOLUTION INTRAVENOUS at 15:59

## 2023-08-22 LAB
ALBUMIN SERPL-MCNC: 4.5 G/DL (ref 3.9–4.9)
ALBUMIN/GLOB SERPL: 1.9 {RATIO} (ref 1.2–2.2)
ALP SERPL-CCNC: 99 IU/L (ref 44–121)
ALT SERPL-CCNC: 24 IU/L (ref 0–32)
AST SERPL-CCNC: 20 IU/L (ref 0–40)
BASOPHILS # BLD AUTO: 0 X10E3/UL (ref 0–0.2)
BASOPHILS NFR BLD AUTO: 0 %
BILIRUB SERPL-MCNC: 0.3 MG/DL (ref 0–1.2)
BUN SERPL-MCNC: 15 MG/DL (ref 8–27)
BUN/CREAT SERPL: 21 (ref 12–28)
CALCIUM SERPL-MCNC: 9.3 MG/DL (ref 8.7–10.3)
CEA SERPL-MCNC: 0.7 NG/ML (ref 0–4.7)
CHLORIDE SERPL-SCNC: 100 MMOL/L (ref 96–106)
CO2 SERPL-SCNC: 22 MMOL/L (ref 20–29)
CREAT SERPL-MCNC: 0.72 MG/DL (ref 0.57–1)
EGFRCR SERPLBLD CKD-EPI 2021: 94 ML/MIN/1.73
EOSINOPHIL # BLD AUTO: 0.1 X10E3/UL (ref 0–0.4)
EOSINOPHIL NFR BLD AUTO: 2 %
ERYTHROCYTE [DISTWIDTH] IN BLOOD BY AUTOMATED COUNT: 13.9 % (ref 11.7–15.4)
GLOBULIN SER CALC-MCNC: 2.4 G/DL (ref 1.5–4.5)
GLUCOSE SERPL-MCNC: 89 MG/DL (ref 70–99)
HCT VFR BLD AUTO: 41.5 % (ref 34–46.6)
HGB BLD-MCNC: 13.3 G/DL (ref 11.1–15.9)
IMM GRANULOCYTES # BLD AUTO: 0 X10E3/UL (ref 0–0.1)
IMM GRANULOCYTES NFR BLD AUTO: 0 %
LYMPHOCYTES # BLD AUTO: 1.5 X10E3/UL (ref 0.7–3.1)
LYMPHOCYTES NFR BLD AUTO: 19 %
MCH RBC QN AUTO: 26.8 PG (ref 26.6–33)
MCHC RBC AUTO-ENTMCNC: 32 G/DL (ref 31.5–35.7)
MCV RBC AUTO: 84 FL (ref 79–97)
MONOCYTES # BLD AUTO: 0.4 X10E3/UL (ref 0.1–0.9)
MONOCYTES NFR BLD AUTO: 6 %
NEUTROPHILS # BLD AUTO: 5.8 X10E3/UL (ref 1.4–7)
NEUTROPHILS NFR BLD AUTO: 73 %
PLATELET # BLD AUTO: 236 X10E3/UL (ref 150–450)
POTASSIUM SERPL-SCNC: 4.4 MMOL/L (ref 3.5–5.2)
PROT SERPL-MCNC: 6.9 G/DL (ref 6–8.5)
RBC # BLD AUTO: 4.96 X10E6/UL (ref 3.77–5.28)
SODIUM SERPL-SCNC: 139 MMOL/L (ref 134–144)
WBC # BLD AUTO: 7.9 X10E3/UL (ref 3.4–10.8)

## 2023-08-25 ENCOUNTER — OFFICE VISIT (OUTPATIENT)
Age: 63
End: 2023-08-25
Payer: COMMERCIAL

## 2023-08-25 VITALS
DIASTOLIC BLOOD PRESSURE: 88 MMHG | WEIGHT: 209.1 LBS | SYSTOLIC BLOOD PRESSURE: 132 MMHG | HEIGHT: 65 IN | OXYGEN SATURATION: 95 % | RESPIRATION RATE: 17 BRPM | BODY MASS INDEX: 34.84 KG/M2 | HEART RATE: 82 BPM

## 2023-08-25 DIAGNOSIS — E66.9 OBESITY (BMI 30.0-34.9): ICD-10-CM

## 2023-08-25 DIAGNOSIS — C18.2 MALIGNANT NEOPLASM OF ASCENDING COLON (HCC): Primary | ICD-10-CM

## 2023-08-25 PROCEDURE — 99213 OFFICE O/P EST LOW 20 MIN: CPT | Performed by: INTERNAL MEDICINE

## 2023-11-01 ENCOUNTER — OFFICE VISIT (OUTPATIENT)
Age: 63
End: 2023-11-01

## 2023-11-01 VITALS
SYSTOLIC BLOOD PRESSURE: 128 MMHG | OXYGEN SATURATION: 95 % | WEIGHT: 210.6 LBS | HEART RATE: 92 BPM | BODY MASS INDEX: 35.09 KG/M2 | HEIGHT: 65 IN | DIASTOLIC BLOOD PRESSURE: 70 MMHG

## 2023-11-01 DIAGNOSIS — I71.21 ANEURYSM OF ASCENDING AORTA WITHOUT RUPTURE (HCC): ICD-10-CM

## 2023-11-01 DIAGNOSIS — R06.02 SOB (SHORTNESS OF BREATH): ICD-10-CM

## 2023-11-01 DIAGNOSIS — I25.10 CORONARY ARTERY CALCIFICATION SEEN ON CAT SCAN: ICD-10-CM

## 2023-11-01 DIAGNOSIS — Z82.49 FAMILY HISTORY OF PREMATURE CAD: Primary | ICD-10-CM

## 2023-11-01 NOTE — PATIENT INSTRUCTIONS
You will be scheduled for an Echocardiogram and an Exercise  Nuclear Stress Test after your appointment today. Nuclear stress testing evaluates blood flow to your heart muscle and assesses cardiac function. There are 2 parts (Rest/Stress) to this procedure and will include either an exercise on a treadmill or an IV administration of a stressing medication called Lexiscan. Your cardiologist will determine which type of testing is best for you. This test can be performed in one day unless it is determined that better quality images will be obtained by performing the test over two days. *Please arrive 15 minutes prior to your appointment time    Test Duration:    -One day testing will take 4 hours    Day of testing instructions:    NO CAFFEINE (not even decaffeinated products) 24 HOURS PRIOR TO TESTING. This includes coffee, soda, tea, chocolate, multivitamins, and migraine medication, like Excedrin or Fioricet that contains caffeine. Nothing to eat or drink 4 HOURS prior to testing  NO NICOTINE 12 hours prior to testing  Hold any medications requested by your cardiologist. Otherwise take medications as directed with a few sips of water. If you are unsure you may bring your medications with you to take after instructed by your stressing nurse. It is recommended that you do not hold any medications prior to your test. DIABETIC PATIENTS: Take half of your insulin with a light meal 4 hours before your test.  Wear comfortable clothes and shoes (Shirts with no metal, shorts or pants, tennis shoes, no heels or flip flops)    IMPORTANT: This testing involves a cardiac tracer ordered specifically for you. If you are unable to make your appointment, please call to cancel/reschedule AT LEAST 24 hours prior to your appointment so your tracer can be cancelled. [phone]. Our office will call you with results.

## 2023-11-01 NOTE — PROGRESS NOTES
LIQUID AND USE 2 SPRAYS IN EACH NOSTRIL DAILY      loratadine-pseudoephedrine (WAL-ITIN D 24 HOUR)  MG per extended release tablet Take 1 tablet by mouth daily      methIMAzole (TAPAZOLE) 5 MG tablet Take 0.5 tablets by mouth 0.5 tablet on Thursday and Sunday      montelukast (SINGULAIR) 10 MG tablet Take 1 tablet by mouth as needed      terconazole (TERAZOL 7) 0.4 % vaginal cream INSERT 1 APPLICATORFUL IN VAGINA EVERY EVENING FOR 7 DAYS IF NEEDED FOR YEAST INFECTION      triamcinolone (KENALOG) 0.1 % cream VASU EXT AA BID . MIX SMALL AMOUNT IN HAND WITH NYSTATIN      nystatin (MYCOSTATIN) 253965 UNIT/GM cream VASU EXT AA BID . MIX SMALL AMOUNT IN HAND WITH TRIAM (Patient not taking: Reported on 11/1/2023)       No current facility-administered medications for this visit. ASSESSMENT & PLAN:      In terms of her shortness of breath and ascending aorta I think she needs an echocardiogram to see if we can follow this without using CT and make sure she does not have any cardiac dysfunction or valvular abnormalities. Because of the shortness of breath, coronary calcifications and family history of premature coronary disease she needs an exercise Cardiolite stress test to sort that out as well. Currently she is not on nor does she require any cardiac medications. Current treatment plan is effective,reviewed diet, exercise and weight control     1. Family history of premature CAD  2. SOB (shortness of breath)  -     Echo (TTE) complete (PRN contrast/bubble/strain/3D); Future  -     Nuclear stress test with myocardial perfusion; Future  3. Coronary artery calcification seen on CAT scan  -     Nuclear stress test with myocardial perfusion; Future  4. Aneurysm of ascending aorta without rupture (HCC)  -     Echo (TTE) complete (PRN contrast/bubble/strain/3D);  Future       Future Appointments   Date Time Provider [address]   11/28/2023  1:00 PM BSBIANCA STRAUSS [address] [facility] AMB   11/28/2023  3:00 PM

## 2023-11-27 ENCOUNTER — TELEPHONE (OUTPATIENT)
Age: 63
End: 2023-11-27

## 2023-11-27 NOTE — TELEPHONE ENCOUNTER
Pt is also scheduled for an echo tomorrow, which Anish Romero said is also pending so both need to be rescheduled.      Future Appointments   Date Time Provider [address]   11/28/2023  1:00 PM SHASHA RAMOS NUCLEAR 1 [address] [place]   11/28/2023  3:00 PM SHASHA RAMOS ECHO 3 [address] [facility] AMB   12/11/2023 10:00 AM Hawa Caputo MD [facility] BS AMB

## 2023-11-27 NOTE — TELEPHONE ENCOUNTER
Good Afternoon,    The Nuc Dr. Coty Grubbs ordered is STILL PENDING with [facility]    Please call the patient to reschedule this appt. Kayla Amaya

## 2023-11-27 NOTE — TELEPHONE ENCOUNTER
Lenny Chandler r/s'ed appts    Future Appointments   Date Time Provider [address]   12/11/2023 10:00 AM Monique Preston MD [facility] BS AMB   1/3/2024  1:00 PM BSBIANCA RAMOS NUCLEAR 1 [facility] AMB   1/3/2024  2:00 PM BSBIANCA RAMOS ECHO 2 [address] [facility] AMB

## 2023-11-28 ENCOUNTER — TELEPHONE (OUTPATIENT)
Age: 63
End: 2023-11-28

## 2023-11-28 NOTE — TELEPHONE ENCOUNTER
Pt called to checked the status of the auth for nuclear and echo, it is still pending, pt would like a call to let her know the tests have been approved.        Pt# [phone]

## 2023-12-04 NOTE — TELEPHONE ENCOUNTER
Double checked with Alok Ocampo and no auth required for either test. Sent pt message in [facility].

## 2023-12-11 ENCOUNTER — OFFICE VISIT (OUTPATIENT)
Facility: CLINIC | Age: 63
End: 2023-12-11
Payer: COMMERCIAL

## 2023-12-11 ENCOUNTER — TELEPHONE (OUTPATIENT)
Facility: CLINIC | Age: 63
End: 2023-12-11

## 2023-12-11 VITALS
HEIGHT: 65 IN | DIASTOLIC BLOOD PRESSURE: 76 MMHG | HEART RATE: 81 BPM | WEIGHT: 217.4 LBS | RESPIRATION RATE: 20 BRPM | SYSTOLIC BLOOD PRESSURE: 131 MMHG | OXYGEN SATURATION: 99 % | BODY MASS INDEX: 36.22 KG/M2 | TEMPERATURE: 97.1 F

## 2023-12-11 DIAGNOSIS — Z23 NEED FOR PROPHYLACTIC VACCINATION WITH TETANUS-DIPHTHERIA (TD): ICD-10-CM

## 2023-12-11 DIAGNOSIS — Z11.4 SCREENING FOR HIV WITHOUT PRESENCE OF RISK FACTORS: ICD-10-CM

## 2023-12-11 DIAGNOSIS — Z23 NEED FOR PROPHYLACTIC VACCINATION AND INOCULATION AGAINST VARICELLA: ICD-10-CM

## 2023-12-11 DIAGNOSIS — Z11.59 NEED FOR HEPATITIS C SCREENING TEST: ICD-10-CM

## 2023-12-11 DIAGNOSIS — Z72.89 OTHER PROBLEMS RELATED TO LIFESTYLE: ICD-10-CM

## 2023-12-11 DIAGNOSIS — Z12.31 ENCOUNTER FOR SCREENING MAMMOGRAM FOR BREAST CANCER: ICD-10-CM

## 2023-12-11 DIAGNOSIS — L90.0 LICHEN SCLEROSUS: ICD-10-CM

## 2023-12-11 DIAGNOSIS — Z00.00 ENCOUNTER FOR WELL ADULT EXAM WITHOUT ABNORMAL FINDINGS: Primary | ICD-10-CM

## 2023-12-11 DIAGNOSIS — E78.00 HYPERCHOLESTEREMIA: ICD-10-CM

## 2023-12-11 DIAGNOSIS — Z71.89 ACP (ADVANCE CARE PLANNING): ICD-10-CM

## 2023-12-11 PROCEDURE — 99396 PREV VISIT EST AGE 40-64: CPT | Performed by: FAMILY MEDICINE

## 2023-12-11 PROCEDURE — 99497 ADVNCD CARE PLAN 30 MIN: CPT | Performed by: FAMILY MEDICINE

## 2023-12-11 RX ORDER — ZOSTER VACCINE RECOMBINANT, ADJUVANTED 50 MCG/0.5
0.5 KIT INTRAMUSCULAR ONCE
Qty: 1 EACH | Refills: 1 | Status: SHIPPED | OUTPATIENT
Start: 2023-12-11 | End: 2023-12-11

## 2023-12-11 RX ORDER — CLOBETASOL PROPIONATE 0.5 MG/G
CREAM TOPICAL
Qty: 15 G | Refills: 1 | Status: SHIPPED | OUTPATIENT
Start: 2023-12-11

## 2023-12-11 SDOH — ECONOMIC STABILITY: FOOD INSECURITY: WITHIN THE PAST 12 MONTHS, YOU WORRIED THAT YOUR FOOD WOULD RUN OUT BEFORE YOU GOT MONEY TO BUY MORE.: NEVER TRUE

## 2023-12-11 SDOH — ECONOMIC STABILITY: INCOME INSECURITY: HOW HARD IS IT FOR YOU TO PAY FOR THE VERY BASICS LIKE FOOD, HOUSING, MEDICAL CARE, AND HEATING?: NOT HARD AT ALL

## 2023-12-11 SDOH — ECONOMIC STABILITY: HOUSING INSECURITY
IN THE LAST 12 MONTHS, WAS THERE A TIME WHEN YOU DID NOT HAVE A STEADY PLACE TO SLEEP OR SLEPT IN A SHELTER (INCLUDING NOW)?: NO

## 2023-12-11 SDOH — ECONOMIC STABILITY: FOOD INSECURITY: WITHIN THE PAST 12 MONTHS, THE FOOD YOU BOUGHT JUST DIDN'T LAST AND YOU DIDN'T HAVE MONEY TO GET MORE.: NEVER TRUE

## 2023-12-11 ASSESSMENT — PATIENT HEALTH QUESTIONNAIRE - PHQ9
SUM OF ALL RESPONSES TO PHQ QUESTIONS 1-9: 0
SUM OF ALL RESPONSES TO PHQ QUESTIONS 1-9: 0
1. LITTLE INTEREST OR PLEASURE IN DOING THINGS: 0
SUM OF ALL RESPONSES TO PHQ9 QUESTIONS 1 & 2: 0
SUM OF ALL RESPONSES TO PHQ QUESTIONS 1-9: 0
2. FEELING DOWN, DEPRESSED OR HOPELESS: 0
SUM OF ALL RESPONSES TO PHQ QUESTIONS 1-9: 0

## 2023-12-11 ASSESSMENT — ENCOUNTER SYMPTOMS
BLOOD IN STOOL: 0
CHEST TIGHTNESS: 0
SHORTNESS OF BREATH: 0

## 2023-12-11 NOTE — PROGRESS NOTES
Well Adult Note  Name: Beryle Crate Date: 2023   MRN: [id number] Sex: Female   Age: 59 y.o. Ethnicity: Non- / Non    : 1960 Race: White (non-)      Myriam Sullivan is here for well adult exam.  History:  Seeing Dr. Stefany Macedo for hyperthyroidism  Still on twice weekly methemizole    Wants refill of clobetasol for lichen sclerosis    Working on weight  Needs to exercise    Eyes and dental up to date  Hearing is OK    Released from oncology as cured from rectal cancer. Had colostomy then reanastamosis. Doing well    Seeing Dr. Ronald Meeks to recheck aortic aneurysm (thoracic)    Review of Systems   Respiratory:  Negative for chest tightness and shortness of breath. Cardiovascular:  Negative for chest pain. Gastrointestinal:  Negative for blood in stool. Genitourinary:  Negative for hematuria. Psychiatric/Behavioral: Negative. No Known Allergies      Prior to Visit Medications    Medication Sig Taking? Authorizing Provider   clobetasol (TEMOVATE) 0.05 % cream Apply topically 2 times daily.  Yes Jonh Norris MD   Tdap (ADACEL) 5-2-15.5 LF-MCG/0.5 injection Inject 0.5 mLs into the muscle once for 1 dose Yes Jonh Norris MD   zoster recombinant adjuvanted vaccine [facility]) 50 MCG/0.5ML SUSR injection Inject 0.5 mLs into the muscle once for 1 dose Yes Jonh Norris MD   vitamin D3 (CHOLECALCIFEROL) 125 MCG (5000 UT) TABS tablet Take 1 tablet by mouth daily  Automatic Reconciliation, Ar   fluticasone (FLONASE) 50 MCG/ACT nasal spray SHAKE LIQUID AND USE 2 SPRAYS IN EACH NOSTRIL DAILY  Automatic Reconciliation, Ar   loratadine-pseudoephedrine (WAL-ITIN D 24 HOUR)  MG per extended release tablet Take 1 tablet by mouth daily  Automatic Reconciliation, Ar   methIMAzole (TAPAZOLE) 5 MG tablet Take 0.5 tablets by mouth 0.5 tablet on Thursday and   Automatic Reconciliation, Ar   montelukast (SINGULAIR) 10 MG tablet Take 1 tablet by mouth as needed

## 2024-01-02 ENCOUNTER — TELEPHONE (OUTPATIENT)
Age: 64
End: 2024-01-02

## 2024-01-02 ENCOUNTER — PATIENT MESSAGE (OUTPATIENT)
Age: 64
End: 2024-01-02

## 2024-01-02 DIAGNOSIS — I25.10 CORONARY ARTERY CALCIFICATION SEEN ON CAT SCAN: ICD-10-CM

## 2024-01-02 DIAGNOSIS — Z82.49 FAMILY HISTORY OF PREMATURE CAD: ICD-10-CM

## 2024-01-02 DIAGNOSIS — I71.21 ANEURYSM OF ASCENDING AORTA WITHOUT RUPTURE (HCC): Primary | ICD-10-CM

## 2024-01-02 NOTE — TELEPHONE ENCOUNTER
----- Message from Lester Parry III, MD sent at 1/2/2024  8:38 AM EST -----  Heart muscle is strong, one mildly leaky valve which is not a problem or worry. Aorta is upper limits of normal in size, nothing to worry about. Looks great

## 2024-03-11 RX ORDER — MONTELUKAST SODIUM 10 MG/1
10 TABLET ORAL DAILY
Qty: 90 TABLET | OUTPATIENT
Start: 2024-03-11

## 2024-03-11 RX ORDER — LORATADINE 10 MG
1 TABLET ORAL DAILY
Qty: 30 TABLET | Refills: 3 | Status: SHIPPED | OUTPATIENT
Start: 2024-03-11

## 2024-03-11 NOTE — TELEPHONE ENCOUNTER
PCP: Isidro Caputo MD    Last appt: [unfilled]  Future Appointments   Date Time Provider Department Center   1/3/2025  9:00 AM [facility] ECHO 1 [facility] AMB   1/3/2025 10:00 AM Lester Parry III, MD [facility] AMB       Requested Prescriptions     Pending Prescriptions Disp Refills    loratadine-pseudoephedrine (WAL-ITIN D 24 HOUR)  MG per extended release tablet 30 tablet 1     Sig: Take 1 tablet by mouth daily       Prior labs and Blood pressures:  BP Readings from Last 3 Encounters:   12/26/23 122/76   12/11/23 131/76   11/01/23 128/70     Lab Results   Component Value Date/Time     08/21/2023 02:20 PM    K 4.4 08/21/2023 02:20 PM     08/21/2023 02:20 PM    CO2 22 08/21/2023 02:20 PM    BUN 15 08/21/2023 02:20 PM    GFRAA 97 11/30/2021 02:22 PM     No results found for: \"HBA1C\", \"SAY6XLPY\"  No results found for: \"CHOL\", \"CHOLPOCT\", \"CHOLX\", \"CHLST\", \"CHOLV\", \"HDL\", \"HDLPOC\", \"HDLC\", \"LDL\", \"LDLC\", \"VLDLC\", \"VLDL\", \"TGLX\", \"TRIGL\"  No results found for: \"VITD3\", \"VD3RIA\"    No results found for: \"TSH\", \"TSH2\", \"TSH3\"

## 2024-03-22 NOTE — TELEPHONE ENCOUNTER
PCP: Isidro Caputo MD    Last appt: [unfilled]  Future Appointments   Date Time Provider Department Center   1/3/2025  9:00 AM [facility] ECHO 1 [facility] AMB   1/3/2025 10:00 AM Lester Parry III, MD [facility] AMB       Requested Prescriptions     Pending Prescriptions Disp Refills    montelukast (SINGULAIR) 10 MG tablet 30 tablet      Sig: Take 1 tablet by mouth as needed       Prior labs and Blood pressures:  BP Readings from Last 3 Encounters:   12/26/23 122/76   12/11/23 131/76   11/01/23 128/70     Lab Results   Component Value Date/Time     08/21/2023 02:20 PM    K 4.4 08/21/2023 02:20 PM     08/21/2023 02:20 PM    CO2 22 08/21/2023 02:20 PM    BUN 15 08/21/2023 02:20 PM    GFRAA 97 11/30/2021 02:22 PM     No results found for: \"HBA1C\", \"GTO6XZVE\"  No results found for: \"CHOL\", \"CHOLPOCT\", \"CHOLX\", \"CHLST\", \"CHOLV\", \"HDL\", \"HDLPOC\", \"HDLC\", \"LDL\", \"LDLC\", \"VLDLC\", \"VLDL\", \"TGLX\", \"TRIGL\"  No results found for: \"VITD3\", \"VD3RIA\"    No results found for: \"TSH\", \"TSH2\", \"TSH3\"

## 2024-03-23 RX ORDER — MONTELUKAST SODIUM 10 MG/1
10 TABLET ORAL DAILY
Qty: 90 TABLET | Refills: 1 | Status: SHIPPED | OUTPATIENT
Start: 2024-03-23

## 2024-03-26 DIAGNOSIS — C18.9 MALIGNANT NEOPLASM OF COLON, UNSPECIFIED PART OF COLON (HCC): Primary | ICD-10-CM

## 2024-04-19 ENCOUNTER — TELEPHONE (OUTPATIENT)
Facility: CLINIC | Age: 64
End: 2024-04-19

## 2024-04-19 NOTE — TELEPHONE ENCOUNTER
Left voicemail for patient to call back to make mammogram appointment   Call Me back at [phone]  My name is Jacy   Or  call Central Scheduling [phone]

## 2024-06-01 LAB
ALBUMIN SERPL-MCNC: 4.5 G/DL (ref 3.9–4.9)
ALP SERPL-CCNC: 108 IU/L (ref 44–121)
ALT SERPL-CCNC: 22 IU/L (ref 0–32)
AST SERPL-CCNC: 31 IU/L (ref 0–40)
BASOPHILS # BLD AUTO: 0 X10E3/UL (ref 0–0.2)
BASOPHILS NFR BLD AUTO: 0 %
BILIRUB SERPL-MCNC: 0.3 MG/DL (ref 0–1.2)
BUN SERPL-MCNC: 14 MG/DL (ref 8–27)
BUN/CREAT SERPL: 16 (ref 12–28)
CALCIUM SERPL-MCNC: 9.4 MG/DL (ref 8.7–10.3)
CEA SERPL-MCNC: 0.7 NG/ML (ref 0–4.7)
CHLORIDE SERPL-SCNC: 104 MMOL/L (ref 96–106)
CHOLEST SERPL-MCNC: 275 MG/DL (ref 100–199)
CO2 SERPL-SCNC: 21 MMOL/L (ref 20–29)
CREAT SERPL-MCNC: 0.86 MG/DL (ref 0.57–1)
EGFRCR SERPLBLD CKD-EPI 2021: 76 ML/MIN/1.73
EOSINOPHIL # BLD AUTO: 0.1 X10E3/UL (ref 0–0.4)
EOSINOPHIL NFR BLD AUTO: 2 %
ERYTHROCYTE [DISTWIDTH] IN BLOOD BY AUTOMATED COUNT: 14.1 % (ref 11.7–15.4)
GLUCOSE SERPL-MCNC: 96 MG/DL (ref 70–99)
HCT VFR BLD AUTO: 44.8 % (ref 34–46.6)
HDLC SERPL-MCNC: 69 MG/DL
HGB BLD-MCNC: 13.8 G/DL (ref 11.1–15.9)
IMM GRANULOCYTES # BLD AUTO: 0 X10E3/UL (ref 0–0.1)
IMM GRANULOCYTES NFR BLD AUTO: 0 %
LDLC SERPL CALC-MCNC: 181 MG/DL (ref 0–99)
LYMPHOCYTES # BLD AUTO: 1.4 X10E3/UL (ref 0.7–3.1)
LYMPHOCYTES NFR BLD AUTO: 23 %
MCH RBC QN AUTO: 26.1 PG (ref 26.6–33)
MCHC RBC AUTO-ENTMCNC: 30.8 G/DL (ref 31.5–35.7)
MCV RBC AUTO: 85 FL (ref 79–97)
MONOCYTES # BLD AUTO: 0.3 X10E3/UL (ref 0.1–0.9)
MONOCYTES NFR BLD AUTO: 6 %
NEUTROPHILS # BLD AUTO: 4.1 X10E3/UL (ref 1.4–7)
NEUTROPHILS NFR BLD AUTO: 69 %
PLATELET # BLD AUTO: 204 X10E3/UL (ref 150–450)
POTASSIUM SERPL-SCNC: 4.2 MMOL/L (ref 3.5–5.2)
PROT SERPL-MCNC: 7.3 G/DL (ref 6–8.5)
RBC # BLD AUTO: 5.29 X10E6/UL (ref 3.77–5.28)
SODIUM SERPL-SCNC: 144 MMOL/L (ref 134–144)
TRIGL SERPL-MCNC: 142 MG/DL (ref 0–149)
VLDLC SERPL CALC-MCNC: 25 MG/DL (ref 5–40)
WBC # BLD AUTO: 6 X10E3/UL (ref 3.4–10.8)

## 2024-06-04 LAB
EST. AVERAGE GLUCOSE BLD GHB EST-MCNC: 114 MG/DL
HBA1C MFR BLD: 5.6 %HB

## 2024-12-11 ENCOUNTER — OFFICE VISIT (OUTPATIENT)
Facility: CLINIC | Age: 64
End: 2024-12-11

## 2024-12-11 VITALS
BODY MASS INDEX: 35.59 KG/M2 | WEIGHT: 213.6 LBS | RESPIRATION RATE: 17 BRPM | HEART RATE: 81 BPM | HEIGHT: 65 IN | TEMPERATURE: 97.8 F | DIASTOLIC BLOOD PRESSURE: 68 MMHG | SYSTOLIC BLOOD PRESSURE: 123 MMHG | OXYGEN SATURATION: 97 %

## 2024-12-11 DIAGNOSIS — C20 RECTAL CANCER (HCC): ICD-10-CM

## 2024-12-11 DIAGNOSIS — Z23 ENCOUNTER FOR IMMUNIZATION: ICD-10-CM

## 2024-12-11 DIAGNOSIS — L90.0 LICHEN SCLEROSUS: ICD-10-CM

## 2024-12-11 DIAGNOSIS — Z00.00 ENCOUNTER FOR WELL ADULT EXAM WITHOUT ABNORMAL FINDINGS: Primary | ICD-10-CM

## 2024-12-11 DIAGNOSIS — E66.812 CLASS 2 SEVERE OBESITY WITH SERIOUS COMORBIDITY AND BODY MASS INDEX (BMI) OF 35.0 TO 35.9 IN ADULT, UNSPECIFIED OBESITY TYPE: ICD-10-CM

## 2024-12-11 DIAGNOSIS — I71.21 ANEURYSM OF ASCENDING AORTA WITHOUT RUPTURE (HCC): ICD-10-CM

## 2024-12-11 DIAGNOSIS — E78.2 MIXED HYPERLIPIDEMIA: ICD-10-CM

## 2024-12-11 DIAGNOSIS — E66.01 CLASS 2 SEVERE OBESITY WITH SERIOUS COMORBIDITY AND BODY MASS INDEX (BMI) OF 35.0 TO 35.9 IN ADULT, UNSPECIFIED OBESITY TYPE: ICD-10-CM

## 2024-12-11 DIAGNOSIS — J30.2 SEASONAL ALLERGIC RHINITIS DUE TO FUNGAL SPORES: ICD-10-CM

## 2024-12-11 DIAGNOSIS — E05.90 HYPERTHYROIDISM: ICD-10-CM

## 2024-12-11 SDOH — ECONOMIC STABILITY: FOOD INSECURITY: WITHIN THE PAST 12 MONTHS, THE FOOD YOU BOUGHT JUST DIDN'T LAST AND YOU DIDN'T HAVE MONEY TO GET MORE.: NEVER TRUE

## 2024-12-11 SDOH — ECONOMIC STABILITY: INCOME INSECURITY: HOW HARD IS IT FOR YOU TO PAY FOR THE VERY BASICS LIKE FOOD, HOUSING, MEDICAL CARE, AND HEATING?: NOT HARD AT ALL

## 2024-12-11 SDOH — ECONOMIC STABILITY: FOOD INSECURITY: WITHIN THE PAST 12 MONTHS, YOU WORRIED THAT YOUR FOOD WOULD RUN OUT BEFORE YOU GOT MONEY TO BUY MORE.: NEVER TRUE

## 2024-12-11 ASSESSMENT — ENCOUNTER SYMPTOMS
BLOOD IN STOOL: 0
SHORTNESS OF BREATH: 0

## 2024-12-11 ASSESSMENT — PATIENT HEALTH QUESTIONNAIRE - PHQ9
SUM OF ALL RESPONSES TO PHQ QUESTIONS 1-9: 0
1. LITTLE INTEREST OR PLEASURE IN DOING THINGS: NOT AT ALL
SUM OF ALL RESPONSES TO PHQ QUESTIONS 1-9: 0
SUM OF ALL RESPONSES TO PHQ9 QUESTIONS 1 & 2: 0
2. FEELING DOWN, DEPRESSED OR HOPELESS: NOT AT ALL

## 2024-12-11 NOTE — PROGRESS NOTES
\"Have you been to the ER, urgent care clinic since your last visit?  Hospitalized since your last visit?\"    NO    “Have you seen or consulted any other health care providers outside our system since your last visit?”    NO    Have you had a mammogram?”   YES - [facility].    Date of last Mammogram: 10/14/2021      “Have you had a pap smear?”    NO Patient has an appointment the end of January    No cervical cancer screening on file             
(HCC) 06/29/2018    Diagnosed via colonoscopy on 6/29/2018.  Staged as [id number].  Treated with neoadjuvant chemoradiation using Xeloda and 5040 cGy radiation and with the final radiation dose administered on 9/20/2018.  Resected on 12/13/2018.  Treated with adjuvant chemotherapy that was not well tolerated and that was discontinued on 3/22/2019.    Thyroid disease 2013    hyperthyroidism     Past Surgical History:   Procedure Laterality Date    COLONOSCOPY  12/05/2019    Normal post-resection anatomy; Dr. White.    COLONOSCOPY  06/29/2018    Dr. White.    GI  12/13/2018    Hand-assisted laparoscopic low anterior resection, mobilization of the splenic flexure, creation of colonic J-pouch, coloanal anastomosis, and creation of loop ileostomy; Dr. Gore.    GI  2005    Laparoscopic cholecystectomy.    HEENT  2015    Sinus surgery.    IR PORT PLACEMENT EQUAL OR GREATER THAN 5 YEARS  1/24/2019    IR PORT PLACEMENT EQUAL OR GREATER THAN 5 YEARS 1/24/2019 [facility]GIO IR    IR PORT PLACEMENT EQUAL OR GREATER THAN 5 YEARS  1/24/2019    IR REMOVE TUNNELED VAD W PORT  4/1/2019    ORTHOPEDIC SURGERY  1982    Right ulnar nerve repositioning.    ORTHOPEDIC SURGERY Right 1998    Right carpal tunnel release.    OTHER SURGICAL HISTORY  04/30/2019    Ileostomy closure with resection and anastomosis; Dr. Gore.    UROLOGICAL SURGERY  12/13/2018    Cystoscopy and placement of temporary bilateral ureteral catheters; Montrell Almaguer MD.     Family History   Problem Relation Age of Onset    Anesth Problems Other     Heart Disease Brother     Heart Attack Brother     Hypertension Mother     Heart Disease Mother     Cancer Mother         CERVICAL    Heart Attack Mother     Hypertension Father     Heart Disease Father     Heart Attack Father      Social History     Tobacco Use    Smoking status: Former     Current packs/day: 0.00     Average packs/day: 0.5 packs/day for 15.0 years (7.5 ttl pk-yrs)     Types: Cigarettes

## 2024-12-11 NOTE — PATIENT INSTRUCTIONS
about your choices and what might be best for you.   Prevent problems where you can. Protect your skin from too much sun, wash your hands, brush your teeth twice a day, and wear a seat belt in the car.   Where can you learn more?  Go to https://www.[employer].net/patientEd and enter P072 to learn more about \"Well Visit, Ages 18 to 65: Care Instructions.\"  Current as of: August 6, 2023  Content Version: 14.2  © 2024 [employer].   Care instructions adapted under license by [employer]. If you have questions about a medical condition or this instruction, always ask your healthcare professional. [employer] disclaims any warranty or liability for your use of this information.

## 2024-12-12 LAB
ALBUMIN SERPL-MCNC: 3.8 G/DL (ref 3.5–5)
ALBUMIN/GLOB SERPL: 1.1 (ref 1.1–2.2)
ALP SERPL-CCNC: 93 U/L (ref 45–117)
ALT SERPL-CCNC: 28 U/L (ref 12–78)
ANION GAP SERPL CALC-SCNC: 5 MMOL/L (ref 2–12)
APPEARANCE UR: CLEAR
AST SERPL-CCNC: 22 U/L (ref 15–37)
BACTERIA URNS QL MICRO: NEGATIVE /HPF
BASOPHILS # BLD: 0 K/UL (ref 0–0.1)
BASOPHILS NFR BLD: 0 % (ref 0–1)
BILIRUB DIRECT SERPL-MCNC: 0.2 MG/DL (ref 0–0.2)
BILIRUB SERPL-MCNC: 0.5 MG/DL (ref 0.2–1)
BILIRUB UR QL: NEGATIVE
BUN SERPL-MCNC: 13 MG/DL (ref 6–20)
BUN/CREAT SERPL: 15 (ref 12–20)
CALCIUM SERPL-MCNC: 9.4 MG/DL (ref 8.5–10.1)
CEA SERPL-MCNC: 0.6 NG/ML
CHLORIDE SERPL-SCNC: 106 MMOL/L (ref 97–108)
CHOLEST SERPL-MCNC: 248 MG/DL
CO2 SERPL-SCNC: 28 MMOL/L (ref 21–32)
COLOR UR: ABNORMAL
CREAT SERPL-MCNC: 0.87 MG/DL (ref 0.55–1.02)
DIFFERENTIAL METHOD BLD: NORMAL
EOSINOPHIL # BLD: 0.2 K/UL (ref 0–0.4)
EOSINOPHIL NFR BLD: 3 % (ref 0–7)
EPITH CASTS URNS QL MICRO: ABNORMAL /LPF
ERYTHROCYTE [DISTWIDTH] IN BLOOD BY AUTOMATED COUNT: 14.1 % (ref 11.5–14.5)
EST. AVERAGE GLUCOSE BLD GHB EST-MCNC: 114 MG/DL
GLOBULIN SER CALC-MCNC: 3.5 G/DL (ref 2–4)
GLUCOSE SERPL-MCNC: 92 MG/DL (ref 65–100)
GLUCOSE UR STRIP.AUTO-MCNC: NEGATIVE MG/DL
HBA1C MFR BLD: 5.6 % (ref 4–5.6)
HCT VFR BLD AUTO: 43 % (ref 35–47)
HDLC SERPL-MCNC: 57 MG/DL
HDLC SERPL: 4.4 (ref 0–5)
HGB BLD-MCNC: 13.5 G/DL (ref 11.5–16)
HGB UR QL STRIP: NEGATIVE
HYALINE CASTS URNS QL MICRO: ABNORMAL /LPF (ref 0–5)
IMM GRANULOCYTES # BLD AUTO: 0 K/UL (ref 0–0.04)
IMM GRANULOCYTES NFR BLD AUTO: 0 % (ref 0–0.5)
KETONES UR QL STRIP.AUTO: NEGATIVE MG/DL
LDLC SERPL CALC-MCNC: 156.8 MG/DL (ref 0–100)
LEUKOCYTE ESTERASE UR QL STRIP.AUTO: ABNORMAL
LYMPHOCYTES # BLD: 1.4 K/UL (ref 0.8–3.5)
LYMPHOCYTES NFR BLD: 22 % (ref 12–49)
MCH RBC QN AUTO: 26.9 PG (ref 26–34)
MCHC RBC AUTO-ENTMCNC: 31.4 G/DL (ref 30–36.5)
MCV RBC AUTO: 85.7 FL (ref 80–99)
MONOCYTES # BLD: 0.4 K/UL (ref 0–1)
MONOCYTES NFR BLD: 6 % (ref 5–13)
NEUTS SEG # BLD: 4.5 K/UL (ref 1.8–8)
NEUTS SEG NFR BLD: 69 % (ref 32–75)
NITRITE UR QL STRIP.AUTO: NEGATIVE
NRBC # BLD: 0 K/UL (ref 0–0.01)
NRBC BLD-RTO: 0 PER 100 WBC
PH UR STRIP: 6 (ref 5–8)
PLATELET # BLD AUTO: 217 K/UL (ref 150–400)
PMV BLD AUTO: 11.6 FL (ref 8.9–12.9)
POTASSIUM SERPL-SCNC: 4.6 MMOL/L (ref 3.5–5.1)
PROT SERPL-MCNC: 7.3 G/DL (ref 6.4–8.2)
PROT UR STRIP-MCNC: NEGATIVE MG/DL
RBC # BLD AUTO: 5.02 M/UL (ref 3.8–5.2)
RBC #/AREA URNS HPF: ABNORMAL /HPF (ref 0–5)
SODIUM SERPL-SCNC: 139 MMOL/L (ref 136–145)
SP GR UR REFRACTOMETRY: 1.02 (ref 1–1.03)
TRIGL SERPL-MCNC: 171 MG/DL
URINE CULTURE IF INDICATED: ABNORMAL
UROBILINOGEN UR QL STRIP.AUTO: 0.2 EU/DL (ref 0.2–1)
VLDLC SERPL CALC-MCNC: 34.2 MG/DL
WBC # BLD AUTO: 6.6 K/UL (ref 3.6–11)
WBC URNS QL MICRO: ABNORMAL /HPF (ref 0–4)

## 2025-02-05 ENCOUNTER — TELEMEDICINE (OUTPATIENT)
Facility: CLINIC | Age: 65
End: 2025-02-05
Payer: COMMERCIAL

## 2025-02-05 DIAGNOSIS — J01.90 ACUTE BACTERIAL SINUSITIS: Primary | ICD-10-CM

## 2025-02-05 DIAGNOSIS — B96.89 ACUTE BACTERIAL SINUSITIS: Primary | ICD-10-CM

## 2025-02-05 PROCEDURE — 99213 OFFICE O/P EST LOW 20 MIN: CPT | Performed by: FAMILY MEDICINE

## 2025-02-05 RX ORDER — AZITHROMYCIN 250 MG/1
TABLET, FILM COATED ORAL
Qty: 6 TABLET | Refills: 0 | Status: SHIPPED | OUTPATIENT
Start: 2025-02-05 | End: 2025-02-15

## 2025-02-05 ASSESSMENT — PATIENT HEALTH QUESTIONNAIRE - PHQ9
SUM OF ALL RESPONSES TO PHQ QUESTIONS 1-9: 0
SUM OF ALL RESPONSES TO PHQ9 QUESTIONS 1 & 2: 0
1. LITTLE INTEREST OR PLEASURE IN DOING THINGS: NOT AT ALL
SUM OF ALL RESPONSES TO PHQ QUESTIONS 1-9: 0
2. FEELING DOWN, DEPRESSED OR HOPELESS: NOT AT ALL
SUM OF ALL RESPONSES TO PHQ QUESTIONS 1-9: 0
SUM OF ALL RESPONSES TO PHQ QUESTIONS 1-9: 0

## 2025-02-05 ASSESSMENT — ENCOUNTER SYMPTOMS
SINUS PAIN: 1
WHEEZING: 0
SINUS PRESSURE: 1
RHINORRHEA: 1
COUGH: 0

## 2025-02-05 NOTE — PROGRESS NOTES
\"Have you been to the ER, urgent care clinic since your last visit?  Hospitalized since your last visit?\"    NO    “Have you seen or consulted any other health care providers outside our system since your last visit?”    NO     “Have you had a pap smear?”    NO    No cervical cancer screening on file             
250 MG tablet 500mg on day 1 followed by 250mg on days 2 - 5 2/5/25 2/15/25 Yes Isidro Caputo MD     No Known Allergies      Review of Systems   Constitutional:  Negative for chills and fever.   HENT:  Positive for congestion, ear pain, rhinorrhea, sinus pressure and sinus pain.    Respiratory:  Negative for cough and wheezing.    Neurological:  Positive for headaches.   Psychiatric/Behavioral: Negative.           Objective:   Vital Signs: (As obtained by patient/caregiver at home)  There were no vitals taken for this visit.     [INSTRUCTIONS:  \"[x]\" Indicates a positive item  \"[]\" Indicates a negative item  -- DELETE ALL ITEMS NOT EXAMINED]    Constitutional: [x] Appears well-developed and well-nourished [x] No apparent distress      [] Abnormal -     Mental status: [x] Alert and awake  [x] Oriented to person/place/time [x] Able to follow commands    [] Abnormal -     Eyes:   EOM    [x]  Normal    [] Abnormal -   Sclera  [x]  Normal    [] Abnormal -          Discharge [x]  None visible   [] Abnormal -     HENT: [x] Normocephalic, atraumatic  [] Abnormal -   [x] Mouth/Throat: Mucous membranes are moist    External Ears [x] Normal  [] Abnormal -    Neck: [x] No visualized mass [] Abnormal -     Pulmonary/Chest: [x] Respiratory effort normal   [x] No visualized signs of difficulty breathing or respiratory distress        [] Abnormal -      Musculoskeletal:   [x] Normal gait with no signs of ataxia         [x] Normal range of motion of neck        [] Abnormal -     Neurological:        [x] No Facial Asymmetry (Cranial nerve 7 motor function) (limited exam due to video visit)          [x] No gaze palsy        [] Abnormal -          Skin:        [x] No significant exanthematous lesions or discoloration noted on facial skin         [] Abnormal -            Psychiatric:       [x] Normal Affect [] Abnormal -        [x] No Hallucinations    Other pertinent observable physical exam findings:-        We discussed the

## 2025-02-25 ENCOUNTER — TELEMEDICINE (OUTPATIENT)
Facility: CLINIC | Age: 65
End: 2025-02-25
Payer: COMMERCIAL

## 2025-02-25 DIAGNOSIS — J01.90 ACUTE BACTERIAL SINUSITIS: Primary | ICD-10-CM

## 2025-02-25 DIAGNOSIS — B96.89 ACUTE BACTERIAL SINUSITIS: Primary | ICD-10-CM

## 2025-02-25 PROCEDURE — 99213 OFFICE O/P EST LOW 20 MIN: CPT | Performed by: FAMILY MEDICINE

## 2025-02-25 RX ORDER — LEVOFLOXACIN 500 MG/1
500 TABLET, FILM COATED ORAL DAILY
Qty: 14 TABLET | Refills: 0 | Status: SHIPPED | OUTPATIENT
Start: 2025-02-25 | End: 2025-03-11

## 2025-02-25 SDOH — ECONOMIC STABILITY: TRANSPORTATION INSECURITY
IN THE PAST 12 MONTHS, HAS THE LACK OF TRANSPORTATION KEPT YOU FROM MEDICAL APPOINTMENTS OR FROM GETTING MEDICATIONS?: NO

## 2025-02-25 SDOH — ECONOMIC STABILITY: FOOD INSECURITY: WITHIN THE PAST 12 MONTHS, YOU WORRIED THAT YOUR FOOD WOULD RUN OUT BEFORE YOU GOT MONEY TO BUY MORE.: NEVER TRUE

## 2025-02-25 SDOH — ECONOMIC STABILITY: TRANSPORTATION INSECURITY
IN THE PAST 12 MONTHS, HAS LACK OF TRANSPORTATION KEPT YOU FROM MEETINGS, WORK, OR FROM GETTING THINGS NEEDED FOR DAILY LIVING?: NO

## 2025-02-25 SDOH — ECONOMIC STABILITY: FOOD INSECURITY: WITHIN THE PAST 12 MONTHS, THE FOOD YOU BOUGHT JUST DIDN'T LAST AND YOU DIDN'T HAVE MONEY TO GET MORE.: NEVER TRUE

## 2025-02-25 SDOH — ECONOMIC STABILITY: INCOME INSECURITY: IN THE LAST 12 MONTHS, WAS THERE A TIME WHEN YOU WERE NOT ABLE TO PAY THE MORTGAGE OR RENT ON TIME?: NO

## 2025-02-25 ASSESSMENT — ENCOUNTER SYMPTOMS
SINUS PAIN: 1
SINUS PRESSURE: 1

## 2025-02-25 NOTE — PROGRESS NOTES
Gayle Kolb is a 64 y.o. female who was seen by synchronous (real-time) audio-video technology on 2/25/2025.      Consent: Gayle Kolb, who was seen by synchronous (real-time) audio-video technology, and/or her healthcare decision maker, is aware that this patient-initiated, Telehealth encounter on 2/25/2025 is a billable service, with coverage as determined by her insurance carrier. She is aware that she may receive a bill and has provided verbal consent to proceed: Yes      Assessment & Plan:   1. Acute bacterial sinusitis  Only partly better then worse again  Previous sinus surgery so will rx with longer course of antibiotics  If continued sx will consider steroids  -     levoFLOXacin (LEVAQUIN) 500 MG tablet; Take 1 tablet by mouth daily for 14 days, Disp-14 tablet, R-0Normal      Return in about 2 weeks (around 3/11/2025) if symptoms worsen or fail to improve.       Subjective:   Gayle Kolb is a 64 y.o. female who was seen for Follow-up (Sinus infection)    Video visit 2/5/2025  Seen for sinusitis  Hx of sinus surgery  Took zpack   Temporarily better then sx recurred.  Headache and congestion are back    No antibiotic allergies but Augmentin upsets her stomach.    Prior to Admission medications    Medication Sig Start Date End Date Taking? Authorizing Provider   levoFLOXacin (LEVAQUIN) 500 MG tablet Take 1 tablet by mouth daily for 14 days 2/25/25 3/11/25 Yes Isidro Caputo MD   montelukast (SINGULAIR) 10 MG tablet Take 1 tablet by mouth daily 3/23/24  Yes Isidro Caputo MD   loratadine-pseudoephedrine (WAL-ITIN D 24 HOUR)  MG per extended release tablet Take 1 tablet by mouth daily 3/11/24  Yes Isidro Caputo MD   clobetasol (TEMOVATE) 0.05 % cream Apply topically 2 times daily. 12/11/23  Yes Isidro Caputo MD   vitamin D3 (CHOLECALCIFEROL) 125 MCG (5000 UT) TABS tablet Take 1 tablet by mouth daily   Yes Automatic Reconciliation, Ar   fluticasone (FLONASE) 50 MCG/ACT nasal spray

## 2025-04-02 RX ORDER — LORATADINE/PSEUDOEPHEDRINE 10MG-240MG
1 TABLET, EXTENDED RELEASE 24 HR ORAL DAILY
Qty: 30 TABLET | Refills: 3 | Status: SHIPPED | OUTPATIENT
Start: 2025-04-02

## 2025-04-07 DIAGNOSIS — C20 RECTAL CANCER (HCC): ICD-10-CM

## 2025-04-07 DIAGNOSIS — Z23 ENCOUNTER FOR IMMUNIZATION: Primary | ICD-10-CM

## 2025-09-03 LAB — CEA SERPL-MCNC: 0.8 NG/ML (ref 0–4.7)

## (undated) DEVICE — DRAPE,REIN 53X77,STERILE: Brand: MEDLINE

## (undated) DEVICE — WRAP SURG W1.31XL1.34M CARD FOR PT 165-172CM THERMOWRP

## (undated) DEVICE — ACCESS PLATFORM FOR MINIMALLY INVASIVE SURGERY.: Brand: GELPORT® LAPAROSCOPIC  SYSTEM

## (undated) DEVICE — SOLUTION IV 1000ML 0.9% SOD CHL

## (undated) DEVICE — SET ADMIN 16ML TBNG L100IN 2 Y INJ SITE IV PIGGY BK DISP

## (undated) DEVICE — SOLUTION IRRIGATION H2O 0797305] ICU MEDICAL INC]

## (undated) DEVICE — RING RETRCTR SQUARE 14.1X14.1C --

## (undated) DEVICE — BARRIER TISS ABSRB 5X6IN 1/PK -- DIRECT ORDER ONLY NON MEDLINE

## (undated) DEVICE — Device

## (undated) DEVICE — SURGICAL PROCEDURE PACK BASIN MAJ SET CUST NO CAUT

## (undated) DEVICE — TRAY PREP DRY W/ PREM GLV 2 APPL 6 SPNG 2 UNDPD 1 OVERWRAP

## (undated) DEVICE — SYR LR LCK 1ML GRAD NSAF 30ML --

## (undated) DEVICE — BLADELESS OPTICAL TROCAR WITH FIXATION CANNULA: Brand: VERSAPORT

## (undated) DEVICE — INFECTION CONTROL KIT SYS

## (undated) DEVICE — 3000CC GUARDIAN II: Brand: GUARDIAN

## (undated) DEVICE — SUTURE VCRL SZ 2-0 L27IN ABSRB UD L26MM SH 1/2 CIR J417H

## (undated) DEVICE — FABRIC REINFORCED, SURGICAL GOWN, LG: Brand: CONVERTORS

## (undated) DEVICE — SYR 50ML SLIP TIP NSAF LF STRL --

## (undated) DEVICE — Z DISCONTINUED NO SUB IDED SET EXTN W/ 4 W STPCOCK M SPIN LOK 36IN

## (undated) DEVICE — SUTURE PDS II SZ 1 L27IN ABSRB VLT CT-1 L36MM 1/2 CIR Z341H

## (undated) DEVICE — TRAY CATH OD16FR SIL URIN M STATLOK STBL DEV SURSTP

## (undated) DEVICE — POOLE SUCTION INSTRUMENT WITH REMOVABLE SHEATH: Brand: POOLE

## (undated) DEVICE — SLIM BODY SKIN STAPLER: Brand: APPOSE ULC

## (undated) DEVICE — TOWEL SURG W17XL27IN STD BLU COT NONFENESTRATED PREWASHED

## (undated) DEVICE — POWER SHELL: Brand: SIGNIA

## (undated) DEVICE — SUTURE MCRYL SZ 4-0 L27IN ABSRB UD L19MM PS-2 1/2 CIR PRIM Y426H

## (undated) DEVICE — SUTURE PERMAHAND SZ 3-0 L18IN NONABSORBABLE BLK L26MM SH C013D

## (undated) DEVICE — STAPLER INT L75MM CUT LN L73MM STPL LN L77MM BLU B FRM 8

## (undated) DEVICE — ROCKER SWITCH PENCIL BLADE ELECTRODE, HOLSTER: Brand: EDGE

## (undated) DEVICE — STERILE-Z MAYO STAND COVERS CLEAR POLYETHYLENE STERILE UNIVERSAL FIT 20 PER CASE: Brand: STERILE-Z

## (undated) DEVICE — SOLIDIFIER FLUID 3000 CC ABSORB

## (undated) DEVICE — TUBING INSUFLTN 10FT LUER -- CONVERT TO ITEM 368568

## (undated) DEVICE — STAPLER SKIN 35CT WD STRL DISP -- MULTIFIRE PREMIUM

## (undated) DEVICE — GOWN,SIRUS,NONRNF,SETINSLV,2XL,18/CS: Brand: MEDLINE

## (undated) DEVICE — GLOVE SURG SZ 75 L1212IN FNGR THK138MIL BRN LTX FREE

## (undated) DEVICE — TELFA NON-ADHERENT ABSORBENT DRESSING: Brand: TELFA

## (undated) DEVICE — JELLY,LUBE,STERILE,FLIP TOP,TUBE,4-OZ: Brand: MEDLINE

## (undated) DEVICE — BLUNT DISSECTOR: Brand: ENDO PEANUT

## (undated) DEVICE — DBD-PACK,LAPAROTOMY,2 REINFORCED GOWNS: Brand: MEDLINE

## (undated) DEVICE — COLON CLOSING PACK: Brand: MEDLINE INDUSTRIES, INC.

## (undated) DEVICE — SYRINGE MED 20ML STD CLR PLAS LUERLOCK TIP N CTRL DISP

## (undated) DEVICE — CLICKLINE SCISSORS INSERT: Brand: CLICKLINE

## (undated) DEVICE — CATHETER URETH 26FR 30CC BLLN F 3 W SPEC M RND TIP TWO

## (undated) DEVICE — Z DISCONTINUEDSOLUTION PREP 2OZ 10% POVIDONE IOD SCR CAP BTL

## (undated) DEVICE — SOLUTION IRRIG 1000ML H2O STRL BLT

## (undated) DEVICE — CATH IV AUTOGRD BC BLU 22GA 25 -- INSYTE

## (undated) DEVICE — SYR 10ML LUER LOK 1/5ML GRAD --

## (undated) DEVICE — SUTURE PERMAHAND SZ 2-0 L18IN NONABSORBABLE BLK L26MM SH C012D

## (undated) DEVICE — NEEDLE HYPO 22GA L1.5IN BLK S STL HUB POLYPR SHLD REG BVL

## (undated) DEVICE — DRAPE,ROBOTICS,STERILE: Brand: MEDLINE

## (undated) DEVICE — CYSTO/BLADDER IRRIGATION SET, REGULATING CLAMP

## (undated) DEVICE — (D)PREP SKN CHLRAPRP APPL 26ML -- CONVERT TO ITEM 371833

## (undated) DEVICE — CANN NASAL O2 CAPNOGRAPHY AD -- FILTERLINE

## (undated) DEVICE — SEALER TISS L35CM DIA5MM CRV JAW ARTC ADV BPLR ENSEAL G2

## (undated) DEVICE — SKIN TEMPERATURE SENSOR: Brand: DEROYAL

## (undated) DEVICE — SPONGE LAP 18X18IN STRL -- 5/PK

## (undated) DEVICE — Z INACTIVE USE 2527070 DRAPE SURG W40XL44IN UNDERBUTTOCK SMS POLYPR W/ PCH BK DISP

## (undated) DEVICE — KIT COMPLIANCE W ENDOGLDE + 11 NO BRSH ENDOKT

## (undated) DEVICE — HOOK RETRCT L5MM E SHRP SELF RET SYS LONE STAR

## (undated) DEVICE — TRAY CATH 16F URIN MTR LTX -- CONVERT TO ITEM 363111

## (undated) DEVICE — SUT PROL 0 30IN CT1 BLU --

## (undated) DEVICE — SUTURE PERMAHAND SZ 2-0 L30IN NONABSORBABLE BLK SILK W/O A305H

## (undated) DEVICE — STAPLER INT AD L L25MM DIA12MM INTLUMN WHT TI CIR CUT 2 ROW

## (undated) DEVICE — NEONATAL-ADULT SPO2 SENSOR: Brand: NELLCOR

## (undated) DEVICE — CYSTOSCOPY PACK: Brand: CONVERTORS

## (undated) DEVICE — KENDALL SCD EXPRESS SLEEVES, KNEE LENGTH, MEDIUM: Brand: KENDALL SCD

## (undated) DEVICE — BLADELESS OPTICAL TROCAR WITH FIXATION CANNULA: Brand: VERSAONE

## (undated) DEVICE — COLOSTOMY/ILEOSTOMY KIT,FLEXWEAR: Brand: NEW IMAGE

## (undated) DEVICE — DEVON™ KNEE AND BODY STRAP 60" X 3" (1.5 M X 7.6 CM): Brand: DEVON

## (undated) DEVICE — BAG BELONG PT PERS CLEAR HANDL

## (undated) DEVICE — Device: Brand: BALLOON3

## (undated) DEVICE — SUTURE VCRL SZ 0 L27IN ABSRB UD L26MM CT-2 1/2 CIR J270H

## (undated) DEVICE — SKIN MARKER,REGULAR TIP WITH RULER AND LABELS: Brand: DEVON

## (undated) DEVICE — 1200 GUARD II KIT W/5MM TUBE W/O VAC TUBE: Brand: GUARDIAN

## (undated) DEVICE — SUT ETHLN 3-0 18IN PS1 BLK --

## (undated) DEVICE — SUTURE VCRL SZ 1 L27IN ABSRB VLT L36MM CT-1 1/2 CIR J341H

## (undated) DEVICE — Z DISCONTINUED NO SUB IDED CANNULA NSL 65FT W O2 SAMP LN PED SHT TERM END TDL FILTERLN

## (undated) DEVICE — LIGHT HANDLE: Brand: DEVON

## (undated) DEVICE — SUTURE PROL SZ 2-0 L36IN NONABSORBABLE BLU SH L26MM 1/2 CIR 8523H

## (undated) DEVICE — Z CONVERTED USE 2274299 CUFF BLD PRESSURE LNG MED AD 25-35 CM ARM FLEXIPORT DISP

## (undated) DEVICE — Device: Brand: SINGLE USE SOFT BRUSH

## (undated) DEVICE — UNIVERSAL FIXATION CANNULA: Brand: VERSAPORT

## (undated) DEVICE — BAG DRAIN URIN 2000ML LF STRL -- CONVERT TO ITEM 363123

## (undated) DEVICE — APPLICATOR BNDG 1MM ADH PREMIERPRO EXOFIN

## (undated) DEVICE — SUTURE VCRL SZ 2-0 L36IN ABSRB UD L40MM CT 1/2 CIR J957H

## (undated) DEVICE — KIT IV STRT W CHLORAPREP PD 1ML

## (undated) DEVICE — BW-400L DISP SNGL-END CLEANINGBRUSH: Brand: OLYMPUS

## (undated) DEVICE — YANKAUER,POOLE TIP,STERILE,50/CS: Brand: MEDLINE

## (undated) DEVICE — KENDALL RADIOLUCENT FOAM MONITORING ELECTRODE -RECTANGULAR SHAPE: Brand: KENDALL

## (undated) DEVICE — NEEDLE HYPO 18GA L1.5IN PNK S STL HUB POLYPR SHLD REG BVL

## (undated) DEVICE — REM POLYHESIVE ADULT PATIENT RETURN ELECTRODE: Brand: VALLEYLAB

## (undated) DEVICE — CATH URET 5FRX70CM POLYUR -- CONVERT TO ITEM 106403

## (undated) DEVICE — DRAPE FLD WRM W44XL66IN C6L FOR INTRATEMP SYS THERMABASIN

## (undated) DEVICE — SUTURE VCRL SZ 3-0 L27IN ABSRB VLT L26MM SH 1/2 CIR J316H

## (undated) DEVICE — CLIP APPLIER: Brand: ENDO CLIP

## (undated) DEVICE — VISUALIZATION SYSTEM: Brand: CLEARIFY

## (undated) DEVICE — STRAP,POSITIONING,KNEE/BODY,FOAM,4X60": Brand: MEDLINE

## (undated) DEVICE — RELOAD STPL M SZ 2 THK30MM PUR THCK TRI STPL

## (undated) DEVICE — STERILE POLYISOPRENE POWDER-FREE SURGICAL GLOVES WITH EMOLLIENT COATING: Brand: PROTEXIS

## (undated) DEVICE — SUT SLK 2-0SH 30IN BLK --

## (undated) DEVICE — TOTAL TRAY, DB, 100% SILI FOLEY, 16FR 10: Brand: MEDLINE

## (undated) DEVICE — ARTICULATING RELOAD WITH TRI-STAPLE TECHNOLOGY: Brand: ENDO GIA

## (undated) DEVICE — ARTICULATION RELOAD WITH TRI-STAPLE TECHNOLOGY: Brand: ENDO GIA

## (undated) DEVICE — STAPLER INT L30MM STD TISS BLU 7 8 FIRING W 35MM 11 TI STPL

## (undated) DEVICE — FILTER SMK EVAC FLO CLR MEGADYNE

## (undated) DEVICE — SOLUTION IRRIG 3000ML 0.9% SOD CHL FLX CONT 0797208] ICU MEDICAL INC]

## (undated) DEVICE — STERILE-Z STAND AND BACK TABLE COVER 66IN X 95IN: Brand: STERILE-Z

## (undated) DEVICE — SURGICAL PROCEDURE PACK TISS 3X5 IN ABSORBABLE SEPRAFILM

## (undated) DEVICE — GARMENT,MEDLINE,DVT,INT,CALF,MED, GEN2: Brand: MEDLINE

## (undated) DEVICE — UNIVERSAL FIXATION CANNULA: Brand: VERSAONE

## (undated) DEVICE — TIDISHIELD UROLOGY DRAIN BAGS FROSTY VINYL STERILE FITS SIEMENS UROSKOP ACCESS 20 PER CASE: Brand: TIDISHIELD

## (undated) DEVICE — SUTURE VCRL SZ 3-0 L27IN ABSRB UD L26MM SH 1/2 CIR J416H

## (undated) DEVICE — DRAPE,SPINE,UNIVERSAL,ST,7/CS: Brand: MEDLINE

## (undated) DEVICE — MASTISOL ADHESIVE LIQ 2/3ML

## (undated) DEVICE — DRAIN SURG W10MMXL20CM SIL FULL PERF HUBLESS FLAT RADPQ

## (undated) DEVICE — CURVED, SMALL JAW, OPEN SEALER/DIVIDER: Brand: LIGASURE